# Patient Record
Sex: FEMALE | Race: OTHER | HISPANIC OR LATINO | Employment: FULL TIME | URBAN - METROPOLITAN AREA
[De-identification: names, ages, dates, MRNs, and addresses within clinical notes are randomized per-mention and may not be internally consistent; named-entity substitution may affect disease eponyms.]

---

## 2022-04-18 ENCOUNTER — TELEPHONE (OUTPATIENT)
Dept: OBSTETRICS AND GYNECOLOGY | Facility: CLINIC | Age: 67
End: 2022-04-18
Payer: MEDICAID

## 2022-04-18 NOTE — TELEPHONE ENCOUNTER
Called Pt to schedule appt pt states she will have daughter call to scheduled    Romi HUGHES LPN  OB/GYN

## 2022-04-18 NOTE — TELEPHONE ENCOUNTER
----- Message from Ny Torres sent at 4/18/2022 12:47 PM CDT -----  Contact: Pt friend  .Type:  Sooner Appointment Request    Caller is requesting a sooner appointment.  Caller declined first available appointment listed below.  Caller will not accept being placed on the waitlist and is requesting a message be sent to doctor.  Name of Caller: Amanda    When is the first available appointment?   Symptoms: pain in pelvis    Would the patient rather a call back or a response via [facility]?  Callback   Best Call Back Number: [phone] (home)     Additional Information:

## 2022-04-18 NOTE — TELEPHONE ENCOUNTER
----- Message from Annette Peters sent at 4/18/2022  2:04 PM CDT -----  Contact: Amanda for [phone]  Amanda called in for patient who needs  to schedule appt for Ob/gyn please call Amanda back [phone] to make appt for patient with abdominal pain.    Thanks bs

## 2022-05-26 ENCOUNTER — OFFICE VISIT (OUTPATIENT)
Dept: OBSTETRICS AND GYNECOLOGY | Facility: CLINIC | Age: 67
End: 2022-05-26
Payer: MEDICAID

## 2022-05-26 VITALS — WEIGHT: 152.56 LBS | SYSTOLIC BLOOD PRESSURE: 150 MMHG | DIASTOLIC BLOOD PRESSURE: 72 MMHG

## 2022-05-26 DIAGNOSIS — Z12.4 PAP SMEAR FOR CERVICAL CANCER SCREENING: ICD-10-CM

## 2022-05-26 DIAGNOSIS — N95.2 VAGINAL ATROPHY: Primary | ICD-10-CM

## 2022-05-26 PROCEDURE — 88175 CYTOPATH C/V AUTO FLUID REDO: CPT | Performed by: OBSTETRICS & GYNECOLOGY

## 2022-05-26 PROCEDURE — 3078F PR MOST RECENT DIASTOLIC BLOOD PRESSURE < 80 MM HG: ICD-10-PCS | Mod: CPTII,,, | Performed by: OBSTETRICS & GYNECOLOGY

## 2022-05-26 PROCEDURE — 99213 OFFICE O/P EST LOW 20 MIN: CPT | Mod: PBBFAC | Performed by: OBSTETRICS & GYNECOLOGY

## 2022-05-26 PROCEDURE — 87210 SMEAR WET MOUNT SALINE/INK: CPT | Mod: PBBFAC | Performed by: OBSTETRICS & GYNECOLOGY

## 2022-05-26 PROCEDURE — 1160F RVW MEDS BY RX/DR IN RCRD: CPT | Mod: CPTII,,, | Performed by: OBSTETRICS & GYNECOLOGY

## 2022-05-26 PROCEDURE — 1159F PR MEDICATION LIST DOCUMENTED IN MEDICAL RECORD: ICD-10-PCS | Mod: CPTII,,, | Performed by: OBSTETRICS & GYNECOLOGY

## 2022-05-26 PROCEDURE — 4010F ACE/ARB THERAPY RXD/TAKEN: CPT | Mod: CPTII,,, | Performed by: OBSTETRICS & GYNECOLOGY

## 2022-05-26 PROCEDURE — 99999 PR PBB SHADOW E&M-EST. PATIENT-LVL III: CPT | Mod: PBBFAC,,, | Performed by: OBSTETRICS & GYNECOLOGY

## 2022-05-26 PROCEDURE — 1160F PR REVIEW ALL MEDS BY PRESCRIBER/CLIN PHARMACIST DOCUMENTED: ICD-10-PCS | Mod: CPTII,,, | Performed by: OBSTETRICS & GYNECOLOGY

## 2022-05-26 PROCEDURE — 3077F PR MOST RECENT SYSTOLIC BLOOD PRESSURE >= 140 MM HG: ICD-10-PCS | Mod: CPTII,,, | Performed by: OBSTETRICS & GYNECOLOGY

## 2022-05-26 PROCEDURE — 3077F SYST BP >= 140 MM HG: CPT | Mod: CPTII,,, | Performed by: OBSTETRICS & GYNECOLOGY

## 2022-05-26 PROCEDURE — 3078F DIAST BP <80 MM HG: CPT | Mod: CPTII,,, | Performed by: OBSTETRICS & GYNECOLOGY

## 2022-05-26 PROCEDURE — 99204 PR OFFICE/OUTPT VISIT, NEW, LEVL IV, 45-59 MIN: ICD-10-PCS | Mod: S$PBB,,, | Performed by: OBSTETRICS & GYNECOLOGY

## 2022-05-26 PROCEDURE — 1159F MED LIST DOCD IN RCRD: CPT | Mod: CPTII,,, | Performed by: OBSTETRICS & GYNECOLOGY

## 2022-05-26 PROCEDURE — 99999 PR PBB SHADOW E&M-EST. PATIENT-LVL III: ICD-10-PCS | Mod: PBBFAC,,, | Performed by: OBSTETRICS & GYNECOLOGY

## 2022-05-26 PROCEDURE — 87624 HPV HI-RISK TYP POOLED RSLT: CPT | Performed by: OBSTETRICS & GYNECOLOGY

## 2022-05-26 PROCEDURE — 4010F PR ACE/ARB THEARPY RXD/TAKEN: ICD-10-PCS | Mod: CPTII,,, | Performed by: OBSTETRICS & GYNECOLOGY

## 2022-05-26 PROCEDURE — 99204 OFFICE O/P NEW MOD 45 MIN: CPT | Mod: S$PBB,,, | Performed by: OBSTETRICS & GYNECOLOGY

## 2022-05-26 RX ORDER — HYDROXYZINE PAMOATE 50 MG/1
50 CAPSULE ORAL NIGHTLY
COMMUNITY
Start: 2022-05-09 | End: 2023-09-25

## 2022-05-26 RX ORDER — CALCIUM CITRATE/VITAMIN D3 200MG-6.25
1 TABLET ORAL 3 TIMES DAILY
COMMUNITY
Start: 2022-05-09 | End: 2023-09-25 | Stop reason: SDUPTHER

## 2022-05-26 RX ORDER — PEN NEEDLE, DIABETIC 32GX 5/32"
NEEDLE, DISPOSABLE MISCELLANEOUS
COMMUNITY
Start: 2022-05-09 | End: 2023-09-25 | Stop reason: SDUPTHER

## 2022-05-26 RX ORDER — ATORVASTATIN CALCIUM 40 MG/1
TABLET, FILM COATED ORAL
COMMUNITY
End: 2023-09-25 | Stop reason: SDUPTHER

## 2022-05-26 RX ORDER — PEN NEEDLE, DIABETIC 30 GX3/16"
NEEDLE, DISPOSABLE MISCELLANEOUS
COMMUNITY
End: 2023-09-25 | Stop reason: SDUPTHER

## 2022-05-26 RX ORDER — OMEPRAZOLE 40 MG/1
CAPSULE, DELAYED RELEASE ORAL
COMMUNITY
End: 2023-09-25

## 2022-05-26 RX ORDER — INSULIN ASPART 100 [IU]/ML
INJECTION, SUSPENSION SUBCUTANEOUS
COMMUNITY
Start: 2021-06-14 | End: 2023-09-25 | Stop reason: SDUPTHER

## 2022-05-26 RX ORDER — METFORMIN HYDROCHLORIDE 1000 MG/1
1000 TABLET ORAL
COMMUNITY
End: 2023-09-25 | Stop reason: SDUPTHER

## 2022-05-26 RX ORDER — LOSARTAN POTASSIUM 100 MG/1
TABLET ORAL
COMMUNITY
End: 2023-09-25 | Stop reason: SDUPTHER

## 2022-05-26 RX ORDER — ERGOCALCIFEROL 1.25 MG/1
CAPSULE ORAL
COMMUNITY
Start: 2021-06-07

## 2022-05-26 RX ORDER — ESTRADIOL 0.1 MG/G
1 CREAM VAGINAL
Qty: 42.5 G | Refills: 6 | Status: SHIPPED | OUTPATIENT
Start: 2022-05-26 | End: 2023-09-25

## 2022-05-26 RX ORDER — LOSARTAN POTASSIUM 100 %
POWDER (GRAM) MISCELLANEOUS
COMMUNITY
End: 2023-09-25

## 2022-05-26 NOTE — PROGRESS NOTES
Subjective:       Patient ID: Rachelle Philip is a 66 y.o. female.    Chief Complaint:  Abdominal Pain and Vaginal Pain      History of Present Illness  HPI  Abdominal Pain  Patient presents for evaluation of abdominal pain. The pain is described as cramping, and is 4/10 in intensity. Pain is located in the suprapubic area without radiation. Onset was gradual occurring 1 months ago. Symptoms have been unchanged since. Aggravating factors: none. Alleviating factors: none. Associated symptoms: vaginal dryness and burning. The patient denies anorexia, constipation, diarrhea, dysuria, fever, hematuria, nausea and vomiting. Risk factors for pelvic/abdominal pain include none.  Last pap negative.  Pt is not sexually active.  Pt had one prior episode of this issue and was told that she had vaginal dryness.  Pt did not use cream.      GYN & OB History  No LMP recorded. Patient is postmenopausal.   Date of Last Pap: No result found    OB History    Para Term  AB Living   4 4 4     4   SAB IAB Ectopic Multiple Live Births           4      # Outcome Date GA Lbr Tushar/2nd Weight Sex Delivery Anes PTL Lv   4 Term            3 Term            2 Term            1 Term                Review of Systems  Review of Systems   Constitutional: Negative for activity change, appetite change, chills, fatigue, fever and unexpected weight change.   Respiratory: Negative for shortness of breath.    Cardiovascular: Negative for chest pain, palpitations and leg swelling.   Gastrointestinal: Positive for abdominal pain. Negative for bloating, blood in stool, constipation, diarrhea, nausea and vomiting.   Genitourinary: Positive for dysuria, vaginal pain and vaginal dryness. Negative for flank pain, frequency, genital sores, hematuria, hot flashes, pelvic pain, urgency, vaginal bleeding, vaginal discharge, urinary incontinence, postmenopausal bleeding and vaginal odor.   Musculoskeletal: Negative for back pain.   Integumentary:  Negative  for breast mass, nipple discharge, breast skin changes and breast tenderness.   Neurological: Negative for syncope and headaches.   Breast: Negative for asymmetry, lump, mass, mastodynia, nipple discharge, skin changes and tenderness          Objective:    Physical Exam:   Constitutional: She is oriented to person, place, and time. She appears well-developed and well-nourished. No distress.    HENT:   Head: Normocephalic and atraumatic.    Eyes: Pupils are equal, round, and reactive to light. EOM are normal.     Cardiovascular: Normal rate, regular rhythm and normal heart sounds.     Pulmonary/Chest: Effort normal and breath sounds normal.        Abdominal: Soft. Bowel sounds are normal. She exhibits no distension. There is no abdominal tenderness.     Genitourinary:    Vagina, uterus, right adnexa and left adnexa normal.      Pelvic exam was performed with patient supine.   There is no rash, tenderness, lesion or injury on the right labia. There is no rash, tenderness, lesion or injury on the left labia. Cervix is normal. Right adnexum displays no mass, no tenderness and no fullness. Left adnexum displays no mass, no tenderness and no fullness. No erythema,  no vaginal discharge, tenderness or bleeding in the vagina.    No foreign body in the vagina.      No signs of injury in the vagina.   Cervix exhibits no motion tenderness, no discharge and no friability. Uterus is not deviated, not enlarged and not tender.    Genitourinary Comments: UA today Negative  Wet prep: negative for trichomonas, yeast, or clue cells             Musculoskeletal: Normal range of motion and moves all extremeties. No tenderness or edema.       Neurological: She is alert and oriented to person, place, and time.    Skin: Skin is warm and dry.    Psychiatric: She has a normal mood and affect. Her behavior is normal. Thought content normal.          Assessment:        1. Vaginal atrophy    2. Pap smear for cervical cancer screening              Plan:      Vaginal atrophy  -     POCT Wet Prep  -     estradioL (ESTRACE) 0.01 % (0.1 mg/gram) vaginal cream; Place 1 g vaginally twice a week.  Dispense: 42.5 g; Refill: 6  -     POCT URINE DIPSTICK WITHOUT MICROSCOPE  -     No evidence of vaginitis on exam today.  Findings suggestive of vaginal atrophy and remainder of exam unremarkable.  Recommend Replens OTC and Estrace cream.    Pap smear for cervical cancer screening  -     Liquid-Based Pap Smear, Screening  -     HPV High Risk Genotypes, PCR  -     Pt was counseled on cervical/vaginal screening guidelines and recommendations.  If today's pap smear result is negative, next pap smear will be due in 3-5 yrs.  -     Follow up with PCP for routine health maintenance needs.      Follow up in about 3 months (around 8/26/2022).

## 2022-08-30 ENCOUNTER — TELEPHONE (OUTPATIENT)
Dept: OBSTETRICS AND GYNECOLOGY | Facility: CLINIC | Age: 67
End: 2022-08-30
Payer: MEDICAID

## 2022-08-30 NOTE — TELEPHONE ENCOUNTER
Attempted to contact patient w/ assistance of [employer] regarding scheduled appointment with  on Tomorrow 8/31/2022.   will not be in clinic until 10am and appointment will need to be rescheduled. No answer left callback message. Will send [employer] communication as well.

## 2023-04-04 ENCOUNTER — PATIENT MESSAGE (OUTPATIENT)
Dept: RESEARCH | Facility: HOSPITAL | Age: 68
End: 2023-04-04
Payer: MEDICARE

## 2023-09-01 ENCOUNTER — TELEPHONE (OUTPATIENT)
Dept: FAMILY MEDICINE | Facility: CLINIC | Age: 68
End: 2023-09-01
Payer: MEDICARE

## 2023-09-01 NOTE — TELEPHONE ENCOUNTER
----- Message from Parish Damico sent at 9/1/2023 11:01 AM CDT -----  Contact: laron Yancey is calling requesting a sooner appt than 09/25 in the afternoon preferably due to work. She is a NP. Please call back at [phone]                Thanks  CORY

## 2023-09-01 NOTE — TELEPHONE ENCOUNTER
Spoke to pt,. Informed her that Dr. Pires only sees new patients in the mornings. Scheduled pt on 9/25/23 at 9:40 am. Pt verbalized understanding and repeated date and time back to me.

## 2023-09-25 ENCOUNTER — OFFICE VISIT (OUTPATIENT)
Dept: FAMILY MEDICINE | Facility: CLINIC | Age: 68
End: 2023-09-25
Attending: FAMILY MEDICINE
Payer: MEDICARE

## 2023-09-25 ENCOUNTER — LAB VISIT (OUTPATIENT)
Dept: LAB | Facility: HOSPITAL | Age: 68
End: 2023-09-25
Attending: FAMILY MEDICINE
Payer: MEDICARE

## 2023-09-25 VITALS
WEIGHT: 152.69 LBS | SYSTOLIC BLOOD PRESSURE: 135 MMHG | BODY MASS INDEX: 29.98 KG/M2 | OXYGEN SATURATION: 98 % | HEART RATE: 73 BPM | HEIGHT: 60 IN | DIASTOLIC BLOOD PRESSURE: 80 MMHG | TEMPERATURE: 97 F

## 2023-09-25 DIAGNOSIS — E11.69 DM TYPE 2 WITH DIABETIC DYSLIPIDEMIA: ICD-10-CM

## 2023-09-25 DIAGNOSIS — E11.69 DM TYPE 2 WITH DIABETIC DYSLIPIDEMIA: Primary | ICD-10-CM

## 2023-09-25 DIAGNOSIS — E78.5 DM TYPE 2 WITH DIABETIC DYSLIPIDEMIA: Primary | ICD-10-CM

## 2023-09-25 DIAGNOSIS — E78.5 DM TYPE 2 WITH DIABETIC DYSLIPIDEMIA: ICD-10-CM

## 2023-09-25 DIAGNOSIS — Z78.0 ASYMPTOMATIC MENOPAUSAL STATE: ICD-10-CM

## 2023-09-25 DIAGNOSIS — I10 PRIMARY HYPERTENSION: ICD-10-CM

## 2023-09-25 DIAGNOSIS — Z12.39 ENCOUNTER FOR SCREENING FOR MALIGNANT NEOPLASM OF BREAST, UNSPECIFIED SCREENING MODALITY: ICD-10-CM

## 2023-09-25 DIAGNOSIS — Z12.31 ENCOUNTER FOR SCREENING MAMMOGRAM FOR MALIGNANT NEOPLASM OF BREAST: ICD-10-CM

## 2023-09-25 DIAGNOSIS — Z13.820 OSTEOPOROSIS SCREENING: ICD-10-CM

## 2023-09-25 PROBLEM — E11.9 TYPE 2 DIABETES MELLITUS, WITH LONG-TERM CURRENT USE OF INSULIN: Status: ACTIVE | Noted: 2021-05-11

## 2023-09-25 PROBLEM — Z79.4 TYPE 2 DIABETES MELLITUS, WITH LONG-TERM CURRENT USE OF INSULIN: Status: ACTIVE | Noted: 2021-05-11

## 2023-09-25 PROBLEM — E08.42 DIABETIC POLYNEUROPATHY ASSOCIATED WITH DIABETES MELLITUS DUE TO UNDERLYING CONDITION: Status: ACTIVE | Noted: 2017-03-24

## 2023-09-25 PROBLEM — K21.9 GASTROESOPHAGEAL REFLUX DISEASE WITHOUT ESOPHAGITIS: Status: ACTIVE | Noted: 2021-02-17

## 2023-09-25 LAB
ALBUMIN SERPL BCP-MCNC: 4.3 G/DL (ref 3.5–5.2)
ALBUMIN/CREAT UR: 15 UG/MG (ref 0–30)
ALP SERPL-CCNC: 78 U/L (ref 55–135)
ALT SERPL W/O P-5'-P-CCNC: 29 U/L (ref 10–44)
ANION GAP SERPL CALC-SCNC: 7 MMOL/L (ref 8–16)
AST SERPL-CCNC: 22 U/L (ref 10–40)
BASOPHILS # BLD AUTO: 0.1 K/UL (ref 0–0.2)
BASOPHILS NFR BLD: 1.4 % (ref 0–1.9)
BILIRUB SERPL-MCNC: 0.5 MG/DL (ref 0.1–1)
BUN SERPL-MCNC: 16 MG/DL (ref 8–23)
CALCIUM SERPL-MCNC: 9.6 MG/DL (ref 8.7–10.5)
CHLORIDE SERPL-SCNC: 104 MMOL/L (ref 95–110)
CHOLEST SERPL-MCNC: 214 MG/DL (ref 120–199)
CHOLEST/HDLC SERPL: 4 {RATIO} (ref 2–5)
CO2 SERPL-SCNC: 28 MMOL/L (ref 23–29)
CREAT SERPL-MCNC: 0.8 MG/DL (ref 0.5–1.4)
CREAT UR-MCNC: 40 MG/DL (ref 15–325)
DIFFERENTIAL METHOD: ABNORMAL
EOSINOPHIL # BLD AUTO: 0.2 K/UL (ref 0–0.5)
EOSINOPHIL NFR BLD: 2.4 % (ref 0–8)
ERYTHROCYTE [DISTWIDTH] IN BLOOD BY AUTOMATED COUNT: 12.3 % (ref 11.5–14.5)
EST. GFR  (NO RACE VARIABLE): >60 ML/MIN/1.73 M^2
ESTIMATED AVG GLUCOSE: 151 MG/DL (ref 68–131)
GLUCOSE SERPL-MCNC: 139 MG/DL (ref 70–110)
HBA1C MFR BLD: 6.9 % (ref 4–5.6)
HCT VFR BLD AUTO: 42.5 % (ref 37–48.5)
HDLC SERPL-MCNC: 53 MG/DL (ref 40–75)
HDLC SERPL: 24.8 % (ref 20–50)
HGB BLD-MCNC: 13.9 G/DL (ref 12–16)
IMM GRANULOCYTES # BLD AUTO: 0.03 K/UL (ref 0–0.04)
IMM GRANULOCYTES NFR BLD AUTO: 0.4 % (ref 0–0.5)
LDLC SERPL CALC-MCNC: 124.8 MG/DL (ref 63–159)
LYMPHOCYTES # BLD AUTO: 2.7 K/UL (ref 1–4.8)
LYMPHOCYTES NFR BLD: 37.2 % (ref 18–48)
MCH RBC QN AUTO: 29.4 PG (ref 27–31)
MCHC RBC AUTO-ENTMCNC: 32.7 G/DL (ref 32–36)
MCV RBC AUTO: 90 FL (ref 82–98)
MICROALBUMIN UR DL<=1MG/L-MCNC: 6 UG/ML
MONOCYTES # BLD AUTO: 0.6 K/UL (ref 0.3–1)
MONOCYTES NFR BLD: 8.2 % (ref 4–15)
NEUTROPHILS # BLD AUTO: 3.6 K/UL (ref 1.8–7.7)
NEUTROPHILS NFR BLD: 50.4 % (ref 38–73)
NONHDLC SERPL-MCNC: 161 MG/DL
NRBC BLD-RTO: 0 /100 WBC
PLATELET # BLD AUTO: 451 K/UL (ref 150–450)
PMV BLD AUTO: 10 FL (ref 9.2–12.9)
POTASSIUM SERPL-SCNC: 4.6 MMOL/L (ref 3.5–5.1)
PROT SERPL-MCNC: 7.8 G/DL (ref 6–8.4)
RBC # BLD AUTO: 4.73 M/UL (ref 4–5.4)
SODIUM SERPL-SCNC: 139 MMOL/L (ref 136–145)
TRIGL SERPL-MCNC: 181 MG/DL (ref 30–150)
WBC # BLD AUTO: 7.2 K/UL (ref 3.9–12.7)

## 2023-09-25 PROCEDURE — 80053 COMPREHEN METABOLIC PANEL: CPT | Performed by: FAMILY MEDICINE

## 2023-09-25 PROCEDURE — 83036 HEMOGLOBIN GLYCOSYLATED A1C: CPT | Performed by: FAMILY MEDICINE

## 2023-09-25 PROCEDURE — 99204 OFFICE O/P NEW MOD 45 MIN: CPT | Mod: S$PBB,,, | Performed by: FAMILY MEDICINE

## 2023-09-25 PROCEDURE — 80061 LIPID PANEL: CPT | Performed by: FAMILY MEDICINE

## 2023-09-25 PROCEDURE — 99999 PR PBB SHADOW E&M-EST. PATIENT-LVL IV: ICD-10-PCS | Mod: PBBFAC,,, | Performed by: FAMILY MEDICINE

## 2023-09-25 PROCEDURE — 99214 OFFICE O/P EST MOD 30 MIN: CPT | Mod: PBBFAC,PO | Performed by: FAMILY MEDICINE

## 2023-09-25 PROCEDURE — 85025 COMPLETE CBC W/AUTO DIFF WBC: CPT | Performed by: FAMILY MEDICINE

## 2023-09-25 PROCEDURE — 36415 COLL VENOUS BLD VENIPUNCTURE: CPT | Mod: PO | Performed by: FAMILY MEDICINE

## 2023-09-25 PROCEDURE — 99204 PR OFFICE/OUTPT VISIT, NEW, LEVL IV, 45-59 MIN: ICD-10-PCS | Mod: S$PBB,,, | Performed by: FAMILY MEDICINE

## 2023-09-25 PROCEDURE — 82570 ASSAY OF URINE CREATININE: CPT | Performed by: FAMILY MEDICINE

## 2023-09-25 PROCEDURE — 99999 PR PBB SHADOW E&M-EST. PATIENT-LVL IV: CPT | Mod: PBBFAC,,, | Performed by: FAMILY MEDICINE

## 2023-09-25 RX ORDER — LOSARTAN POTASSIUM 100 MG/1
TABLET ORAL
Qty: 90 TABLET | Refills: 1 | Status: SHIPPED | OUTPATIENT
Start: 2023-09-25 | End: 2023-12-19 | Stop reason: SDUPTHER

## 2023-09-25 RX ORDER — PEN NEEDLE, DIABETIC 30 GX3/16"
NEEDLE, DISPOSABLE MISCELLANEOUS
Qty: 180 EACH | Refills: 1 | Status: SHIPPED | OUTPATIENT
Start: 2023-09-25 | End: 2023-12-19 | Stop reason: SDUPTHER

## 2023-09-25 RX ORDER — INSULIN ASPART 100 [IU]/ML
INJECTION, SUSPENSION SUBCUTANEOUS
Qty: 6 EACH | Refills: 0 | Status: SHIPPED | OUTPATIENT
Start: 2023-09-25 | End: 2023-10-01 | Stop reason: SDUPTHER

## 2023-09-25 RX ORDER — ATORVASTATIN CALCIUM 40 MG/1
TABLET, FILM COATED ORAL
Qty: 90 TABLET | Refills: 1 | Status: SHIPPED | OUTPATIENT
Start: 2023-09-25 | End: 2023-12-19 | Stop reason: SDUPTHER

## 2023-09-25 RX ORDER — METFORMIN HYDROCHLORIDE 1000 MG/1
1000 TABLET ORAL
Qty: 90 TABLET | Refills: 0 | Status: SHIPPED | OUTPATIENT
Start: 2023-09-25

## 2023-09-25 RX ORDER — ASPIRIN 81 MG/1
81 TABLET ORAL DAILY
COMMUNITY
Start: 2023-08-16

## 2023-09-25 RX ORDER — CALCIUM CITRATE/VITAMIN D3 200MG-6.25
TABLET ORAL
Qty: 270 STRIP | Refills: 1 | Status: SHIPPED | OUTPATIENT
Start: 2023-09-25 | End: 2023-09-28 | Stop reason: SDUPTHER

## 2023-09-25 NOTE — PROGRESS NOTES
Subjective:       Patient ID: Rachelle Philip is a 67 y.o. female.    Chief Complaint: Establish Care    67 y old  female with t2 dm , htn , dlp , overweight here to get est . She has had t2dm for 15 y . Pre meal  glucose :140 , fastin : 110 . Opth : Seen 3 m ago at clinic on [place]. No depression . She exercises 3 times: walks and does Yoga .  She had [place] done at [facility] : 5 y ago . Due for MMG and DEXA .       Review of Systems   Constitutional: Negative.    HENT: Negative.     Eyes: Negative.    Respiratory: Negative.     Cardiovascular: Negative.    Gastrointestinal: Negative.    Genitourinary: Negative.    Musculoskeletal: Negative.    Skin: Negative.    Hematological: Negative.        Objective:      Physical Exam  Constitutional:       General: She is not in acute distress.     Appearance: She is well-developed. She is not diaphoretic.   HENT:      Head: Normocephalic and atraumatic.      Right Ear: External ear normal.      Left Ear: External ear normal.      Mouth/Throat:      Pharynx: No oropharyngeal exudate.   Eyes:      General: No scleral icterus.        Right eye: No discharge.         Left eye: No discharge.      Conjunctiva/sclera: Conjunctivae normal.      Pupils: Pupils are equal, round, and reactive to light.   Neck:      Thyroid: No thyromegaly.      Vascular: No JVD.      Trachea: No tracheal deviation.   Cardiovascular:      Rate and Rhythm: Normal rate and regular rhythm.      Pulses:           Dorsalis pedis pulses are 2+ on the right side and 2+ on the left side.        Posterior tibial pulses are 2+ on the right side and 2+ on the left side.      Heart sounds: Normal heart sounds. No murmur heard.     No friction rub. No gallop.   Pulmonary:      Effort: Pulmonary effort is normal. No respiratory distress.      Breath sounds: Normal breath sounds. No stridor. No wheezing or rales.   Chest:      Chest wall: No tenderness.   Abdominal:      General: Bowel sounds are normal. There is no  distension.      Palpations: Abdomen is soft.      Tenderness: There is no abdominal tenderness. There is no guarding or rebound.   Musculoskeletal:         General: Normal range of motion.      Cervical back: Normal range of motion and neck supple.      Right foot: Normal range of motion. No deformity, bunion, Charcot foot, foot drop or prominent metatarsal heads.      Left foot: Normal range of motion. No deformity, bunion, Charcot foot, foot drop or prominent metatarsal heads.   Feet:      Right foot:      Protective Sensation: 10 sites tested.  10 sites sensed.      Skin integrity: Skin integrity normal.      Toenail Condition: Right toenails are normal.      Left foot:      Protective Sensation: 10 sites tested.  10 sites sensed.      Skin integrity: Skin integrity normal.      Toenail Condition: Left toenails are normal.   Lymphadenopathy:      Cervical: No cervical adenopathy.   Skin:     General: Skin is warm and dry.   Neurological:      Mental Status: She is alert and oriented to person, place, and time.   Psychiatric:         Behavior: Behavior normal.         Thought Content: Thought content normal.         Judgment: Judgment normal.         Assessment:         Rachelle was seen today for establish care.    Diagnoses and all orders for this visit:    DM type 2 with diabetic dyslipidemia  -     CBC Auto Differential; Future  -     Comprehensive Metabolic Panel; Future  -     Hemoglobin A1C; Future  -     Lipid Panel; Future  -     Microalbumin/Creatinine Ratio, Urine    Encounter for screening for malignant neoplasm of breast, unspecified screening modality  -     Mammo Digital Screening Bilat; Future    Osteoporosis screening  -     DXA Bone Density Axial Skeleton 1 or more sites; Future    Encounter for screening mammogram for malignant neoplasm of breast  -     Mammo Digital Screening Bilat; Future    Asymptomatic menopausal state  -     DXA Bone Density Axial Skeleton 1 or more sites; Future    Primary  "hypertension    Other orders  -     metFORMIN (GLUCOPHAGE) 1000 MG tablet; Take 1 tablet (1,000 mg total) by mouth daily with breakfast.  -     losartan (COZAAR) 100 MG tablet; TAKE 1 TABLET BY MOUTH ONCE DAILY  -     insulin aspart protamine-insulin aspart (NOVOLOG MIX 70-30FLEXPEN U-100) 100 unit/mL (70-30) InPn pen; 45 u  sq BID. Dispense  6  boxes  -     atorvastatin (LIPITOR) 40 MG tablet; TAKE 1 TABLET BY MOUTH ONCE DAILY AT BEDTIME.  -     TRUE METRIX GLUCOSE TEST STRIP Strp; Check glucose TID  -     pen needle, diabetic (BD BRANT 2ND GEN PEN NEEDLE) 32 gauge x 5/32" Ndle; BD Brant 2nd Gen Pen Needle 32 gauge x 5/32"  USE TO INJECT INSULIN TWICE DAILY       Plan:     Rachelle was seen today for establish care.    Diagnoses and all orders for this visit:    DM type 2 with diabetic dyslipidemia  -     CBC Auto Differential; Future  -     Comprehensive Metabolic Panel; Future  -     Hemoglobin A1C; Future  -     Lipid Panel; Future  -     Microalbumin/Creatinine Ratio, Urine    Encounter for screening for malignant neoplasm of breast, unspecified screening modality  -     Mammo Digital Screening Bilat; Future    Osteoporosis screening  -     DXA Bone Density Axial Skeleton 1 or more sites; Future    Encounter for screening mammogram for malignant neoplasm of breast  -     Mammo Digital Screening Bilat; Future    Asymptomatic menopausal state  -     DXA Bone Density Axial Skeleton 1 or more sites; Future    Other orders  -     metFORMIN (GLUCOPHAGE) 1000 MG tablet; Take 1 tablet (1,000 mg total) by mouth daily with breakfast.  -     losartan (COZAAR) 100 MG tablet; TAKE 1 TABLET BY MOUTH ONCE DAILY  -     insulin aspart protamine-insulin aspart (NOVOLOG MIX 70-30FLEXPEN U-100) 100 unit/mL (70-30) InPn pen; 45 u  sq BID. Dispense  6  boxes  -     atorvastatin (LIPITOR) 40 MG tablet; TAKE 1 TABLET BY MOUTH ONCE DAILY AT BEDTIME.  -     TRUE METRIX GLUCOSE TEST STRIP Strp; Check glucose TID  -     pen needle, diabetic (BD " "BRANT 2ND GEN PEN NEEDLE) 32 gauge x 5/32" Ndle; BD Brant 2nd Gen Pen Needle 32 gauge x 5/32"  USE TO INJECT INSULIN TWICE DAILY     Labs . Diet and exercise    Controlled. Cont med   Will obtain colo  rec .   "

## 2023-09-26 ENCOUNTER — TELEPHONE (OUTPATIENT)
Dept: FAMILY MEDICINE | Facility: CLINIC | Age: 68
End: 2023-09-26
Payer: MEDICARE

## 2023-09-26 DIAGNOSIS — D75.839 THROMBOCYTOSIS: Primary | ICD-10-CM

## 2023-09-28 DIAGNOSIS — E11.9 TYPE 2 DIABETES MELLITUS WITHOUT COMPLICATION, WITH LONG-TERM CURRENT USE OF INSULIN: Primary | ICD-10-CM

## 2023-09-28 DIAGNOSIS — Z79.4 TYPE 2 DIABETES MELLITUS WITHOUT COMPLICATION, WITH LONG-TERM CURRENT USE OF INSULIN: Primary | ICD-10-CM

## 2023-09-28 RX ORDER — CALCIUM CITRATE/VITAMIN D3 200MG-6.25
TABLET ORAL
Qty: 270 STRIP | Refills: 1 | Status: SHIPPED | OUTPATIENT
Start: 2023-09-28 | End: 2023-12-19 | Stop reason: SDUPTHER

## 2023-09-30 ENCOUNTER — PATIENT MESSAGE (OUTPATIENT)
Dept: ADMINISTRATIVE | Facility: HOSPITAL | Age: 68
End: 2023-09-30
Payer: MEDICARE

## 2023-10-01 NOTE — TELEPHONE ENCOUNTER
No care due was identified.  [facility] Embedded Care Due Messages. Reference number: [id number].   10/01/2023 2:02:03 PM CDT

## 2023-10-02 RX ORDER — INSULIN ASPART 100 [IU]/ML
INJECTION, SUSPENSION SUBCUTANEOUS
Qty: 6 EACH | Refills: 0 | Status: SHIPPED | OUTPATIENT
Start: 2023-10-02 | End: 2024-02-29 | Stop reason: SDUPTHER

## 2023-10-11 ENCOUNTER — TELEPHONE (OUTPATIENT)
Dept: FAMILY MEDICINE | Facility: CLINIC | Age: 68
End: 2023-10-11
Payer: MEDICARE

## 2023-10-11 DIAGNOSIS — M51.36 DDD (DEGENERATIVE DISC DISEASE), LUMBAR: Primary | ICD-10-CM

## 2023-10-11 NOTE — TELEPHONE ENCOUNTER
----- Message from Yazmin Sawant sent at 10/11/2023 10:30 AM CDT -----  Patient is requesting a call back concerning her insulin. Call back at [phone]

## 2023-10-18 ENCOUNTER — HOSPITAL ENCOUNTER (OUTPATIENT)
Dept: RADIOLOGY | Facility: HOSPITAL | Age: 68
Discharge: HOME | End: 2023-10-18
Attending: FAMILY MEDICINE
Payer: MEDICARE

## 2023-10-18 VITALS — WEIGHT: 152.75 LBS | BODY MASS INDEX: 29.99 KG/M2 | HEIGHT: 60 IN

## 2023-10-18 DIAGNOSIS — Z12.31 ENCOUNTER FOR SCREENING MAMMOGRAM FOR MALIGNANT NEOPLASM OF BREAST: ICD-10-CM

## 2023-10-18 DIAGNOSIS — Z12.39 ENCOUNTER FOR SCREENING FOR MALIGNANT NEOPLASM OF BREAST, UNSPECIFIED SCREENING MODALITY: ICD-10-CM

## 2023-10-18 PROCEDURE — 77067 MAMMO DIGITAL SCREENING BILAT WITH TOMO: ICD-10-PCS | Mod: 26,,, | Performed by: RADIOLOGY

## 2023-10-18 PROCEDURE — 77067 SCR MAMMO BI INCL CAD: CPT | Mod: TC

## 2023-10-18 PROCEDURE — 77067 SCR MAMMO BI INCL CAD: CPT | Mod: 26,,, | Performed by: RADIOLOGY

## 2023-10-18 PROCEDURE — 77063 MAMMO DIGITAL SCREENING BILAT WITH TOMO: ICD-10-PCS | Mod: 26,,, | Performed by: RADIOLOGY

## 2023-10-18 PROCEDURE — 77063 BREAST TOMOSYNTHESIS BI: CPT | Mod: 26,,, | Performed by: RADIOLOGY

## 2023-10-20 ENCOUNTER — TELEPHONE (OUTPATIENT)
Dept: PAIN MEDICINE | Facility: CLINIC | Age: 68
End: 2023-10-20
Payer: MEDICARE

## 2023-10-20 NOTE — TELEPHONE ENCOUNTER
Reached out to patient from [facility]. Patient needed  services, language line was utilized. [email or web address] ID# [id number] was  for this call. Appointment was scheduled and patient v/u.  service information was charted in appropriate location in [facility].  RD

## 2023-11-30 ENCOUNTER — OFFICE VISIT (OUTPATIENT)
Dept: FAMILY MEDICINE | Facility: CLINIC | Age: 68
End: 2023-11-30
Payer: MEDICARE

## 2023-11-30 ENCOUNTER — HOSPITAL ENCOUNTER (OUTPATIENT)
Dept: RADIOLOGY | Facility: HOSPITAL | Age: 68
Discharge: HOME | End: 2023-11-30
Attending: NURSE PRACTITIONER
Payer: MEDICARE

## 2023-11-30 VITALS
BODY MASS INDEX: 30.47 KG/M2 | OXYGEN SATURATION: 95 % | HEIGHT: 60 IN | WEIGHT: 155.19 LBS | SYSTOLIC BLOOD PRESSURE: 130 MMHG | TEMPERATURE: 97 F | HEART RATE: 90 BPM | DIASTOLIC BLOOD PRESSURE: 80 MMHG

## 2023-11-30 DIAGNOSIS — M25.561 ACUTE PAIN OF RIGHT KNEE: ICD-10-CM

## 2023-11-30 DIAGNOSIS — G47.00 INSOMNIA, UNSPECIFIED TYPE: ICD-10-CM

## 2023-11-30 DIAGNOSIS — D75.839 THROMBOCYTOSIS: ICD-10-CM

## 2023-11-30 DIAGNOSIS — M54.16 LUMBAR RADICULOPATHY: Primary | ICD-10-CM

## 2023-11-30 PROCEDURE — 99204 OFFICE O/P NEW MOD 45 MIN: CPT | Mod: S$PBB,,, | Performed by: NURSE PRACTITIONER

## 2023-11-30 PROCEDURE — 73560 X-RAY EXAM OF KNEE 1 OR 2: CPT | Mod: 59,TC,PO,LT

## 2023-11-30 PROCEDURE — 99999 PR PBB SHADOW E&M-EST. PATIENT-LVL III: CPT | Mod: PBBFAC,,, | Performed by: NURSE PRACTITIONER

## 2023-11-30 PROCEDURE — 73560 XR KNEE ORTHO RIGHT: ICD-10-PCS | Mod: 26,59,LT, | Performed by: RADIOLOGY

## 2023-11-30 PROCEDURE — 73560 X-RAY EXAM OF KNEE 1 OR 2: CPT | Mod: 26,59,LT, | Performed by: RADIOLOGY

## 2023-11-30 PROCEDURE — 73562 X-RAY EXAM OF KNEE 3: CPT | Mod: 26,RT,, | Performed by: RADIOLOGY

## 2023-11-30 PROCEDURE — 73562 XR KNEE ORTHO RIGHT: ICD-10-PCS | Mod: 26,RT,, | Performed by: RADIOLOGY

## 2023-11-30 PROCEDURE — 99213 OFFICE O/P EST LOW 20 MIN: CPT | Mod: PBBFAC,PO | Performed by: NURSE PRACTITIONER

## 2023-11-30 RX ORDER — HYDROCODONE BITARTRATE AND ACETAMINOPHEN 5; 325 MG/1; MG/1
1 TABLET ORAL EVERY 6 HOURS PRN
Qty: 20 TABLET | Refills: 0 | Status: SHIPPED | OUTPATIENT
Start: 2023-11-30 | End: 2023-12-10

## 2023-11-30 RX ORDER — DICLOFENAC SODIUM 75 MG/1
75 TABLET, DELAYED RELEASE ORAL 2 TIMES DAILY
Qty: 20 TABLET | Refills: 0 | Status: SHIPPED | OUTPATIENT
Start: 2023-11-30 | End: 2023-12-10

## 2023-12-13 ENCOUNTER — TELEPHONE (OUTPATIENT)
Dept: PAIN MEDICINE | Facility: CLINIC | Age: 68
End: 2023-12-13
Payer: MEDICARE

## 2023-12-13 NOTE — TELEPHONE ENCOUNTER
----- Message from Lorraine Burns sent at 12/13/2023  1:00 PM CST -----  Pt would like a call back regarding the appt on 02/21/2024. Call back number is [phone]. Thx. EL

## 2023-12-19 DIAGNOSIS — E11.9 TYPE 2 DIABETES MELLITUS WITHOUT COMPLICATION, WITH LONG-TERM CURRENT USE OF INSULIN: ICD-10-CM

## 2023-12-19 DIAGNOSIS — Z79.4 TYPE 2 DIABETES MELLITUS WITHOUT COMPLICATION, WITH LONG-TERM CURRENT USE OF INSULIN: ICD-10-CM

## 2023-12-19 NOTE — TELEPHONE ENCOUNTER
No care due was identified.  [facility] Embedded Care Due Messages. Reference number: [id number].   12/19/2023 11:29:38 AM CST

## 2023-12-20 RX ORDER — ATORVASTATIN CALCIUM 40 MG/1
TABLET, FILM COATED ORAL
Qty: 90 TABLET | Refills: 1 | Status: SHIPPED | OUTPATIENT
Start: 2023-12-20 | End: 2024-03-07

## 2023-12-20 RX ORDER — PEN NEEDLE, DIABETIC 30 GX3/16"
NEEDLE, DISPOSABLE MISCELLANEOUS
Qty: 180 EACH | Refills: 1 | Status: SHIPPED | OUTPATIENT
Start: 2023-12-20 | End: 2024-02-07 | Stop reason: SDUPTHER

## 2023-12-20 RX ORDER — LOSARTAN POTASSIUM 100 MG/1
TABLET ORAL
Qty: 90 TABLET | Refills: 1 | Status: SHIPPED | OUTPATIENT
Start: 2023-12-20 | End: 2024-03-15 | Stop reason: SDUPTHER

## 2023-12-20 RX ORDER — CALCIUM CITRATE/VITAMIN D3 200MG-6.25
TABLET ORAL
Qty: 270 STRIP | Refills: 1 | Status: SHIPPED | OUTPATIENT
Start: 2023-12-20 | End: 2024-02-29 | Stop reason: SDUPTHER

## 2024-01-09 ENCOUNTER — TELEPHONE (OUTPATIENT)
Dept: FAMILY MEDICINE | Facility: CLINIC | Age: 69
End: 2024-01-09
Payer: MEDICARE

## 2024-01-09 RX ORDER — HYDROXYZINE PAMOATE 50 MG/1
50 CAPSULE ORAL NIGHTLY
COMMUNITY
Start: 2023-12-21

## 2024-01-09 NOTE — TELEPHONE ENCOUNTER
----- Message from Filomena Carter sent at 1/9/2024 10:45 AM CST -----  Contact: Rachelle Bush would like a call back at [phone] in regards to needing a refill on her pain medication, patient doesn't remember the name of her medications , she believes one was the Hydrocodone is one of them but I don't see it on her med list. Patient is also needing to see if she can get something due to having issues with sleeping.  [facility] [address] [place], [place] [id number] [address] [address]  Phone: [phone] Fax: [phone]    Thanks   Am

## 2024-01-09 NOTE — TELEPHONE ENCOUNTER
Patient has appt with PM on 2/21/24. Pt would like another rx for Hydrocodone and something to help sleep. Please advise?     There is a prescription in the medication reconciliation tab for Hydroxyzine 50 mg capsule to take every night for sleep prescribed by Coco Lazo on 12/21/23.

## 2024-01-11 ENCOUNTER — OFFICE VISIT (OUTPATIENT)
Dept: FAMILY MEDICINE | Facility: CLINIC | Age: 69
End: 2024-01-11
Payer: MEDICARE

## 2024-01-11 VITALS
SYSTOLIC BLOOD PRESSURE: 132 MMHG | HEIGHT: 60 IN | TEMPERATURE: 97 F | OXYGEN SATURATION: 97 % | BODY MASS INDEX: 30.77 KG/M2 | WEIGHT: 156.75 LBS | DIASTOLIC BLOOD PRESSURE: 70 MMHG | HEART RATE: 76 BPM

## 2024-01-11 DIAGNOSIS — M25.561 CHRONIC PAIN OF BOTH KNEES: Primary | ICD-10-CM

## 2024-01-11 DIAGNOSIS — E11.59 HYPERTENSION COMPLICATING DIABETES: ICD-10-CM

## 2024-01-11 DIAGNOSIS — G89.29 CHRONIC PAIN OF BOTH KNEES: Primary | ICD-10-CM

## 2024-01-11 DIAGNOSIS — I15.2 HYPERTENSION COMPLICATING DIABETES: ICD-10-CM

## 2024-01-11 DIAGNOSIS — E11.69 DM TYPE 2 WITH DIABETIC DYSLIPIDEMIA: ICD-10-CM

## 2024-01-11 DIAGNOSIS — E78.5 DM TYPE 2 WITH DIABETIC DYSLIPIDEMIA: ICD-10-CM

## 2024-01-11 DIAGNOSIS — M25.562 CHRONIC PAIN OF BOTH KNEES: Primary | ICD-10-CM

## 2024-01-11 PROCEDURE — 99214 OFFICE O/P EST MOD 30 MIN: CPT | Mod: S$PBB,,, | Performed by: FAMILY MEDICINE

## 2024-01-11 PROCEDURE — 99213 OFFICE O/P EST LOW 20 MIN: CPT | Mod: PBBFAC,PO | Performed by: FAMILY MEDICINE

## 2024-01-11 PROCEDURE — 99999 PR PBB SHADOW E&M-EST. PATIENT-LVL III: CPT | Mod: PBBFAC,,, | Performed by: FAMILY MEDICINE

## 2024-01-11 RX ORDER — MELOXICAM 15 MG/1
15 TABLET ORAL DAILY
Qty: 30 TABLET | Refills: 1 | Status: SHIPPED | OUTPATIENT
Start: 2024-01-11 | End: 2024-02-07 | Stop reason: SDUPTHER

## 2024-01-11 NOTE — PROGRESS NOTES
"Subjective:       Patient ID: Rachelle Philip is a 68 y.o. female.    Chief Complaint: Knee Injury      HPI Comments:       Current Outpatient Medications:     aspirin (ECOTRIN) 81 MG EC tablet, Take 81 mg by mouth once daily., Disp: , Rfl:     atorvastatin (LIPITOR) 40 MG tablet, TAKE 1 TABLET BY MOUTH ONCE DAILY AT BEDTIME., Disp: 90 tablet, Rfl: 1    ergocalciferol (ERGOCALCIFEROL) 50,000 unit Cap, ergocalciferol (vitamin D2) 1,250 mcg (50,000 unit) capsule  TAKE 1 CAPSULE BY MOUTH ONCE A WEEK, Disp: , Rfl:     hydrOXYzine pamoate (VISTARIL) 50 MG Cap, Take 50 mg by mouth every evening., Disp: , Rfl:     insulin aspart protamine-insulin aspart (NOVOLOG MIX 70-30FLEXPEN U-100) 100 unit/mL (70-30) InPn pen, 45 u  sq BID. Dispense  6  boxes, Disp: 6 each, Rfl: 0    losartan (COZAAR) 100 MG tablet, TAKE 1 TABLET BY MOUTH ONCE DAILY, Disp: 90 tablet, Rfl: 1    metFORMIN (GLUCOPHAGE) 1000 MG tablet, Take 1 tablet (1,000 mg total) by mouth daily with breakfast., Disp: 90 tablet, Rfl: 0    pen needle, diabetic (BD BRANT 2ND GEN PEN NEEDLE) 32 gauge x 5/32" Ndle, BD Brant 2nd Gen Pen Needle 32 gauge x 5/32"  USE TO INJECT INSULIN TWICE DAILY, Disp: 180 each, Rfl: 1    TRUE METRIX GLUCOSE TEST STRIP Strp, Check glucose TID, Disp: 270 strip, Rfl: 1    meloxicam (MOBIC) 15 MG tablet, Take 1 tablet (15 mg total) by mouth once daily., Disp: 30 tablet, Rfl: 1      This my 1st time seeing this patient.  The visit was conducted with the assistance of an online Slovak/English      She complains of pain in her knees for the last 6 weeks or so.  Right greater than left.  No injury or strain.  No previous problems with her knees.  X-rays at the time suggested minimal arthritis.  She was put on diclofenac and Norco.  She says the medicines helped only a little.  She has minimal swelling a been only on the right side.  Hurts when she has been walking long distances and better when she is sitting or lying down.  However, " "she says it hurts "all the time".      Review of Systems   Constitutional:  Negative for activity change, appetite change and fever.   HENT:  Negative for sore throat.    Respiratory:  Negative for cough and shortness of breath.    Cardiovascular:  Negative for chest pain.   Gastrointestinal:  Negative for abdominal pain, diarrhea and nausea.   Genitourinary:  Negative for difficulty urinating.   Musculoskeletal:  Positive for arthralgias and gait problem. Negative for myalgias.   Neurological:  Negative for dizziness and headaches.       Objective:      Vitals:    01/11/24 1354   BP: 132/70   Pulse: 76   Temp: 96.7 °F (35.9 °C)   TempSrc: Tympanic   SpO2: 97%   Weight: 71.1 kg (156 lb 12 oz)   Height: 5' (1.524 m)   PainSc:   7   PainLoc: Knee     Physical Exam  Vitals and nursing note reviewed.   Constitutional:       General: She is not in acute distress.     Appearance: She is well-developed. She is not diaphoretic.   HENT:      Head: Normocephalic.   Neck:      Thyroid: No thyromegaly.   Cardiovascular:      Rate and Rhythm: Normal rate and regular rhythm.      Heart sounds: Normal heart sounds. No murmur heard.  Pulmonary:      Effort: Pulmonary effort is normal.      Breath sounds: Normal breath sounds. No wheezing or rales.   Abdominal:      General: There is no distension.      Palpations: Abdomen is soft.   Musculoskeletal:      Cervical back: Neck supple.      Right knee: No swelling, deformity, effusion, erythema or crepitus. Normal range of motion. Tenderness present over the medial joint line. No lateral joint line tenderness.      Left knee: No swelling, deformity, effusion, erythema or crepitus. Normal range of motion. No tenderness. No medial joint line or lateral joint line tenderness.        Legs:       Comments: Area of tenderness and complaint on right knee   Lymphadenopathy:      Cervical: No cervical adenopathy.   Skin:     General: Skin is warm and dry.   Neurological:      Mental Status: She " is alert and oriented to person, place, and time.   Psychiatric:         Mood and Affect: Mood normal.         Behavior: Behavior normal.         Thought Content: Thought content normal.         Judgment: Judgment normal.         Assessment:       1. Chronic pain of both knees    2. DM type 2 with diabetic dyslipidemia    3. Hypertension complicating diabetes        Plan:   Chronic pain of both knees  Comments:  No improvement after treadmill, NSAIDs, Norco.  Physical therapy.  Follow-up PCP 6 weeks  Orders:  -     Ambulatory referral/consult to Physical/Occupational Therapy; Future; Expected date: 01/18/2024  -     meloxicam (MOBIC) 15 MG tablet; Take 1 tablet (15 mg total) by mouth once daily.  Dispense: 30 tablet; Refill: 1    DM type 2 with diabetic dyslipidemia  Comments:  A1c 6.8    Hypertension complicating diabetes  Comments:  Controlled

## 2024-02-05 ENCOUNTER — CLINICAL SUPPORT (OUTPATIENT)
Dept: REHABILITATION | Facility: HOSPITAL | Age: 69
End: 2024-02-05
Attending: FAMILY MEDICINE
Payer: MEDICARE

## 2024-02-05 DIAGNOSIS — M25.60 DECREASED RANGE OF MOTION WITH DECREASED STRENGTH: ICD-10-CM

## 2024-02-05 DIAGNOSIS — M25.561 CHRONIC PAIN OF BOTH KNEES: ICD-10-CM

## 2024-02-05 DIAGNOSIS — G89.29 CHRONIC PAIN OF BOTH KNEES: ICD-10-CM

## 2024-02-05 DIAGNOSIS — M25.562 CHRONIC PAIN OF BOTH KNEES: ICD-10-CM

## 2024-02-05 DIAGNOSIS — R53.1 DECREASED RANGE OF MOTION WITH DECREASED STRENGTH: ICD-10-CM

## 2024-02-05 DIAGNOSIS — R68.89 DECREASED FUNCTIONAL ACTIVITY TOLERANCE: ICD-10-CM

## 2024-02-05 DIAGNOSIS — R52 PAIN: ICD-10-CM

## 2024-02-05 DIAGNOSIS — R26.9 GAIT ABNORMALITY: Primary | ICD-10-CM

## 2024-02-05 PROCEDURE — 97110 THERAPEUTIC EXERCISES: CPT

## 2024-02-05 PROCEDURE — 97162 PT EVAL MOD COMPLEX 30 MIN: CPT

## 2024-02-05 NOTE — PLAN OF CARE
[facility]   Physical Therapy Initial Evaluation      Date: 2/5/2024   Name: Rachelle Philip  [facility] Number: [id number]    Therapy Diagnosis:    Encounter Diagnoses   Name Primary?    Chronic pain of both knees     Gait abnormality Yes    Pain     Decreased range of motion with decreased strength     Decreased functional activity tolerance       Physician: Gavin Ocampo MD     Physician Orders: PT Eval and Treat  Medical Diagnosis from Referral: Chronic pain of both knees  Evaluation Date: 2/5/2024  Authorization Period Expiration: 1/10/2025  Plan of Care Expiration: 4/5/2024  Progress Note Due: 3/5/2024  Visit # / Visits authorized: 1/1  FOTO: 1/3 (last performed on 2/5/2024)    Precautions: Standard, Diabetes, and HTN    Time In: 2:00 pm  Time Out: 3:00 pm  Total Billable Time (timed & untimed codes): 60 minutes    Evaluation completed with use of  services for all communication with patient.    SUBJECTIVE   Date of onset: ~4 weeks ago    History of current condition - Rachelle reports she began to have some pain in December in her knees. It has gradually gotten worse. She does walk on Treadmill at 2.5 incline, and has for a year.  She reports that someone told her that she needed to walk for her knee pain.  Indicates pain over the lateral anterior area of bilateral knees.  She notes that she has constant pain, and has pain first thing in morning, worse the more she does and increases at night if she has had better day.  4 months ago was able to quickly walk and do some jogging on Treadmill. Gradually increased pain until had to stop jogging and then pain got really bad in December - and had to stop walking all of December. Has been able to walk but more slowly. Also does bicycle for 15 min and has no pain with bicycle.    Current Activity Level: walks on treadmill x 40 min 3x per week, used to jog but now has a very slow walking pace.    Falls: [x] No  [] Yes    Imaging: [x] Xray  [] MRI [] CT:   FINDINGS:  No acute osseous abnormality.  Mild right knee medial compartment joint space narrowing. 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  Prior Level of Function: walking standing prior to December ~ was able to walk and stand as long as wanted to.  Current Level of Function:  walking ~ as long as wants to but has increased pain karin lockyasmine pas patch on and helps and feels like knee needs extra support on right, standing ~ 10 min     Pain:  Current 4-5/10, worst 7-8/10, best 3/10   Location: bilateral knees  Description: Aching, Dull, Throbbing, Tight, and pressure  Aggravating Factors: walking/standing  Easing Factors:  over the counter medication    Patients goals: Decrease the pain, prevent further arthritis.    Medical History:   Past Medical History:   Diagnosis Date    Diabetes mellitus     Hypertension        Surgical History:   Rachelle Philip  has a past surgical history that includes Tubal ligation.    Medications:   Rachelle has a current medication list which includes the following prescription(s): aspirin, atorvastatin, ergocalciferol, hydroxyzine pamoate, insulin aspart protamine-insulin aspart, losartan, meloxicam, metformin, pen needle, diabetic, and true metrix glucose test strip.    Allergies:   Review of patient's allergies indicates:  No Known Allergies     OBJECTIVE     Posture:  Patient presents with postural abnormalities which include:    [x] Forward Head   [x] Increased Lumbar Lordosis   [x] Rounded Shoulder   [] Flat Back Posture   [] Increased Thoracic Kyphosis [] Pes Planus   [] Increased Trunk Sway  [] Valgus knee position   [] Increased Trunk Rotation  [] Varus knee position   [] Increased cervical lordosis [] Other:    Sensation:    Sensation to light touch over UE's is  [] Intact [] Impaired [x] Defer  Sensation to light touch over LE's is  [x]  "Intact [] Impaired [] Defer    Reflexes:  Patellar   [x] Defer [] Normal [] Hyper []  Hypo   Achilles  [x] Defer [] Normal [] Hyper []  Hypo  Tricep  [x] Defer [] Normal [] Hyper []  Hypo  Brachioradialis[x] Defer [] Normal [] Hyper []  Hypo      Gait Analysis: The patient ambulated with the following assistive device: none; the patient presents with the following gait abnormalities: antalgic gait and + hip drop, better in heels versus  without.      Range of Motion:    Knee AROM/PROM Right Left Pain/Dysfunction with Movement   Knee Flexion (135º) 120 128 Pain on right "feels tight"   Knee Extension (0º) 0 0        Ankle/Foot AROM/PROM Right Left Pain/Dysfunction with Movement   Dorsiflexion (20º) 0 6    Great Toe Extension (70º) 50 60         Strength:    L/E MMT Right   Left Pain/Dysfunction with Movement   Hip Flexion  3+/5 3+/5 +trunk shift   Hip Abduction  2+/5 2+/5 + trunk shift   Knee Extension 4+/5 4+/5 Mild pain right knee; +trunk shift   Knee Flexion 4+/5 4+/5 Mild pain right knee; +trunk shift   Hip IR 3+/5 3+/5 +trunk shift   Hip ER 3+/5 3+/5 +trunk shift   Ankle DF 4+/5 4+/5    Ankle PF 4+/5 4+/5        Muscle Length:       Muscle Tested  Right Left  Limitation   Quadriceps [] Normal      [x] Limited [x] Normal      [] Limited    Gastrocnemius  [] Normal      [x] Limited [] Normal      [x] Limited    Soleus  [] Normal      [x] Limited [] Normal      [x] Limited        Joint Mobility:   JOINT MOBILITY CHARTS:   Joint Motion Right Mobility  (spine) Left Mobility Goal   Proximal Tibia AP [] Hypo     [x] Normal     [] Hyper [] Hypo     [x] Normal     [] Hyper Normal    Patellar Medial Glide  [x] Hypo     [] Normal     [] Hyper [x] Hypo     [] Normal     [] Hyper Normal    Patellar Lateral Glide  [x] Hypo     [] Normal     [] Hyper [x] Hypo     [] Normal     [] Hyper Normal    Patellar Superior Glide  [x] Hypo     [] Normal     [] Hyper [x] Hypo     [] Normal     [] Hyper Normal    Patellar Inferior Glide  " [x] Hypo     [] Normal     [] Hyper [x] Hypo     [] Normal     [] Hyper Normal    Ankle:  [x] Hypo     [] Normal     [] Hyper [x] Hypo     [] Normal     [] Hyper Normal        Special Tests:  defer    Palpation:  Tender over patellar tendon to palpation on right knee.      FOTO:    Intake Outcome Measure for FOTO Not tested Survey    Therapist reviewed FOTO scores for Rachelle Philip on 2/5/2024.   FOTO documents entered into [facility] - see Media section or FOTO account episode details..    Intake Score: unable to obtain FOTO today due to time constraints, will capture at next visit         TREATMENT     Total Treatment time (time-based codes) separate from Evaluation: (8) minutes     Rachelle received the treatments listed below:   Rachelle received therapeutic exercises to develop strength, endurance, ROM, and core stabilization for 8 minutes including:    Intervention 2/5/2024  Parameters   HEP x                                        Plan for Next Visit: Nu step, gastraoc stretch in standing, ankle DF stretch at step, long arc quad, sidelying hip series, sidelying hip adduction, bridge, prone hip extension, prone hip extension with knee flexion, sit to stand, side stepping. Manual decompression/cupping of patella with PROM knee flexion/extension on right knee, ankle glides, great toe extension stretch, toe yoga        PATIENT EDUCATION AND HOME EXERCISES     Education provided: (during treatment session) minutes  Home exercise program, and importance of ankle/toe ROM for walking/jogging on incline Treadmill.    Written Home Exercises Provided: yes.  Exercises were reviewed and Rachelle was able to demonstrate them prior to the end of the session.  Rachelle demonstrated good  understanding of the education provided. See EMR under Patient Instructions for exercises provided during therapy sessions.    ASSESSMENT     Rachelle is a 68 y.o. female referred to outpatient Physical Therapy with a medical diagnosis of Chronic pain of both knees .  The patient presents with signs and symptoms consistent with diagnosis along with impairments which include weakness, impaired functional mobility, gait instability, decreased lower extremity function, pain, abnormal tone, decreased ROM, impaired joint extensibility, and impaired muscle length.      Patient prognosis is Good, if patient is consistent and compliant with Physical Therapy and home exercise program.  Patient will benefit from skilled outpatient Physical Therapy to address the deficits stated above and in the chart below, provide patient/family education, and to maximize patient's level of independence.     Plan of care discussed with patient: Yes  Patient's spiritual, cultural and educational needs considered and patient is agreeable to the plan of care and goals as stated below:     Anticipated Barriers for therapy: co-morbidities, sedentary lifestyle, chronicity of condition, lack of understanding of condition, lack of support system, coping style, and social background      Medical Necessity is demonstrated by the following   History  Co-morbidities and personal factors that may impact the plan of care [] LOW: no personal factors / co-morbidities  [x] MODERATE: 1-2 personal factors / co-morbidities  [] HIGH: 3+ personal factors / co-morbidities    Moderate / High Support Documentation:   Past Medical History:   Diagnosis Date    Diabetes mellitus     Hypertension          Examination  Body Structures and Functions, activity limitations and participation restrictions that may impact the plan of care [] LOW: addressing 1-2 elements  [x] MODERATE: 3+ elements  [] HIGH: 4+ elements (please support below)    Moderate / High Support Documentation: See evaluation / objective measurements above and FOTO.     Clinical Presentation [] LOW: stable  [x] MODERATE: Evolving  [] HIGH: Unstable     Decision Making/ Complexity Score: moderate         Goals:  Reviewed: 2/5/2024      Short Term Goals: In 4 weeks   Progress Date   1.Patient to be educated on HEP. [x] Progressing  [] MET   [] Not MET   [] Not tested    2.Patient to increase right knee range of motion, in order to improve available range of motion for ADL's.  [x] Progressing  [] MET   [] Not MET  [] Not tested    3.Patient to increase bilateral LE strength by 1/2 grade, in order to improve endurance and increase ability to perform all functional activities for increased time.  [] Progressing  [] MET   [] Not MET  [] Not tested    4.Patient to have pain less than 5/10 at worst, to improve QOL. [x] Progressing  [] MET   [] Not MET  [] Not tested    5.Patient to improve score on the FOTO, to improve QOL. [x] Progressing  [] MET   [] Not MET  [] Not tested    6. Patient to score on 5 times sit to stand with long term goals made in order to decrease fall risk. [x] Progressing  [] MET   [] Not MET  [] Not tested      Long Term Goals: In 8 weeks Progress Date   1.Patient to improve score on the FOTO predicted score or better, to improve QOL. [x] Progressing  [] MET   [] Not MET  [] Not tested    2. Patient to increase bilateral LE strength to 4+/5 or greater, in order to improve endurance and increase ability to perform all functional activities for increased time. [x] Progressing  [] MET   [] Not MET  [] Not tested    3. Patient to have decreased pain to 2/10 at worst, to improve QOL. [x] Progressing  [] MET   [] Not MET  [] Not tested    4. Patient to have long term goals for score on 5 times sit to stand , in order to improve endurance and decrease fall risk. [x] Progressing  [] MET   [] Not MET  [] Not tested    5. Patient to perform daily activities including walking on treadmill without increased symptoms. [x] Progressing  [] MET   [] Not MET  [] Not tested      PLAN     Plan of care Certification: 2/5/2024  to 4/5/2024.    Outpatient Physical Therapy 2 times weekly for 8 weeks to include any combination of the following interventions: electrical stimulation  (PRN), Manual Therapy, Neuromuscular Re-ed, Patient Education/Self Care, Therapeutic Activites, Therapeutic Exercise, and Moist Hot Pack/Cold Pack    Geni Edwards, PT

## 2024-02-06 DIAGNOSIS — Z12.11 COLON CANCER SCREENING: ICD-10-CM

## 2024-02-07 DIAGNOSIS — M25.561 CHRONIC PAIN OF BOTH KNEES: ICD-10-CM

## 2024-02-07 DIAGNOSIS — M25.562 CHRONIC PAIN OF BOTH KNEES: ICD-10-CM

## 2024-02-07 DIAGNOSIS — G89.29 CHRONIC PAIN OF BOTH KNEES: ICD-10-CM

## 2024-02-07 RX ORDER — MELOXICAM 15 MG/1
15 TABLET ORAL DAILY
Qty: 30 TABLET | Refills: 1 | Status: SHIPPED | OUTPATIENT
Start: 2024-02-07 | End: 2024-02-21

## 2024-02-07 RX ORDER — PEN NEEDLE, DIABETIC 30 GX3/16"
NEEDLE, DISPOSABLE MISCELLANEOUS
Qty: 180 EACH | Refills: 1 | Status: SHIPPED | OUTPATIENT
Start: 2024-02-07 | End: 2024-02-29 | Stop reason: SDUPTHER

## 2024-02-07 NOTE — TELEPHONE ENCOUNTER
No care due was identified.  [facility] Embedded Care Due Messages. Reference number: [id number].   2/07/2024 12:27:21 PM CST

## 2024-02-07 NOTE — TELEPHONE ENCOUNTER
Care Due:                  Date            Visit Type   Department     Provider  --------------------------------------------------------------------------------                                MYCHART                              FOLLOWUP/OF  [facility] INTERNAL  Last Visit: 01-      FICE VISIT   MEDICINE       Gavin Ocampo                              EP -                              PRIMARY      [facility] INTERNAL  Faina GARCIA  Next Visit: 02-      CARE ([facility])   MEDICINE       Ayush                                                            Last  Test          Frequency    Reason                     Performed    Due Date  --------------------------------------------------------------------------------    HBA1C.......  6 months...  insulin, metFORMIN.......  09- 03-    [facility] Embedded Care Due Messages. Reference number: [id number].   2/07/2024 12:26:48 PM CST  
Please see the attached refill request.  
no

## 2024-02-09 ENCOUNTER — CLINICAL SUPPORT (OUTPATIENT)
Dept: REHABILITATION | Facility: HOSPITAL | Age: 69
End: 2024-02-09
Payer: MEDICARE

## 2024-02-09 DIAGNOSIS — R68.89 DECREASED FUNCTIONAL ACTIVITY TOLERANCE: ICD-10-CM

## 2024-02-09 DIAGNOSIS — R53.1 DECREASED RANGE OF MOTION WITH DECREASED STRENGTH: ICD-10-CM

## 2024-02-09 DIAGNOSIS — R52 PAIN: ICD-10-CM

## 2024-02-09 DIAGNOSIS — M25.60 DECREASED RANGE OF MOTION WITH DECREASED STRENGTH: ICD-10-CM

## 2024-02-09 DIAGNOSIS — R26.9 GAIT ABNORMALITY: Primary | ICD-10-CM

## 2024-02-09 PROCEDURE — 97110 THERAPEUTIC EXERCISES: CPT | Mod: CQ

## 2024-02-09 NOTE — PROGRESS NOTES
[facility]   Physical Therapy Treatment Note        Name: Rachelle Philip  [facility] Number: [id number]    Therapy Diagnosis:   Encounter Diagnoses   Name Primary?    Gait abnormality Yes    Pain     Decreased range of motion with decreased strength     Decreased functional activity tolerance      Physician: Gavin Ocampo MD    Visit Date: 2/9/2024      Physician Orders: PT Eval and Treat  Medical Diagnosis from Referral: Chronic pain of both knees  Evaluation Date: 2/5/2024  Authorization Period Expiration: 1/10/2025  Plan of Care Expiration: 4/5/2024  Progress Note Due: 3/5/2024  Visit # / Visits authorized: 1/1  FOTO: 1/3 (last performed on 2/5/2024)     Precautions: Standard, Diabetes, and HTN  PTA Visit #: 1/5     Time In: 1500  Time Out: 1555  Total Billable Time: 55 minutes Billing reflects [place] medicaid guidelines, billing all therapy as therapeutic-exercise     Subjective     Patient reports: more right knee pain than left knee. She states that her pain is intermittent and that she takes Tylenol for pain management.     She was compliant with home exercise program.    Response to previous treatment: felt okay after evaluation    Functional change: decreased walking tolerance and pain with sudden changes of direction while walking. Standing tolerance limited to about 30 minutes, increased pain with repositioning in bed    Pain: 6/10     Location: right knee medical joint line, 4/10 left knee medical joint line    Treatment was conducted with the aid of  (Jackie # [id number])    Objective      Objective Measures updated at progress report or POC update only unless otherwise noted.       Treatment     Rachelle received the treatments listed below:       THERAPEUTIC EXERCISES to develop strength, endurance, ROM, flexibility, posture, and core stabilization for (55) minutes including:    Intervention Performed Today    Nustep     Quad sets x 3 minutes bilateral    Bridges  x 3 x 8     Hip series X  X  X  x X 10 abduction  X 10 forward/backward   X 10 arch forward/backward  X 10 forward bicycle    Hip adduction  x 3 x 8    Hip extension   prone   Hip extension with knee flexed X  3 x 8 right, 2 x 8 left (cramping in left hamstring)   Sit to stand     Side steps     Long arch quad      Wedge stretch X   3 x 30 seconds    Hamstring stretch x 2 x 20 seconds sitting      Plan for Next Visit:            Patient Education and Home Exercises       Home Exercises Provided and Patient Education Provided     Education provided: (during session) minutes  PURPOSE: Patient educated on the impairments noted above and the effects of physical therapy intervention to improve overall condition and QOL.   EXERCISE: Patient was educated on all the above exercise prior/during/after for proper posture, positioning, and execution for safe performance with home exercise program.   STRENGTH: Patient educated on the importance of improved core and extremity strength in order to improve alignment of the spine and extremities with static positions and dynamic movement.     Written Home Exercises Provided: yes.  Exercises were reviewed and Rachelle was able to demonstrate them prior to the end of the session.  Rachelle demonstrated good  understanding of the education provided. See EMR under Patient Instructions for exercises provided during therapy sessions.    Assessment     Patient reports cramping with left hip extension with knee flexed. She also reports muscle fatigue in bilateral hips with hip series exercises. At the end of this session, she adds that her right knee felt looser compared to before this session started.     Rachelle is progressing well towards her goals.   Patient prognosis is Good.     Patient will continue to benefit from skilled outpatient physical therapy to address the deficits listed in the problem list box on initial evaluation, provide pt/family education and to maximize patient's level of independence in  the home and community environment.     Patient's spiritual, cultural and educational needs considered and pt agreeable to plan of care and goals.       Anticipated Barriers for therapy: co-morbidities, sedentary lifestyle, chronicity of condition, lack of understanding of condition, lack of support system, coping style, and social background       Goals:  Reviewed: 2/5/2024       Short Term Goals: In 4 weeks  Progress Date   1.Patient to be educated on HEP. [x] Progressing  [] MET   [] Not MET   [] Not tested     2.Patient to increase right knee range of motion, in order to improve available range of motion for ADL's.  [x] Progressing  [] MET   [] Not MET  [] Not tested     3.Patient to increase bilateral LE strength by 1/2 grade, in order to improve endurance and increase ability to perform all functional activities for increased time.  [] Progressing  [] MET   [] Not MET  [] Not tested     4.Patient to have pain less than 5/10 at worst, to improve QOL. [x] Progressing  [] MET   [] Not MET  [] Not tested     5.Patient to improve score on the FOTO, to improve QOL. [x] Progressing  [] MET   [] Not MET  [] Not tested     6. Patient to score on 5 times sit to stand with long term goals made in order to decrease fall risk. [x] Progressing  [] MET   [] Not MET  [] Not tested        Long Term Goals: In 8 weeks Progress Date   1.Patient to improve score on the FOTO predicted score or better, to improve QOL. [x] Progressing  [] MET   [] Not MET  [] Not tested     2. Patient to increase bilateral LE strength to 4+/5 or greater, in order to improve endurance and increase ability to perform all functional activities for increased time. [x] Progressing  [] MET   [] Not MET  [] Not tested     3. Patient to have decreased pain to 2/10 at worst, to improve QOL. [x] Progressing  [] MET   [] Not MET  [] Not tested     4. Patient to have long term goals for score on 5 times sit to stand , in order to improve endurance and decrease  fall risk. [x] Progressing  [] MET   [] Not MET  [] Not tested     5. Patient to perform daily activities including walking on treadmill without increased symptoms. [x] Progressing  [] MET   [] Not MET  [] Not tested                Plan     Continue Plan of Care (POC) and progress per patient tolerance. See treatment section for details on planned progressions next session.      Guanaco Reyes, PTA

## 2024-02-12 ENCOUNTER — CLINICAL SUPPORT (OUTPATIENT)
Dept: REHABILITATION | Facility: HOSPITAL | Age: 69
End: 2024-02-12
Payer: MEDICARE

## 2024-02-12 DIAGNOSIS — R26.9 GAIT ABNORMALITY: Primary | ICD-10-CM

## 2024-02-12 DIAGNOSIS — R53.1 DECREASED RANGE OF MOTION WITH DECREASED STRENGTH: ICD-10-CM

## 2024-02-12 DIAGNOSIS — M25.60 DECREASED RANGE OF MOTION WITH DECREASED STRENGTH: ICD-10-CM

## 2024-02-12 DIAGNOSIS — R52 PAIN: ICD-10-CM

## 2024-02-12 DIAGNOSIS — R68.89 DECREASED FUNCTIONAL ACTIVITY TOLERANCE: ICD-10-CM

## 2024-02-12 PROCEDURE — 97110 THERAPEUTIC EXERCISES: CPT | Mod: CQ

## 2024-02-12 NOTE — PROGRESS NOTES
[facility]   Physical Therapy Treatment Note        Name: Rachelle Philip  [facility] Number: [id number]    Therapy Diagnosis:   Encounter Diagnoses   Name Primary?    Gait abnormality Yes    Pain     Decreased range of motion with decreased strength     Decreased functional activity tolerance      Physician: Gavni Ocampo MD    Visit Date: 2/12/2024      Physician Orders: PT Eval and Treat  Medical Diagnosis from Referral: Chronic pain of both knees  Evaluation Date: 2/5/2024  Authorization Period Expiration: 1/10/2025  Plan of Care Expiration: 4/5/2024  Progress Note Due: 3/5/2024  Visit # / Visits authorized: 2/20 (+ evaluation)  FOTO: 1/3 (last performed on 2/5/2024)     Precautions: Standard, Diabetes, and HTN  PTA Visit #: 2/5     Time In: 1330  Time Out: 1430  Total Billable Time: 55 minutes Billing reflects [place] medicaid guidelines, billing all therapy as therapeutic-exercise     Subjective     Patient reports: more right knee pain than left knee overall. She states that her pain after performing home exercise program is greater than before the home exercise program      She was compliant with home exercise program.    Response to previous treatment: felt okay after evaluation    Functional change: decreased walking tolerance and pain with sudden changes of direction while walking. Standing tolerance limited to about 30 minutes, increased pain with repositioning in bed    Pain: 6/10     Location: right knee medical joint line, 4/10 left knee medical joint line    Treatment was conducted with the aid of  (Michelle # [id number])    Objective      Objective Measures updated at progress report or POC update only unless otherwise noted.       Treatment     Rachelle received the treatments listed below:       THERAPEUTIC EXERCISES to develop strength, endurance, ROM, flexibility, posture, and core stabilization for (46) minutes including:    Intervention Performed Today    Nustep x 5  minutes    Posterior pelvic tilt  x 3 minutes 3 seconds hold with maximum cues   Quad sets  3 minutes bilateral    Straight leg raise  X  3 x 5 bilateral    Bridges  x 3 x 8    Clams (added)  x 3 x 8 bilateral side lying   Hip series        X 10 abduction  X 10 forward/backward   X 10 arch forward/backward  X 10 forward bicycle    Hip adduction   3 x 8    Hip extension   prone   Hip extension with knee flexed   3 x 8 right, 2 x 8 left (cramping in left hamstring)   Sit to stand     Side steps     Long arch quad      Wedge stretch    3 x 30 seconds    Hamstring stretch  2 x 20 seconds sitting      Plan for Next Visit:        MANUAL THERAPY TECHNIQUES were applied for (9) minutes, including:    Manual Intervention Performed Today    Soft Tissue Mobilization [x] right  quadriceps and right knee joint line   Joint Mobilizations []     []     []    Functional Dry Needling  []      Plan for Next Visit: Continue as needed        Patient Education and Home Exercises       Home Exercises Provided and Patient Education Provided     Education provided: (during session) minutes  PURPOSE: Patient educated on the impairments noted above and the effects of physical therapy intervention to improve overall condition and QOL.   EXERCISE: Patient was educated on all the above exercise prior/during/after for proper posture, positioning, and execution for safe performance with home exercise program.   STRENGTH: Patient educated on the importance of improved core and extremity strength in order to improve alignment of the spine and extremities with static positions and dynamic movement.     Written Home Exercises Provided: yes.  Exercises were reviewed and Rachelle was able to demonstrate them prior to the end of the session.  Rachelle demonstrated good  understanding of the education provided. See EMR under Patient Instructions for exercises provided during therapy sessions.    Assessment     Patient reports muscle fatigue in bilateral hips  with clams. She required maximum cues for the correct performance of posterior pelvic tilt and required maximum cues with maintaining core activity with dynamic movements. Less exercises were performed today due to the amount of instructions required for her to perform posterior pelvic tilt correctly and initiated cupping into this session. She left reporting feeling better after cupping.      Rachelle is progressing well towards her goals.   Patient prognosis is Good.     Patient will continue to benefit from skilled outpatient physical therapy to address the deficits listed in the problem list box on initial evaluation, provide pt/family education and to maximize patient's level of independence in the home and community environment.     Patient's spiritual, cultural and educational needs considered and pt agreeable to plan of care and goals.       Anticipated Barriers for therapy: co-morbidities, sedentary lifestyle, chronicity of condition, lack of understanding of condition, lack of support system, coping style, and social background       Goals:  Reviewed: 2/5/2024       Short Term Goals: In 4 weeks  Progress Date   1.Patient to be educated on HEP. [x] Progressing  [] MET   [] Not MET   [] Not tested     2.Patient to increase right knee range of motion, in order to improve available range of motion for ADL's.  [x] Progressing  [] MET   [] Not MET  [] Not tested     3.Patient to increase bilateral LE strength by 1/2 grade, in order to improve endurance and increase ability to perform all functional activities for increased time.  [] Progressing  [] MET   [] Not MET  [] Not tested     4.Patient to have pain less than 5/10 at worst, to improve QOL. [x] Progressing  [] MET   [] Not MET  [] Not tested     5.Patient to improve score on the FOTO, to improve QOL. [x] Progressing  [] MET   [] Not MET  [] Not tested     6. Patient to score on 5 times sit to stand with long term goals made in order to decrease fall risk. [x]  Progressing  [] MET   [] Not MET  [] Not tested        Long Term Goals: In 8 weeks Progress Date   1.Patient to improve score on the FOTO predicted score or better, to improve QOL. [x] Progressing  [] MET   [] Not MET  [] Not tested     2. Patient to increase bilateral LE strength to 4+/5 or greater, in order to improve endurance and increase ability to perform all functional activities for increased time. [x] Progressing  [] MET   [] Not MET  [] Not tested     3. Patient to have decreased pain to 2/10 at worst, to improve QOL. [x] Progressing  [] MET   [] Not MET  [] Not tested     4. Patient to have long term goals for score on 5 times sit to stand , in order to improve endurance and decrease fall risk. [x] Progressing  [] MET   [] Not MET  [] Not tested     5. Patient to perform daily activities including walking on treadmill without increased symptoms. [x] Progressing  [] MET   [] Not MET  [] Not tested                Plan     Continue Plan of Care (POC) and progress per patient tolerance. See treatment section for details on planned progressions next session.      Guanaco Reyes, PTA

## 2024-02-13 ENCOUNTER — DOCUMENTATION ONLY (OUTPATIENT)
Dept: REHABILITATION | Facility: HOSPITAL | Age: 69
End: 2024-02-13
Payer: MEDICARE

## 2024-02-13 NOTE — PROGRESS NOTES
PT/PTA met face to face to discuss pt's treatment plan and progress towards established goals. Pt will be seen by a physical therapist minimally every 6th visit or every 30 days.        Guanaco Reyes PTA

## 2024-02-19 ENCOUNTER — CLINICAL SUPPORT (OUTPATIENT)
Dept: REHABILITATION | Facility: HOSPITAL | Age: 69
End: 2024-02-19
Payer: MEDICARE

## 2024-02-19 DIAGNOSIS — R26.9 GAIT ABNORMALITY: Primary | ICD-10-CM

## 2024-02-19 DIAGNOSIS — R53.1 DECREASED RANGE OF MOTION WITH DECREASED STRENGTH: ICD-10-CM

## 2024-02-19 DIAGNOSIS — M25.60 DECREASED RANGE OF MOTION WITH DECREASED STRENGTH: ICD-10-CM

## 2024-02-19 DIAGNOSIS — R68.89 DECREASED FUNCTIONAL ACTIVITY TOLERANCE: ICD-10-CM

## 2024-02-19 DIAGNOSIS — R52 PAIN: ICD-10-CM

## 2024-02-19 PROCEDURE — 97110 THERAPEUTIC EXERCISES: CPT | Mod: CQ

## 2024-02-19 NOTE — PROGRESS NOTES
[facility]   Physical Therapy Treatment Note        Name: Rachelle Philip  [facility] Number: [id number]    Therapy Diagnosis:   Encounter Diagnoses   Name Primary?    Gait abnormality Yes    Pain     Decreased range of motion with decreased strength     Decreased functional activity tolerance      Physician: Gavin Ocampo MD    Visit Date: 2/19/2024      Physician Orders: PT Eval and Treat  Medical Diagnosis from Referral: Chronic pain of both knees  Evaluation Date: 2/5/2024  Authorization Period Expiration: 1/10/2025  Plan of Care Expiration: 4/5/2024  Progress Note Due: 3/5/2024  Visit # / Visits authorized: 3/20 (+ evaluation)  FOTO: 1/3 (last performed on 2/5/2024)     Precautions: Standard, Diabetes, and HTN  PTA Visit #: 3/5     Time In: 1530  Time Out: 1630  Total Billable Time: 55 minutes Billing reflects [place] medicaid guidelines, billing all therapy as therapeutic-exercise     Subjective     Patient reports: more right knee pain than left knee overall. She states that her pain began to increased this weekend (Saturday) and believes that it may have been due to the cold weather over the weekend. Her left knee is doing much better then her right knee.       She was compliant with home exercise program.    Response to previous treatment: felt okay after last therapy session    Functional change: decreased walking tolerance and pain with sudden changes of direction while walking. Standing tolerance limited to about 30 minutes, increased pain with repositioning in bed    Pain: 7/10     Location: right knee medical joint line, 4/10 left knee medical joint line    Treatment was conducted with the aid of  (Leopoldo # [id number])    Objective      Objective Measures updated at progress report or POC update only unless otherwise noted.       Treatment     Rachelle received the treatments listed below:       THERAPEUTIC EXERCISES to develop strength, endurance, ROM, flexibility,  posture, and core stabilization for (55) minutes including:    Intervention Performed Today    Nustep x 5 minutes    Posterior pelvic tilt  x 3 minutes 3 seconds hold with blood pressure cuff for feedback   Quad sets  3 minutes bilateral    Straight leg raise  X  3 x 5 bilateral    Bridges  x 3 x 8 lower extremities over wedge   Heel slide (added)  x 3 x 5 bilateral    Clams   3 x 8 bilateral side lying   Hip abduction with belt (added)  x 3 minutes 5 seconds hold    Hip series x       3 x 5 abduction (increased)   X 10 forward/backward   X 10 arch forward/backward  X 10 forward bicycle    Hip adduction isometric with ball x 3 minutes 5 seconds hold supine    Hip extension   prone   Hip extension with knee flexed   3 x 8 right, 2 x 8 left (cramping in left hamstring)   Tailgaters (added)  x    Sit to stand     Side steps     Long arch quad      Wedge stretch    3 x 30 seconds    Hamstring stretch  2 x 20 seconds sitting    Short arm knee joint distraction (added)  x 4 x 30 seconds right knee in short sit at edge of mat     Plan for Next Visit:        Patient Education and Home Exercises       Home Exercises Provided and Patient Education Provided     Education provided: (during session) minutes  PURPOSE: Patient educated on the impairments noted above and the effects of physical therapy intervention to improve overall condition and QOL.   EXERCISE: Patient was educated on all the above exercise prior/during/after for proper posture, positioning, and execution for safe performance with home exercise program.   STRENGTH: Patient educated on the importance of improved core and extremity strength in order to improve alignment of the spine and extremities with static positions and dynamic movement.     Written Home Exercises Provided: yes.  Exercises were reviewed and Rachelle was able to demonstrate them prior to the end of the session.  Rachelle demonstrated good  understanding of the education provided. See EMR under Patient  Instructions for exercises provided during therapy sessions.    Assessment     Patient reports more pain today and therefore exercises were modified. Patient requires maximum instructions and visual cue from blood pressure cuff for posterior pelvic tilt which was improved minimally due to weakness in her core.she left this session with less right knee pain compared to before this session.        Rachelle is progressing well towards her goals.   Patient prognosis is Good.     Patient will continue to benefit from skilled outpatient physical therapy to address the deficits listed in the problem list box on initial evaluation, provide pt/family education and to maximize patient's level of independence in the home and community environment.     Patient's spiritual, cultural and educational needs considered and pt agreeable to plan of care and goals.       Anticipated Barriers for therapy: co-morbidities, sedentary lifestyle, chronicity of condition, lack of understanding of condition, lack of support system, coping style, and social background     Goals:  Reviewed: 2/5/2024       Short Term Goals: In 4 weeks  Progress Date   1.Patient to be educated on HEP. [x] Progressing  [] MET   [] Not MET   [] Not tested     2.Patient to increase right knee range of motion, in order to improve available range of motion for ADL's.  [x] Progressing  [] MET   [] Not MET  [] Not tested     3.Patient to increase bilateral LE strength by 1/2 grade, in order to improve endurance and increase ability to perform all functional activities for increased time.  [] Progressing  [] MET   [] Not MET  [] Not tested     4.Patient to have pain less than 5/10 at worst, to improve QOL. [x] Progressing  [] MET   [] Not MET  [] Not tested     5.Patient to improve score on the FOTO, to improve QOL. [x] Progressing  [] MET   [] Not MET  [] Not tested     6. Patient to score on 5 times sit to stand with long term goals made in order to decrease fall risk. [x]  Progressing  [] MET   [] Not MET  [] Not tested        Long Term Goals: In 8 weeks Progress Date   1.Patient to improve score on the FOTO predicted score or better, to improve QOL. [x] Progressing  [] MET   [] Not MET  [] Not tested     2. Patient to increase bilateral LE strength to 4+/5 or greater, in order to improve endurance and increase ability to perform all functional activities for increased time. [x] Progressing  [] MET   [] Not MET  [] Not tested     3. Patient to have decreased pain to 2/10 at worst, to improve QOL. [x] Progressing  [] MET   [] Not MET  [] Not tested     4. Patient to have long term goals for score on 5 times sit to stand , in order to improve endurance and decrease fall risk. [x] Progressing  [] MET   [] Not MET  [] Not tested     5. Patient to perform daily activities including walking on treadmill without increased symptoms. [x] Progressing  [] MET   [] Not MET  [] Not tested                Plan     Continue Plan of Care (POC) and progress per patient tolerance. See treatment section for details on planned progressions next session.      Guanaco Reyes, PTA

## 2024-02-21 ENCOUNTER — HOSPITAL ENCOUNTER (OUTPATIENT)
Dept: RADIOLOGY | Facility: HOSPITAL | Age: 69
Discharge: HOME | End: 2024-02-21
Attending: PAIN MEDICINE
Payer: MEDICARE

## 2024-02-21 ENCOUNTER — OFFICE VISIT (OUTPATIENT)
Dept: PAIN MEDICINE | Facility: CLINIC | Age: 69
End: 2024-02-21
Payer: MEDICARE

## 2024-02-21 VITALS
HEART RATE: 76 BPM | HEIGHT: 60 IN | BODY MASS INDEX: 30.73 KG/M2 | SYSTOLIC BLOOD PRESSURE: 136 MMHG | DIASTOLIC BLOOD PRESSURE: 80 MMHG | WEIGHT: 156.5 LBS

## 2024-02-21 DIAGNOSIS — M54.9 DORSALGIA, UNSPECIFIED: Primary | ICD-10-CM

## 2024-02-21 DIAGNOSIS — M47.896 OTHER SPONDYLOSIS, LUMBAR REGION: ICD-10-CM

## 2024-02-21 DIAGNOSIS — M54.16 LUMBAR RADICULITIS: ICD-10-CM

## 2024-02-21 DIAGNOSIS — M51.36 DDD (DEGENERATIVE DISC DISEASE), LUMBAR: ICD-10-CM

## 2024-02-21 DIAGNOSIS — M54.9 DORSALGIA, UNSPECIFIED: ICD-10-CM

## 2024-02-21 DIAGNOSIS — M79.18 MYOFASCIAL PAIN SYNDROME, CERVICAL: ICD-10-CM

## 2024-02-21 DIAGNOSIS — M54.2 CHRONIC NECK PAIN: ICD-10-CM

## 2024-02-21 DIAGNOSIS — G89.29 CHRONIC NECK PAIN: ICD-10-CM

## 2024-02-21 PROCEDURE — 72148 MRI LUMBAR SPINE W/O DYE: CPT | Mod: 26,,, | Performed by: RADIOLOGY

## 2024-02-21 PROCEDURE — 99213 OFFICE O/P EST LOW 20 MIN: CPT | Mod: PBBFAC,25 | Performed by: PAIN MEDICINE

## 2024-02-21 PROCEDURE — 72148 MRI LUMBAR SPINE W/O DYE: CPT | Mod: TC

## 2024-02-21 PROCEDURE — 99204 OFFICE O/P NEW MOD 45 MIN: CPT | Mod: S$PBB,,, | Performed by: PAIN MEDICINE

## 2024-02-21 PROCEDURE — 99999 PR PBB SHADOW E&M-EST. PATIENT-LVL III: CPT | Mod: PBBFAC,,, | Performed by: PAIN MEDICINE

## 2024-02-21 RX ORDER — NABUMETONE 500 MG/1
500 TABLET, FILM COATED ORAL 2 TIMES DAILY
Qty: 30 TABLET | Refills: 0 | Status: SHIPPED | OUTPATIENT
Start: 2024-02-21 | End: 2024-05-23

## 2024-02-21 RX ORDER — GABAPENTIN 100 MG/1
100 CAPSULE ORAL 3 TIMES DAILY
Qty: 90 CAPSULE | Refills: 11 | Status: SHIPPED | OUTPATIENT
Start: 2024-02-21 | End: 2024-03-20

## 2024-02-21 NOTE — PROGRESS NOTES
New Patient Chronic Pain Note (Initial Visit)    Referring Physician: Ruddy Peacock*    PCP: Faina Peacock MD    Chief Complaint:   Chief Complaint   Patient presents with    Low-back Pain    Neck Pain        SUBJECTIVE:    02/21/2024 Initial visit:  Interview today was conducted through an .  Rachelle Philip is a 68 y.o. female who presents to the clinic for the evaluation of chronic low back pain (5 years) that has gotten severe over the past 3 years.  No triggering events.  It radiates to the right buttock, outer thigh, outer leg and in her leg to the ankle (L4/L5 dermatomes).  Reports intermittent tingling and numbness to the right foot and intermittent weakness in the right leg.  Denies falls or loss of bowel or bladder control.  The pain is worse with prolonged walking and standing and better with rest.  Exercise helps a little.  She currently rates it at 7/10 and is worst at 10/10 and best at 10/10.  She also has chronic neck pain that is axial and that has gotten worse over the past 3 years.  Her lower back pain is worse than the neck pain.  It is affecting her ADLs.      Patient denies night fever/night sweats.    Treatment History:  Temporary relief with Tylenol.  Partial relief with the exercises and physical therapy.  No relief with meloxicam.  Currently doing PT for her knee but not specifically for the back.    Radiological workup:  Last MRI of the lumbar spine was in 2018.  Detailed below.    Pain Disability Index Review:          No data to display                 report:  Reviewed and consistent with medication use as prescribed.      Past Medical History:   Diagnosis Date    Diabetes mellitus     Hypertension      Past Surgical History:   Procedure Laterality Date    TUBAL LIGATION       Social History     Socioeconomic History    Marital status: Single   Occupational History    Occupation: caregiver   Tobacco Use    Smoking status: Never     Passive exposure:  "Never    Smokeless tobacco: Never   Substance and Sexual Activity    Alcohol use: Never    Drug use: Never    Sexual activity: Not Currently     Family History   Problem Relation Age of Onset    Diabetes Mother     Heart disease Mother         ?    Hypertension Mother        Review of patient's allergies indicates:  No Known Allergies    Current Outpatient Medications   Medication Sig    aspirin (ECOTRIN) 81 MG EC tablet Take 81 mg by mouth once daily.    atorvastatin (LIPITOR) 40 MG tablet TAKE 1 TABLET BY MOUTH ONCE DAILY AT BEDTIME.    hydrOXYzine pamoate (VISTARIL) 50 MG Cap Take 50 mg by mouth every evening.    insulin aspart protamine-insulin aspart (NOVOLOG MIX 70-30FLEXPEN U-100) 100 unit/mL (70-30) InPn pen 45 u  sq BID. Dispense  6  boxes    losartan (COZAAR) 100 MG tablet TAKE 1 TABLET BY MOUTH ONCE DAILY    metFORMIN (GLUCOPHAGE) 1000 MG tablet Take 1 tablet (1,000 mg total) by mouth daily with breakfast.    pen needle, diabetic (BD BRANT 2ND GEN PEN NEEDLE) 32 gauge x 5/32" Ndle BD Brant 2nd Gen Pen Needle 32 gauge x 5/32"  USE TO INJECT INSULIN TWICE DAILY    TRUE METRIX GLUCOSE TEST STRIP Strp Check glucose TID    ergocalciferol (ERGOCALCIFEROL) 50,000 unit Cap ergocalciferol (vitamin D2) 1,250 mcg (50,000 unit) capsule   TAKE 1 CAPSULE BY MOUTH ONCE A WEEK    gabapentin (NEURONTIN) 100 MG capsule Take 1 capsule (100 mg total) by mouth 3 (three) times daily.    nabumetone (RELAFEN) 500 MG tablet Take 1 tablet (500 mg total) by mouth 2 (two) times daily.     No current facility-administered medications for this visit.       Review of Systems     GENERAL:  No weight loss, malaise or fevers.  HEENT:   No recent changes in vision or hearing  NECK:  Negative for lumps, no difficulty with swallowing.  RESPIRATORY:  Negative for cough, wheezing or shortness of breath, patient denies any recent URI.  CARDIOVASCULAR:  Negative for chest pain, leg swelling or palpitations.  GI:  Negative for abdominal discomfort, " blood in stools or black stools or change in bowel habits.  MUSCULOSKELETAL:  See HPI.  SKIN:  Negative for lesions, rash, and itching.  PSYCH:  No mood disorder or recent psychosocial stressors.  Patients sleep is not disturbed secondary to pain.  HEMATOLOGY/LYMPHOLOGY:  Negative for prolonged bleeding, bruising easily or swollen nodes.  Patient is not currently taking any anti-coagulants  NEURO:   No history of headaches, syncope, paralysis, seizures or tremors.  All other reviewed and negative other than HPI.    OBJECTIVE:    /80   Pulse 76   Ht 5' (1.524 m)   Wt 71 kg (156 lb 8.4 oz)   BMI 30.57 kg/m²         Physical Exam    GENERAL: Well appearing, in no acute distress, alert and oriented x3.  PSYCH:  Mood and affect appropriate.  SKIN: Skin color, texture, turgor normal, no rashes or lesions.  HEAD/FACE:  Normocephalic, atraumatic.  CV: No edema.  PULM: No evidence of respiratory difficulty, symmetric chest rise.  GI:  Abdomen soft and non-tender.    GAIT: slow .    CERVICAL SPINE:  Palpation: Tenderness over bilateral facet joints and paraspinous muscles. No trigger points.  ROM: Pain with flexion, extension, rotation and lateral flexion. Spurling's negative.    LUMBAR SPINE:   Palpation: Tenderness over bilateral facet joints and paraspinous muscles.   ROM: Pain with flexion, extension and rotation.  Straight leg raising is positive on the right.  SI JOINTS: bilateral SI joints, no tenderness, negative Donny's (lower back pain), Gaenslen's, thigh thrust, SI distraction and compression.    HIPS: normal and nonpainful ROM of both hip joints.    NEURO:   MOTOR: Bilateral upper and lower extremity muscle strength is normal. No atrophy or tone abnormalities are noted.  SENSORY: No loss of sensation in C4 through T1 dermatomes bilaterally, and in L3 through S1 dermatomes except for diminished pinprick sensation over the right S1 dermatome.  DTR's: Reflexes are physiologic and symmetric in upper and  lower extremities except for weak right knee reflex. No clonus.  Negative Tovar's bilaterally.     Imaging:     Lumbar spine MRI  06/26/18     Grade 1 spondylolisthesis at the L3-4 and L4-5 levels. L5 is a transitional vertebra.    Bilateral L3/4 bony neural foraminal narrowing with possible but not definite L3 nerve root impingements.  L4-L5: right-sided neural foraminal narrowing and right L4 nerve root impingement. Moderate central spinal stenosis.      LABS:  Lab Results   Component Value Date    WBC 6.94 11/30/2023    HGB 12.5 11/30/2023    HCT 37.6 11/30/2023    MCV 89 11/30/2023     11/30/2023       CMP  Sodium   Date Value Ref Range Status   09/25/2023 139 136 - 145 mmol/L Final     Potassium   Date Value Ref Range Status   09/25/2023 4.6 3.5 - 5.1 mmol/L Final     Chloride   Date Value Ref Range Status   09/25/2023 104 95 - 110 mmol/L Final     CO2   Date Value Ref Range Status   09/25/2023 28 23 - 29 mmol/L Final     Glucose   Date Value Ref Range Status   09/25/2023 139 (H) 70 - 110 mg/dL Final     BUN   Date Value Ref Range Status   09/25/2023 16 8 - 23 mg/dL Final     Creatinine   Date Value Ref Range Status   09/25/2023 0.8 0.5 - 1.4 mg/dL Final     Calcium   Date Value Ref Range Status   09/25/2023 9.6 8.7 - 10.5 mg/dL Final     Total Protein   Date Value Ref Range Status   09/25/2023 7.8 6.0 - 8.4 g/dL Final     Albumin   Date Value Ref Range Status   09/25/2023 4.3 3.5 - 5.2 g/dL Final     Total Bilirubin   Date Value Ref Range Status   09/25/2023 0.5 0.1 - 1.0 mg/dL Final     Comment:     For infants and newborns, interpretation of results should be based  on gestational age, weight and in agreement with clinical  observations.    Premature Infant recommended reference ranges:  Up to 24 hours.............<8.0 mg/dL  Up to 48 hours............<12.0 mg/dL  3-5 days..................<15.0 mg/dL  6-29 days.................<15.0 mg/dL       Alkaline Phosphatase   Date Value Ref Range Status    09/25/2023 78 55 - 135 U/L Final     AST   Date Value Ref Range Status   09/25/2023 22 10 - 40 U/L Final     ALT   Date Value Ref Range Status   09/25/2023 29 10 - 44 U/L Final     Anion Gap   Date Value Ref Range Status   09/25/2023 7 (L) 8 - 16 mmol/L Final       Lab Results   Component Value Date    HGBA1C 6.9 (H) 09/25/2023             ASSESSMENT: 68 y.o. year old female with:    1. Dorsalgia, unspecified  MRI Lumbar Spine Without Contrast      2. DDD (degenerative disc disease), lumbar  Ambulatory referral/consult to [facility]    nabumetone (RELAFEN) 500 MG tablet    gabapentin (NEURONTIN) 100 MG capsule      3. Lumbar radiculitis  gabapentin (NEURONTIN) 100 MG capsule    Ambulatory referral/consult to Physical/Occupational Therapy      4. Chronic neck pain  Ambulatory referral/consult to Physical/Occupational Therapy      5. Myofascial pain syndrome, cervical  Ambulatory referral/consult to Physical/Occupational Therapy      6. Other spondylosis, lumbar region                PLAN:     We will refer to physical therapy to work on the back and the neck.  We will order an updated lumbar spine MRI.  Her last was in 2018 and it showed moderate L4-5 spinal stenosis and L3-4 and L4-5 foraminal stenosis.  We will prescribe nabumetone and gabapentin which will be titrated.  Follow-up in 1 month to evaluate her progress after physical therapy and go over the lumbar MRI findings.  Counseled patient regarding the importance of activity modification.  Patient Questions: Answered all of the patient's questions regarding diagnosis, therapy, and treatment.  Records:Reviewed old records from treating physicians and imaging .    The above plan and management options were discussed at length with patient. Patient is in agreement with the above and verbalized understanding.      Jose Betts MD  Interventional Pain Management  [facility]    Disclaimer:  This note was prepared using voice recognition system  and is likely to have sound alike errors that may have been overlooked even after proof reading.  Please call me with any questions

## 2024-02-26 ENCOUNTER — CLINICAL SUPPORT (OUTPATIENT)
Dept: REHABILITATION | Facility: HOSPITAL | Age: 69
End: 2024-02-26
Payer: MEDICARE

## 2024-02-26 DIAGNOSIS — R52 PAIN: ICD-10-CM

## 2024-02-26 DIAGNOSIS — R68.89 DECREASED FUNCTIONAL ACTIVITY TOLERANCE: ICD-10-CM

## 2024-02-26 DIAGNOSIS — R26.9 GAIT ABNORMALITY: Primary | ICD-10-CM

## 2024-02-26 DIAGNOSIS — R53.1 DECREASED RANGE OF MOTION WITH DECREASED STRENGTH: ICD-10-CM

## 2024-02-26 DIAGNOSIS — M25.60 DECREASED RANGE OF MOTION WITH DECREASED STRENGTH: ICD-10-CM

## 2024-02-26 PROCEDURE — 97110 THERAPEUTIC EXERCISES: CPT | Mod: CQ

## 2024-02-26 NOTE — PROGRESS NOTES
[facility]   Physical Therapy Treatment Note        Name: Rachelle Philip  Clinic Number: [id number]    Therapy Diagnosis:   Encounter Diagnoses   Name Primary?    Gait abnormality Yes    Pain     Decreased range of motion with decreased strength     Decreased functional activity tolerance      Physician: Gavin Ocampo MD    Visit Date: 2/26/2024      Physician Orders: PT Eval and Treat  Medical Diagnosis from Referral: Chronic pain of both knees  Evaluation Date: 2/5/2024  Authorization Period Expiration: 1/10/2025  Plan of Care Expiration: 4/5/2024  Progress Note Due: 3/5/2024  Visit # / Visits authorized: 4/20 (+ evaluation)  FOTO: 1/3 (last performed on 2/5/2024)     Precautions: Standard, Diabetes, and HTN  PTA Visit #: 4/5     Time In: 1445  Time Out: 1530  Total Billable Time: 45 minutes Billing reflects [place] medicaid guidelines, billing all therapy as therapeutic-exercise     Subjective     Patient reports: less right knee pain today. Having low back pain. Had MRI a few days. Pain radiates into her right foot     She was compliant with home exercise program.    Response to previous treatment: felt okay after last therapy session    Functional change: decreased walking tolerance and pain with sudden changes of direction while walking. Standing tolerance limited to about 30 minutes, increased pain with repositioning in bed. Due to back pain: upon standing, she is occasionally unable to walk     Pain: 2/10     Location: right knee medical joint line, 4/10 left knee medical joint line    Treatment was conducted with the aid of  (Brijesh # [id number])    Objective      Objective Measures updated at progress report or POC update only unless otherwise noted.     Treatment     Rachelle received the treatments listed below:     THERAPEUTIC EXERCISES to develop strength, endurance, ROM, flexibility, posture, and core stabilization for (45) minutes including:    Intervention Performed  Today    Nustep x 5 minutes    Posterior pelvic tilt   3 minutes 3 seconds hold with blood pressure cuff for feedback   Quad sets  3 minutes bilateral    Straight leg raise  X  3 x 5 bilateral    Bridges  x 3 x 8 lower extremities over wedge   Heel slide x 2 x 10 bilateral    Clams  x 2 x 10  bilateral side lying   Hip abduction with belt  x 3 minutes 5 seconds hold    Hip series        3 x 5 abduction   X 10 forward/backward   X 10 arch forward/backward  X 10 forward bicycle    Hip adduction isometric with ball x 3 minutes 5 seconds hold supine    Hip extension   prone   Hip extension with knee flexed   3 x 8 right, 2 x 8 left (cramping in left hamstring)   Tailgaters (added)  x    Sit to stand x X 5 mat at 24 inch no hands    Heel raises  x 2 x 10 1 hand support   Side steps     Long arch quad  x 2 x 8 bilateral    Wedge stretch    3 x 30 seconds    Hamstring stretch  2 x 20 seconds sitting    Short arm knee joint distraction (added)   4 x 30 seconds right knee in short sit at edge of mat     Plan for Next Visit:        Patient Education and Home Exercises       Home Exercises Provided and Patient Education Provided     Education provided: (during session) minutes  PURPOSE: Patient educated on the impairments noted above and the effects of physical therapy intervention to improve overall condition and QOL.   EXERCISE: Patient was educated on all the above exercise prior/during/after for proper posture, positioning, and execution for safe performance with home exercise program.   STRENGTH: Patient educated on the importance of improved core and extremity strength in order to improve alignment of the spine and extremities with static positions and dynamic movement.     Written Home Exercises Provided: yes.  Exercises were reviewed and Rachelle was able to demonstrate them prior to the end of the session.  Rachelle demonstrated good  understanding of the education provided. See EMR under Patient Instructions for exercises  provided during therapy sessions.    Assessment     Patient with less time this session due to her arriving at the [place] but not getting checked in. She completed exercises this session with less pain in her right knee and reports bilateral hip muscle fatigue with side lying clams. She was able to be progressed with heel raises is standing and with sit to stand but did reports increased low back pain with this exercise.     Rachelle is progressing well towards her goals.   Patient prognosis is Good.     Patient will continue to benefit from skilled outpatient physical therapy to address the deficits listed in the problem list box on initial evaluation, provide pt/family education and to maximize patient's level of independence in the home and community environment.     Patient's spiritual, cultural and educational needs considered and pt agreeable to plan of care and goals.       Anticipated Barriers for therapy: co-morbidities, sedentary lifestyle, chronicity of condition, lack of understanding of condition, lack of support system, coping style, and social background     Goals:  Reviewed: 2/5/2024       Short Term Goals: In 4 weeks  Progress Date   1.Patient to be educated on HEP. [x] Progressing  [] MET   [] Not MET   [] Not tested     2.Patient to increase right knee range of motion, in order to improve available range of motion for ADL's.  [x] Progressing  [] MET   [] Not MET  [] Not tested     3.Patient to increase bilateral LE strength by 1/2 grade, in order to improve endurance and increase ability to perform all functional activities for increased time.  [] Progressing  [] MET   [] Not MET  [] Not tested     4.Patient to have pain less than 5/10 at worst, to improve QOL. [x] Progressing  [] MET   [] Not MET  [] Not tested     5.Patient to improve score on the FOTO, to improve QOL. [x] Progressing  [] MET   [] Not MET  [] Not tested     6. Patient to score on 5 times sit to stand with long term goals made in  order to decrease fall risk. [x] Progressing  [] MET   [] Not MET  [] Not tested        Long Term Goals: In 8 weeks Progress Date   1.Patient to improve score on the FOTO predicted score or better, to improve QOL. [x] Progressing  [] MET   [] Not MET  [] Not tested     2. Patient to increase bilateral LE strength to 4+/5 or greater, in order to improve endurance and increase ability to perform all functional activities for increased time. [x] Progressing  [] MET   [] Not MET  [] Not tested     3. Patient to have decreased pain to 2/10 at worst, to improve QOL. [x] Progressing  [] MET   [] Not MET  [] Not tested     4. Patient to have long term goals for score on 5 times sit to stand , in order to improve endurance and decrease fall risk. [x] Progressing  [] MET   [] Not MET  [] Not tested     5. Patient to perform daily activities including walking on treadmill without increased symptoms. [x] Progressing  [] MET   [] Not MET  [] Not tested                Plan     Continue Plan of Care (POC) and progress per patient tolerance. See treatment section for details on planned progressions next session.      Guanaco Reyes, PTA

## 2024-02-27 DIAGNOSIS — Z00.00 ENCOUNTER FOR MEDICARE ANNUAL WELLNESS EXAM: ICD-10-CM

## 2024-02-28 ENCOUNTER — CLINICAL SUPPORT (OUTPATIENT)
Dept: REHABILITATION | Facility: HOSPITAL | Age: 69
End: 2024-02-28
Payer: MEDICARE

## 2024-02-28 DIAGNOSIS — R68.89 DECREASED FUNCTIONAL ACTIVITY TOLERANCE: ICD-10-CM

## 2024-02-28 DIAGNOSIS — M25.60 DECREASED RANGE OF MOTION WITH DECREASED STRENGTH: ICD-10-CM

## 2024-02-28 DIAGNOSIS — R52 PAIN: ICD-10-CM

## 2024-02-28 DIAGNOSIS — R26.9 GAIT ABNORMALITY: Primary | ICD-10-CM

## 2024-02-28 DIAGNOSIS — R53.1 DECREASED RANGE OF MOTION WITH DECREASED STRENGTH: ICD-10-CM

## 2024-02-28 PROCEDURE — 97110 THERAPEUTIC EXERCISES: CPT

## 2024-02-28 NOTE — PROGRESS NOTES
[facility] [place] [facility]   Physical Therapy Treatment Note        Name: Rachelle Philip  [facility] Number: [id number]    Therapy Diagnosis:   Encounter Diagnoses   Name Primary?    Gait abnormality Yes    Pain     Decreased range of motion with decreased strength     Decreased functional activity tolerance        Physician: Gavin Ocampo MD    Visit Date: 2/28/2024      Physician Orders: PT Eval and Treat  Medical Diagnosis from Referral: Chronic pain of both knees  Evaluation Date: 2/5/2024  Authorization Period Expiration: 12/31/24  Plan of Care Expiration: 4/5/2024  Progress Note Due: 3/5/2024  Visit # / Visits authorized: 5/20 (+ evaluation)  FOTO: 1/3 (last performed on 2/5/2024)     Precautions: Standard, Diabetes, and HTN  PTA Visit #: 4/5     Time In: 3:00 pm  Time Out: 4:00 pm  Total Billable Time: 48 minutes   Billing reflects one on one time spent with patient. Billing reflects [place] medicaid guidelines, billing all therapy as therapeutic-exercise      Subjective     All treatment and subjective/objective communicated with patient through the aid of an .    Patient reports: less right knee pain today. She is still having some LBP and she indicates pain from gluteal muscles around hip and down front of leg to right knee and shin. She reports that she did see someone for her back but is not sure that her evaluation.    She was compliant with home exercise program.    Response to previous treatment: felt okay after last therapy session    Functional change: decreased walking tolerance and pain with sudden changes of direction while walking. Standing tolerance limited to about 30 minutes, increased pain with repositioning in bed. Due to back pain: upon standing, she is occasionally unable to walk     Pain: 2-3/10     Location: right knee medical joint line, 4/10 left knee medical joint line         Objective      Objective Measures updated at progress report or POC update only unless  otherwise noted.     Treatment     Rachelle received the treatments listed below:     THERAPEUTIC EXERCISES to develop strength, endurance, ROM, flexibility, posture, and core stabilization for (18) minutes including:    Intervention Performed Today    Nustep  5 minutes    Bike x 5 min   Posterior pelvic tilt   3 minutes 3 seconds hold with blood pressure cuff for feedback   Quad sets  3 minutes bilateral    Heel slide  2 x 10 bilateral    Clamshells supine  - 3 min - PINK LOOP with PPT   Hip abduction with belt   3 minutes 5 seconds hold    Hip adduction isometric with ball x 3 minutes 5 seconds hold supine    Hip extension DC  prone   Hip extension with knee flexed  DC 3 x 8 right, 2 x 8 left (cramping in left hamstring)   Tailgaters (added)      Long arc quad  x 10x/ 3 sets 5 sec hold   Wedge stretch    3 x 30 seconds    Hamstring stretch  2 x 20 seconds sitting    Short arm knee joint distraction (added)   4 x 30 seconds right knee in short sit at edge of mat     Rachelle participated in neuromuscular re-education activities to improve: Balance, Coordination, Proprioception, and Posture for 30 minutes. The following activities were included:      Intervention 12/12/2023  Parameters   Sit to stand x 5x/ 3 sets  5#KB   Heel raises  - 2 x 10 1 hand support   Side steps     Hip series x  x    x Clamshell 10x/2 sets  10x/ 2 sets abduction   x 10 forward/backward   x5 / 2 sets circles CW/CCW  x 10 forward bicycle    Straight leg raise with PPT x 10x/ 2 sets bilateral    Bridges with PPT x 3 x 8              Plan for Next Visit: Progress as able - avoid LB extension       Patient Education and Home Exercises       Home Exercises Provided and Patient Education Provided     Education provided: (during session) minutes  PURPOSE: Patient educated on the impairments noted above and the effects of physical therapy intervention to improve overall condition and QOL.   EXERCISE: Patient was educated on all the above exercise  prior/during/after for proper posture, positioning, and execution for safe performance with home exercise program.   STRENGTH: Patient educated on the importance of improved core and extremity strength in order to improve alignment of the spine and extremities with static positions and dynamic movement.     Written Home Exercises Provided: patient was instructed to continue with prior home exercise program and use bicycle instead of treadmill.  Exercises were reviewed and Rachelle was able to demonstrate them prior to the end of the session.  Rachelle demonstrated good  understanding of the education provided. See EMR under Patient Instructions for exercises provided during therapy sessions.    Assessment     Patient was able to tolerate today's treatment with reports of decreased pain, increased time for all activities today with use of  and all exercises with extensive verbal, visual cues for maintaining PPT during all lower extremities exercises' today. She was instructed to stop using treadmill and start to use bike for exercise in the morning to see if decreases complaints of leg pain. Decreased pain with sit to stand with 5#KB to engage core muscle's.    Rachelle is progressing well towards her goals.   Patient prognosis is Good.     Patient will continue to benefit from skilled outpatient physical therapy to address the deficits listed in the problem list box on initial evaluation, provide pt/family education and to maximize patient's level of independence in the home and community environment.     Patient's spiritual, cultural and educational needs considered and pt agreeable to plan of care and goals.       Anticipated Barriers for therapy: co-morbidities, sedentary lifestyle, chronicity of condition, lack of understanding of condition, lack of support system, coping style, and social background     Goals:  Reviewed: 2/28/2024      Short Term Goals: In 4 weeks  Progress Date   1.Patient to be educated on HEP.  [x] Progressing  [] MET   [] Not MET   [] Not tested     2.Patient to increase right knee range of motion, in order to improve available range of motion for ADL's.  [x] Progressing  [] MET   [] Not MET  [] Not tested     3.Patient to increase bilateral LE strength by 1/2 grade, in order to improve endurance and increase ability to perform all functional activities for increased time.  [] Progressing  [] MET   [] Not MET  [] Not tested     4.Patient to have pain less than 5/10 at worst, to improve QOL. [x] Progressing  [] MET   [] Not MET  [] Not tested     5.Patient to improve score on the FOTO, to improve QOL. [x] Progressing  [] MET   [] Not MET  [] Not tested     6. Patient to score on 5 times sit to stand with long term goals made in order to decrease fall risk. [x] Progressing  [] MET   [] Not MET  [] Not tested        Long Term Goals: In 8 weeks Progress Date   1.Patient to improve score on the FOTO predicted score or better, to improve QOL. [x] Progressing  [] MET   [] Not MET  [] Not tested     2. Patient to increase bilateral LE strength to 4+/5 or greater, in order to improve endurance and increase ability to perform all functional activities for increased time. [x] Progressing  [] MET   [] Not MET  [] Not tested     3. Patient to have decreased pain to 2/10 at worst, to improve QOL. [x] Progressing  [] MET   [] Not MET  [] Not tested     4. Patient to have long term goals for score on 5 times sit to stand , in order to improve endurance and decrease fall risk. [x] Progressing  [] MET   [] Not MET  [] Not tested     5. Patient to perform daily activities including walking on treadmill without increased symptoms. [x] Progressing  [] MET   [] Not MET  [] Not tested                Plan     Monitor response to today's treatment session and progress with Physical Therapy plan of care as indicated.    Plan to evaluate LB on 3/6/24.      Geni Edwards, PT

## 2024-02-29 ENCOUNTER — OFFICE VISIT (OUTPATIENT)
Dept: FAMILY MEDICINE | Facility: CLINIC | Age: 69
End: 2024-02-29
Attending: FAMILY MEDICINE
Payer: MEDICARE

## 2024-02-29 VITALS
DIASTOLIC BLOOD PRESSURE: 82 MMHG | HEIGHT: 60 IN | SYSTOLIC BLOOD PRESSURE: 138 MMHG | OXYGEN SATURATION: 99 % | HEART RATE: 71 BPM | WEIGHT: 157.5 LBS | TEMPERATURE: 97 F | BODY MASS INDEX: 30.92 KG/M2

## 2024-02-29 DIAGNOSIS — Z79.4 TYPE 2 DIABETES MELLITUS WITHOUT COMPLICATION, WITH LONG-TERM CURRENT USE OF INSULIN: ICD-10-CM

## 2024-02-29 DIAGNOSIS — Z12.11 COLON CANCER SCREENING: Primary | ICD-10-CM

## 2024-02-29 DIAGNOSIS — I10 PRIMARY HYPERTENSION: ICD-10-CM

## 2024-02-29 DIAGNOSIS — E11.9 TYPE 2 DIABETES MELLITUS WITHOUT COMPLICATION, WITH LONG-TERM CURRENT USE OF INSULIN: ICD-10-CM

## 2024-02-29 DIAGNOSIS — E55.9 VITAMIN D DEFICIENCY: ICD-10-CM

## 2024-02-29 PROBLEM — R68.89 DECREASED FUNCTIONAL ACTIVITY TOLERANCE: Status: RESOLVED | Noted: 2024-02-05 | Resolved: 2024-02-29

## 2024-02-29 PROBLEM — R52 PAIN: Status: RESOLVED | Noted: 2024-02-05 | Resolved: 2024-02-29

## 2024-02-29 PROBLEM — R26.9 GAIT ABNORMALITY: Status: RESOLVED | Noted: 2024-02-05 | Resolved: 2024-02-29

## 2024-02-29 PROCEDURE — 99214 OFFICE O/P EST MOD 30 MIN: CPT | Mod: S$PBB,,, | Performed by: FAMILY MEDICINE

## 2024-02-29 PROCEDURE — 99214 OFFICE O/P EST MOD 30 MIN: CPT | Mod: PBBFAC,PO | Performed by: FAMILY MEDICINE

## 2024-02-29 PROCEDURE — 99999 PR PBB SHADOW E&M-EST. PATIENT-LVL IV: CPT | Mod: PBBFAC,,, | Performed by: FAMILY MEDICINE

## 2024-02-29 RX ORDER — CALCIUM CITRATE/VITAMIN D3 200MG-6.25
TABLET ORAL
Qty: 270 STRIP | Refills: 1 | Status: SHIPPED | OUTPATIENT
Start: 2024-02-29

## 2024-02-29 RX ORDER — PEN NEEDLE, DIABETIC 30 GX3/16"
NEEDLE, DISPOSABLE MISCELLANEOUS
Qty: 180 EACH | Refills: 1 | Status: SHIPPED | OUTPATIENT
Start: 2024-02-29

## 2024-02-29 RX ORDER — INSULIN ASPART 100 [IU]/ML
INJECTION, SUSPENSION SUBCUTANEOUS
Qty: 6 EACH | Refills: 0 | Status: SHIPPED | OUTPATIENT
Start: 2024-02-29

## 2024-02-29 NOTE — PROGRESS NOTES
Subjective:       Patient ID: Rachelle Philip is a 68 y.o. female.    Chief Complaint: Follow-up    68 y old  female with t2 dm , htn , dlp , overweight here for f.u .   Pre meal  glucose :140 , fastin : 110 . Opth : Seen 3 m ago at clinic on [address]. She exercises 3 times: walks and does Yoga .  She had [place] done at [facility] : 5 y ago .     Follow-up      Review of Systems   Constitutional: Negative.    HENT: Negative.     Eyes: Negative.    Respiratory: Negative.     Cardiovascular: Negative.    Gastrointestinal: Negative.    Genitourinary: Negative.    Musculoskeletal: Negative.    Skin: Negative.    Hematological: Negative.        Objective:      Physical Exam  Constitutional:       General: She is not in acute distress.     Appearance: She is well-developed. She is not diaphoretic.   HENT:      Head: Normocephalic and atraumatic.      Right Ear: External ear normal.      Left Ear: External ear normal.      Mouth/Throat:      Pharynx: No oropharyngeal exudate.   Eyes:      General: No scleral icterus.        Right eye: No discharge.         Left eye: No discharge.      Conjunctiva/sclera: Conjunctivae normal.      Pupils: Pupils are equal, round, and reactive to light.   Neck:      Thyroid: No thyromegaly.      Vascular: No JVD.      Trachea: No tracheal deviation.   Cardiovascular:      Rate and Rhythm: Normal rate and regular rhythm.      Heart sounds: Normal heart sounds. No murmur heard.     No friction rub. No gallop.   Pulmonary:      Effort: Pulmonary effort is normal. No respiratory distress.      Breath sounds: Normal breath sounds. No stridor. No wheezing or rales.   Chest:      Chest wall: No tenderness.   Abdominal:      General: Bowel sounds are normal. There is no distension.      Palpations: Abdomen is soft.      Tenderness: There is no abdominal tenderness. There is no guarding or rebound.   Musculoskeletal:         General: Normal range of motion.      Cervical back: Normal range of motion and neck  "supple.   Lymphadenopathy:      Cervical: No cervical adenopathy.   Skin:     General: Skin is warm and dry.   Neurological:      Mental Status: She is alert and oriented to person, place, and time.   Psychiatric:         Behavior: Behavior normal.         Thought Content: Thought content normal.         Judgment: Judgment normal.         Assessment:       1. Colon cancer screening    2. Type 2 diabetes mellitus without complication, with long-term current use of insulin    3. Primary hypertension    4. Vitamin D deficiency        Plan:     Rachelle was seen today for follow-up.    Diagnoses and all orders for this visit:    Colon cancer screening  -     Fecal Immunochemical Test (iFOBT); Future    Type 2 diabetes mellitus without complication, with long-term current use of insulin  -     TRUE METRIX GLUCOSE TEST STRIP Strp; Check glucose TID  -     CBC Auto Differential; Future  -     Comprehensive Metabolic Panel; Future  -     Hemoglobin A1C; Future  -     Lipid Panel; Future    Primary hypertension  -     CBC Auto Differential; Future  -     Comprehensive Metabolic Panel; Future  -     Hemoglobin A1C; Future  -     Lipid Panel; Future    Vitamin D deficiency  -     Vitamin D; Future    Other orders  -     insulin aspart protamine-insulin aspart (NOVOLOG MIX 70-30FLEXPEN U-100) 100 unit/mL (70-30) InPn pen; 45 u  sq BID. Dispense  6  boxes  -     pen needle, diabetic (BD BRANT 2ND GEN PEN NEEDLE) 32 gauge x 5/32" Ndle; BD Brant 2nd Gen Pen Needle 32 gauge x 5/32"  USE TO INJECT INSULIN TWICE DAILY    Diet and exercise   Controlled. Cont med   "

## 2024-03-01 ENCOUNTER — LAB VISIT (OUTPATIENT)
Dept: LAB | Facility: HOSPITAL | Age: 69
End: 2024-03-01
Attending: FAMILY MEDICINE
Payer: MEDICARE

## 2024-03-01 DIAGNOSIS — I10 PRIMARY HYPERTENSION: ICD-10-CM

## 2024-03-01 DIAGNOSIS — E11.9 TYPE 2 DIABETES MELLITUS WITHOUT COMPLICATION, WITH LONG-TERM CURRENT USE OF INSULIN: ICD-10-CM

## 2024-03-01 DIAGNOSIS — Z79.4 TYPE 2 DIABETES MELLITUS WITHOUT COMPLICATION, WITH LONG-TERM CURRENT USE OF INSULIN: ICD-10-CM

## 2024-03-01 DIAGNOSIS — E55.9 VITAMIN D DEFICIENCY: ICD-10-CM

## 2024-03-01 LAB
25(OH)D3+25(OH)D2 SERPL-MCNC: 35 NG/ML (ref 30–96)
ALBUMIN SERPL BCP-MCNC: 4.1 G/DL (ref 3.5–5.2)
ALP SERPL-CCNC: 78 U/L (ref 55–135)
ALT SERPL W/O P-5'-P-CCNC: 24 U/L (ref 10–44)
ANION GAP SERPL CALC-SCNC: 12 MMOL/L (ref 8–16)
AST SERPL-CCNC: 21 U/L (ref 10–40)
BASOPHILS # BLD AUTO: 0.06 K/UL (ref 0–0.2)
BASOPHILS NFR BLD: 0.8 % (ref 0–1.9)
BILIRUB SERPL-MCNC: 0.4 MG/DL (ref 0.1–1)
BUN SERPL-MCNC: 21 MG/DL (ref 8–23)
CALCIUM SERPL-MCNC: 10.8 MG/DL (ref 8.7–10.5)
CHLORIDE SERPL-SCNC: 102 MMOL/L (ref 95–110)
CHOLEST SERPL-MCNC: 291 MG/DL (ref 120–199)
CHOLEST/HDLC SERPL: 5.8 {RATIO} (ref 2–5)
CO2 SERPL-SCNC: 26 MMOL/L (ref 23–29)
CREAT SERPL-MCNC: 0.9 MG/DL (ref 0.5–1.4)
DIFFERENTIAL METHOD BLD: NORMAL
EOSINOPHIL # BLD AUTO: 0.2 K/UL (ref 0–0.5)
EOSINOPHIL NFR BLD: 2.5 % (ref 0–8)
ERYTHROCYTE [DISTWIDTH] IN BLOOD BY AUTOMATED COUNT: 12.5 % (ref 11.5–14.5)
EST. GFR  (NO RACE VARIABLE): >60 ML/MIN/1.73 M^2
ESTIMATED AVG GLUCOSE: 148 MG/DL (ref 68–131)
GLUCOSE SERPL-MCNC: 146 MG/DL (ref 70–110)
HBA1C MFR BLD: 6.8 % (ref 4–5.6)
HCT VFR BLD AUTO: 41.4 % (ref 37–48.5)
HDLC SERPL-MCNC: 50 MG/DL (ref 40–75)
HDLC SERPL: 17.2 % (ref 20–50)
HGB BLD-MCNC: 13.7 G/DL (ref 12–16)
IMM GRANULOCYTES # BLD AUTO: 0.02 K/UL (ref 0–0.04)
IMM GRANULOCYTES NFR BLD AUTO: 0.3 % (ref 0–0.5)
LDLC SERPL CALC-MCNC: 190.2 MG/DL (ref 63–159)
LYMPHOCYTES # BLD AUTO: 2.7 K/UL (ref 1–4.8)
LYMPHOCYTES NFR BLD: 35.6 % (ref 18–48)
MCH RBC QN AUTO: 30.4 PG (ref 27–31)
MCHC RBC AUTO-ENTMCNC: 33.1 G/DL (ref 32–36)
MCV RBC AUTO: 92 FL (ref 82–98)
MONOCYTES # BLD AUTO: 0.6 K/UL (ref 0.3–1)
MONOCYTES NFR BLD: 8.3 % (ref 4–15)
NEUTROPHILS # BLD AUTO: 3.9 K/UL (ref 1.8–7.7)
NEUTROPHILS NFR BLD: 52.5 % (ref 38–73)
NONHDLC SERPL-MCNC: 241 MG/DL
NRBC BLD-RTO: 0 /100 WBC
PLATELET # BLD AUTO: 405 K/UL (ref 150–450)
PMV BLD AUTO: 10.1 FL (ref 9.2–12.9)
POTASSIUM SERPL-SCNC: 5.2 MMOL/L (ref 3.5–5.1)
PROT SERPL-MCNC: 7.5 G/DL (ref 6–8.4)
RBC # BLD AUTO: 4.5 M/UL (ref 4–5.4)
SODIUM SERPL-SCNC: 140 MMOL/L (ref 136–145)
TRIGL SERPL-MCNC: 254 MG/DL (ref 30–150)
WBC # BLD AUTO: 7.5 K/UL (ref 3.9–12.7)

## 2024-03-01 PROCEDURE — 80053 COMPREHEN METABOLIC PANEL: CPT | Performed by: FAMILY MEDICINE

## 2024-03-01 PROCEDURE — 85025 COMPLETE CBC W/AUTO DIFF WBC: CPT | Performed by: FAMILY MEDICINE

## 2024-03-01 PROCEDURE — 80061 LIPID PANEL: CPT | Performed by: FAMILY MEDICINE

## 2024-03-01 PROCEDURE — 83036 HEMOGLOBIN GLYCOSYLATED A1C: CPT | Performed by: FAMILY MEDICINE

## 2024-03-01 PROCEDURE — 82306 VITAMIN D 25 HYDROXY: CPT | Performed by: FAMILY MEDICINE

## 2024-03-01 PROCEDURE — 36415 COLL VENOUS BLD VENIPUNCTURE: CPT | Mod: PO | Performed by: FAMILY MEDICINE

## 2024-03-04 ENCOUNTER — CLINICAL SUPPORT (OUTPATIENT)
Dept: REHABILITATION | Facility: HOSPITAL | Age: 69
End: 2024-03-04
Payer: MEDICARE

## 2024-03-04 ENCOUNTER — TELEPHONE (OUTPATIENT)
Dept: FAMILY MEDICINE | Facility: CLINIC | Age: 69
End: 2024-03-04
Payer: MEDICARE

## 2024-03-04 DIAGNOSIS — R52 PAIN: ICD-10-CM

## 2024-03-04 DIAGNOSIS — E83.52 HYPERCALCEMIA: Primary | ICD-10-CM

## 2024-03-04 DIAGNOSIS — E87.5 HYPERKALEMIA: ICD-10-CM

## 2024-03-04 DIAGNOSIS — R26.9 GAIT ABNORMALITY: ICD-10-CM

## 2024-03-04 DIAGNOSIS — R53.1 DECREASED RANGE OF MOTION WITH DECREASED STRENGTH: Primary | ICD-10-CM

## 2024-03-04 DIAGNOSIS — R68.89 DECREASED FUNCTIONAL ACTIVITY TOLERANCE: ICD-10-CM

## 2024-03-04 DIAGNOSIS — M25.60 DECREASED RANGE OF MOTION WITH DECREASED STRENGTH: Primary | ICD-10-CM

## 2024-03-04 PROCEDURE — 97110 THERAPEUTIC EXERCISES: CPT | Mod: CQ

## 2024-03-04 NOTE — PROGRESS NOTES
[facility]   Physical Therapy Treatment Note + Progress Report       Name: Rachelle Philip  [facility] Number: [id number]      Therapy Diagnosis:   Encounter Diagnoses   Name Primary?    Decreased range of motion with decreased strength Yes    Decreased functional activity tolerance     Gait abnormality     Pain         Physician: Gavin Ocampo MD    Visit Date: 3/4/2024      Physician Orders: PT Eval and Treat  Medical Diagnosis from Referral: Chronic pain of both knees  Evaluation Date: 2/5/2024  Authorization Period Expiration: 12/31/24  Plan of Care Expiration: 4/5/2024  Progress Note Due: 4/4/2024  Visit # / Visits authorized: 6/20 (+ evaluation)  FOTO: 1/3 (last performed on 2/5/2024)     Precautions: Standard, Diabetes, and HTN  PTA Visit #: 1/5     Time In: 2:25 pm  Time Out: 3:25 pm  Total Billable Time: 55 minutes   Billing reflects one on one time spent with patient. Billing reflects [place] medicaid guidelines, billing all therapy as therapeutic-exercise      Subjective     All treatment and subjective/objective communicated with patient through the aid of an . [place] # [id number]    Patient reports: minimal right knee pain today. She had more low back pain over the weekend but less today.     She was compliant with home exercise program.    Response to previous treatment: felt okay after last therapy session    Functional change: decreased walking tolerance and pain with sudden changes of direction while walking. Standing tolerance limited to about 30 minutes, increased pain with repositioning in bed. Due to back pain: upon standing, she is occasionally unable to walk     Pain: 2-3/10     Location: 4/10 left knee medical joint line and low back       Objective      Objective Measures updated at progress report or POC update only unless otherwise noted.       Range of Motion:     Knee AROM/PROM Right Left Pain/Dysfunction with Movement Right 3/4/2024 Left 3/4/2024   Knee  "Flexion (135º) 120 128 Pain on right "feels tight" 123 120   Knee Extension (0º) 0 0   0 0          Strength:     L/E MMT Right    Left Pain/Dysfunction with Movement Right 3/4/2024 Left 3/4/2024   Hip Flexion  3+/5 3+/5 +trunk shift NT NT   Hip Abduction  2+/5 2+/5 + trunk shift 4/5 4+/5   Knee Extension 4+/5 4+/5 Mild pain right knee; +trunk shift 4/5 4+/5   Knee Flexion 4+/5 4+/5 Mild pain right knee; +trunk shift 3+/5 3+/5   Hip IR 3+/5 3+/5 +trunk shift NT NT   Hip ER 3+/5 3+/5 +trunk shift NT NT   Ankle DF 4+/5 4+/5   NT NT   Ankle PF 4+/5 4+/5   NT NT           Treatment     Rachelle received the treatments listed below:     THERAPEUTIC EXERCISES to develop strength, endurance, ROM, flexibility, posture, and core stabilization for (35) minutes including: obtaining objective measurements    Intervention Performed Today    Nustep  5 minutes    Bike x 5 min   Posterior pelvic tilt   3 minutes 3 seconds hold with blood pressure cuff for feedback   Quad sets  3 minutes bilateral    Heel slide  2 x 10 bilateral    Clamshells supine  - 3 min - PINK LOOP with PPT   Hip abduction with belt   3 minutes 5 seconds hold    Hip adduction isometric with ball x 3 minutes 5 seconds hold supine    Hip extension DC  prone   Hip extension with knee flexed  DC 3 x 8 right, 2 x 8 left (cramping in left hamstring)   Tailgaters (added)      Long arc quad  x 3 x 8 3 pound bilateral    Knee flexion x 3 x 10 red band bilateral    Wedge stretch    3 x 30 seconds    Hamstring stretch  2 x 20 seconds sitting    Short arm knee joint distraction (added)   4 x 30 seconds right knee in short sit at edge of mat   shuttle x 3 x 8 3 bands   Step ups     Unilateral standing     5 x sit to stand (short term goal #6)       Rachelle participated in neuromuscular re-education activities to improve: Balance, Coordination, Proprioception, and Posture for 20 minutes. The following activities were included:      Intervention 12/12/2023  Parameters   Sit to stand " x 5x/ 3 sets  5 pound Kettle Jimenez   Heel raises  x 3 x 10 2 hand support   Toe raises x 3 x 10 2 hand support   Side steps x 1 minutes    Hip series x  x     Clamshell 10x/2 sets  10x/ 2 sets abduction   x 10 forward/backward   x5 / 2 sets circles CW/CCW  x 10 forward bicycle    Straight leg raise with PPT x 10x/ 2 sets bilateral    Bridges with PPT x 3 x 8              Plan for Next Visit: Progress as able - avoid LB extension       Patient Education and Home Exercises       Home Exercises Provided and Patient Education Provided     Education provided: (during session) minutes  PURPOSE: Patient educated on the impairments noted above and the effects of physical therapy intervention to improve overall condition and QOL.   EXERCISE: Patient was educated on all the above exercise prior/during/after for proper posture, positioning, and execution for safe performance with home exercise program.   STRENGTH: Patient educated on the importance of improved core and extremity strength in order to improve alignment of the spine and extremities with static positions and dynamic movement.     Written Home Exercises Provided: patient was instructed to continue with prior home exercise program and use bicycle instead of treadmill.  Exercises were reviewed and Rachelle was able to demonstrate them prior to the end of the session.  Rachelle demonstrated good  understanding of the education provided. See EMR under Patient Instructions for exercises provided during therapy sessions.    Assessment     Ms. Bush has consistently reported less pain in her bilateral knees for the last few visits. She is able to show improvement in her bilateral hip abduction strength. This measurement was compared to 2/5/2024. She is better able to tolerate exercises with improvement in her movement pattern and in her quality of exercise including in her core stability. She was able to be progressed today with adding more strengthening exercises. Patient reports  fatigue with sitting hamstring curls in right lower extremity. She may benefit from continuing physical therapy to address her remaining deficits.     Rachelle is progressing well towards her goals.   Patient prognosis is Good.     Patient will continue to benefit from skilled outpatient physical therapy to address the deficits listed in the problem list box on initial evaluation, provide pt/family education and to maximize patient's level of independence in the home and community environment.     Patient's spiritual, cultural and educational needs considered and pt agreeable to plan of care and goals.       Anticipated Barriers for therapy: co-morbidities, sedentary lifestyle, chronicity of condition, lack of understanding of condition, lack of support system, coping style, and social background     Goals:  Reviewed: 3/4/2024      Short Term Goals: In 4 weeks  Progress Date   1.Patient to be educated on HEP. [] Progressing  [x] MET   [] Not MET   [] Not tested  3/4/2024   2.Patient to increase right knee range of motion, in order to improve available range of motion for ADL's.  [x] Progressing  [] MET   [] Not MET  [] Not tested     3.Patient to increase bilateral LE strength by 1/2 grade, in order to improve endurance and increase ability to perform all functional activities for increased time.  [x] Progressing  [] MET   [] Not MET  [] Not tested     4.Patient to have pain less than 5/10 at worst, to improve QOL. [] Progressing  [x] MET   [] Not MET  [] Not tested  3/4/2024   5.Patient to improve score on the FOTO, to improve QOL. [x] Progressing  [] MET   [] Not MET  [] Not tested     6. Patient to score on 5 times sit to stand with long term goals made in order to decrease fall risk. [x] Progressing  [] MET   [] Not MET  [] Not tested        Long Term Goals: In 8 weeks Progress Date   1.Patient to improve score on the FOTO predicted score or better, to improve QOL. [x] Progressing  [] MET   [] Not MET  [] Not tested      2. Patient to increase bilateral LE strength to 4+/5 or greater, in order to improve endurance and increase ability to perform all functional activities for increased time. [x] Progressing  [] MET   [] Not MET  [] Not tested     3. Patient to have decreased pain to 2/10 at worst, to improve QOL. [x] Progressing  [] MET   [] Not MET  [] Not tested     4. Patient to have long term goals for score on 5 times sit to stand , in order to improve endurance and decrease fall risk. [x] Progressing  [] MET   [] Not MET  [] Not tested     5. Patient to perform daily activities including walking on treadmill without increased symptoms. [x] Progressing  [] MET   [] Not MET  [] Not tested                Plan     Monitor response to today's treatment session and progress with Physical Therapy plan of care as indicated.    Plan to evaluate LB on 3/6/24.      Guanaco Reyes, PTA

## 2024-03-06 ENCOUNTER — CLINICAL SUPPORT (OUTPATIENT)
Dept: REHABILITATION | Facility: HOSPITAL | Age: 69
End: 2024-03-06
Payer: MEDICARE

## 2024-03-06 DIAGNOSIS — R53.1 DECREASED RANGE OF MOTION WITH DECREASED STRENGTH: ICD-10-CM

## 2024-03-06 DIAGNOSIS — R68.89 DECREASED FUNCTIONAL ACTIVITY TOLERANCE: ICD-10-CM

## 2024-03-06 DIAGNOSIS — G89.29 CHRONIC NECK PAIN: ICD-10-CM

## 2024-03-06 DIAGNOSIS — M54.2 CHRONIC NECK PAIN: ICD-10-CM

## 2024-03-06 DIAGNOSIS — M79.18 MYOFASCIAL PAIN SYNDROME, CERVICAL: ICD-10-CM

## 2024-03-06 DIAGNOSIS — R26.9 GAIT ABNORMALITY: Primary | ICD-10-CM

## 2024-03-06 DIAGNOSIS — M54.16 LUMBAR RADICULITIS: ICD-10-CM

## 2024-03-06 DIAGNOSIS — M25.60 DECREASED RANGE OF MOTION WITH DECREASED STRENGTH: ICD-10-CM

## 2024-03-06 DIAGNOSIS — R52 PAIN: ICD-10-CM

## 2024-03-06 PROCEDURE — 97162 PT EVAL MOD COMPLEX 30 MIN: CPT
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
## 2024-03-07 ENCOUNTER — LAB VISIT (OUTPATIENT)
Dept: LAB | Facility: HOSPITAL | Age: 69
End: 2024-03-07
Attending: FAMILY MEDICINE
Payer: MEDICARE

## 2024-03-07 ENCOUNTER — OFFICE VISIT (OUTPATIENT)
Dept: FAMILY MEDICINE | Facility: CLINIC | Age: 69
End: 2024-03-07
Payer: MEDICARE

## 2024-03-07 VITALS
WEIGHT: 157.19 LBS | TEMPERATURE: 97 F | OXYGEN SATURATION: 95 % | BODY MASS INDEX: 30.86 KG/M2 | DIASTOLIC BLOOD PRESSURE: 80 MMHG | HEIGHT: 60 IN | SYSTOLIC BLOOD PRESSURE: 130 MMHG | HEART RATE: 81 BPM

## 2024-03-07 DIAGNOSIS — E78.5 DYSLIPIDEMIA ASSOCIATED WITH TYPE 2 DIABETES MELLITUS: ICD-10-CM

## 2024-03-07 DIAGNOSIS — E11.69 DYSLIPIDEMIA ASSOCIATED WITH TYPE 2 DIABETES MELLITUS: ICD-10-CM

## 2024-03-07 DIAGNOSIS — E83.52 HYPERCALCEMIA: ICD-10-CM

## 2024-03-07 DIAGNOSIS — E11.9 TYPE 2 DIABETES MELLITUS WITHOUT COMPLICATION, WITH LONG-TERM CURRENT USE OF INSULIN: Primary | ICD-10-CM

## 2024-03-07 DIAGNOSIS — K21.9 GASTROESOPHAGEAL REFLUX DISEASE WITHOUT ESOPHAGITIS: ICD-10-CM

## 2024-03-07 DIAGNOSIS — Z79.4 TYPE 2 DIABETES MELLITUS WITHOUT COMPLICATION, WITH LONG-TERM CURRENT USE OF INSULIN: Primary | ICD-10-CM

## 2024-03-07 DIAGNOSIS — E87.5 HYPERKALEMIA: ICD-10-CM

## 2024-03-07 LAB
ALBUMIN SERPL BCP-MCNC: 4 G/DL (ref 3.5–5.2)
ALP SERPL-CCNC: 73 U/L (ref 55–135)
ALT SERPL W/O P-5'-P-CCNC: 18 U/L (ref 10–44)
ANION GAP SERPL CALC-SCNC: 9 MMOL/L (ref 8–16)
AST SERPL-CCNC: 19 U/L (ref 10–40)
BILIRUB SERPL-MCNC: 0.3 MG/DL (ref 0.1–1)
BUN SERPL-MCNC: 21 MG/DL (ref 8–23)
CALCIUM SERPL-MCNC: 10.4 MG/DL (ref 8.7–10.5)
CHLORIDE SERPL-SCNC: 105 MMOL/L (ref 95–110)
CO2 SERPL-SCNC: 26 MMOL/L (ref 23–29)
CREAT SERPL-MCNC: 1 MG/DL (ref 0.5–1.4)
EST. GFR  (NO RACE VARIABLE): >60 ML/MIN/1.73 M^2
GLUCOSE SERPL-MCNC: 137 MG/DL (ref 70–110)
POTASSIUM SERPL-SCNC: 4.5 MMOL/L (ref 3.5–5.1)
PROT SERPL-MCNC: 7.3 G/DL (ref 6–8.4)
SODIUM SERPL-SCNC: 140 MMOL/L (ref 136–145)

## 2024-03-07 PROCEDURE — 36415 COLL VENOUS BLD VENIPUNCTURE: CPT | Mod: PO | Performed by: FAMILY MEDICINE

## 2024-03-07 PROCEDURE — 80053 COMPREHEN METABOLIC PANEL: CPT | Performed by: FAMILY MEDICINE

## 2024-03-07 PROCEDURE — 99999 PR PBB SHADOW E&M-EST. PATIENT-LVL IV: CPT | Mod: PBBFAC,,, | Performed by: NURSE PRACTITIONER

## 2024-03-07 PROCEDURE — 99214 OFFICE O/P EST MOD 30 MIN: CPT | Mod: PBBFAC,PO | Performed by: NURSE PRACTITIONER

## 2024-03-07 PROCEDURE — 99214 OFFICE O/P EST MOD 30 MIN: CPT | Mod: S$PBB,,, | Performed by: NURSE PRACTITIONER

## 2024-03-07 RX ORDER — ATORVASTATIN CALCIUM 80 MG/1
80 TABLET, FILM COATED ORAL DAILY
Qty: 90 TABLET | Refills: 3 | Status: SHIPPED | OUTPATIENT
Start: 2024-03-07 | End: 2025-03-07

## 2024-03-07 RX ORDER — OMEPRAZOLE 40 MG/1
40 CAPSULE, DELAYED RELEASE ORAL DAILY
Qty: 90 CAPSULE | Refills: 3 | Status: SHIPPED | OUTPATIENT
Start: 2024-03-07 | End: 2025-03-07

## 2024-03-07 NOTE — PROGRESS NOTES
"Subjective:       Patient ID: aRchelle Philip is a 68 y.o. female.    Chief Complaint: Follow-up (labs)  Pt reports to clinic to discuss recent lab results.  Elevated calcium and potassium.  Denies palpitations, muscle cramps and paresthesia.  Reports daily green smoothie.  Takes daily OTC vitamin but has recently stopped.  Cholesterol elevated.  Reports "eat lot of tacos", but diet is rich in vegetables, avoid refined sugar and decreased carbs, exercises several days a week.  Limited fast food intake.  FMH: HLD;       Cholesterol 120 - 199 mg/dL 291 High  214 High  CM   Comment: The National Cholesterol Education Program (NCEP) has set the  following guidelines (reference ranges) for Cholesterol:  Optimal.....................<200 mg/dL  Borderline High.............200-239 mg/dL  High........................> or = 240 mg/dL   Triglycerides 30 - 150 mg/dL 254 High  181 High  CM   Comment: The National Cholesterol Education Program (NCEP) has set the  following guidelines (reference values) for triglycerides:  Normal......................<150 mg/dL  Borderline High.............150-199 mg/dL  High........................200-499 mg/dL   HDL 40 - 75 mg/dL 50 53 CM   Comment: The National Cholesterol Education Program (NCEP) has set the  following guidelines (reference values) for HDL Cholesterol:  Low...............<40 mg/dL  Optimal...........>60 mg/dL   LDL Cholesterol 63.0 - 159.0 mg/dL 190.2 High  124.8 CM   Comment: The National Cholesterol Education Program (NCEP) has set the  following guidelines (reference values) for LDL Cholesterol:  Optimal.......................<130 mg/dL  Borderline High...............130-159 mg/dL  High..........................160-189 mg/dL  Very High.....................>190 mg/dL   HDL/Cholesterol Ratio 20.0 - 50.0 % 17.2 Low  24.8   Total Cholesterol/HDL Ratio 2.0 - 5.0 5.8 High  4.0   Non-HDL Cholesterol mg/dL 241 161 CM   Comment: Risk category and Non-HDL cholesterol goals:  Coronary " "heart disease (CHD)or equivalent (10-year risk of CHD >20%):  Non-HDL cholesterol goal     <130 mg/dL  Two or more CHD risk factors and 10-year risk of CHD <= 20%:  Non-HDL cholesterol goal     <160 mg/dL  0 to 1 CHD risk factor:  Non-HDL cholesterol goal     <190 mg/dL     Past Medical History:   Diagnosis Date    Diabetes mellitus     Hypertension       Current Outpatient Medications on File Prior to Visit   Medication Sig Dispense Refill    aspirin (ECOTRIN) 81 MG EC tablet Take 81 mg by mouth once daily.      ergocalciferol (ERGOCALCIFEROL) 50,000 unit Cap ergocalciferol (vitamin D2) 1,250 mcg (50,000 unit) capsule   TAKE 1 CAPSULE BY MOUTH ONCE A WEEK      gabapentin (NEURONTIN) 100 MG capsule Take 1 capsule (100 mg total) by mouth 3 (three) times daily. 90 capsule 11    hydrOXYzine pamoate (VISTARIL) 50 MG Cap Take 50 mg by mouth every evening.      insulin aspart protamine-insulin aspart (NOVOLOG MIX 70-30FLEXPEN U-100) 100 unit/mL (70-30) InPn pen 45 u  sq BID. Dispense  6  boxes 6 each 0    losartan (COZAAR) 100 MG tablet TAKE 1 TABLET BY MOUTH ONCE DAILY 90 tablet 1    metFORMIN (GLUCOPHAGE) 1000 MG tablet Take 1 tablet (1,000 mg total) by mouth daily with breakfast. 90 tablet 0    nabumetone (RELAFEN) 500 MG tablet Take 1 tablet (500 mg total) by mouth 2 (two) times daily. 30 tablet 0    pen needle, diabetic (BD BRANT 2ND GEN PEN NEEDLE) 32 gauge x 5/32" Ndle BD Brant 2nd Gen Pen Needle 32 gauge x 5/32"  USE TO INJECT INSULIN TWICE DAILY 180 each 1    TRUE METRIX GLUCOSE TEST STRIP Strp Check glucose  strip 1    [DISCONTINUED] atorvastatin (LIPITOR) 40 MG tablet TAKE 1 TABLET BY MOUTH ONCE DAILY AT BEDTIME. 90 tablet 1     No current facility-administered medications on file prior to visit.      The 10-year ASCVD risk score (Nathalie ISAAC, et al., 2019) is: 19.4%    Values used to calculate the score:      Age: 68 years      Sex: Female      Is Non- : No      Diabetic: Yes      " Tobacco smoker: No      Systolic Blood Pressure: 130 mmHg      Is BP treated: Yes      HDL Cholesterol: 50 mg/dL      Total Cholesterol: 291 mg/dL   Follow-up      Review of Systems   Constitutional:  Negative for activity change, appetite change and unexpected weight change.   HENT: Negative.     Respiratory: Negative.     Cardiovascular: Negative.    Gastrointestinal: Negative.    Genitourinary: Negative.    Neurological: Negative.        Objective:      Physical Exam  Vitals reviewed.   HENT:      Head: Normocephalic.      Right Ear: External ear normal.      Left Ear: External ear normal.   Eyes:      Extraocular Movements: Extraocular movements intact.   Cardiovascular:      Rate and Rhythm: Normal rate.      Heart sounds: Normal heart sounds.   Pulmonary:      Effort: Pulmonary effort is normal. No respiratory distress.   Skin:     Coloration: Skin is not jaundiced.   Neurological:      Mental Status: She is alert and oriented to person, place, and time.   Psychiatric:         Behavior: Behavior normal.         Assessment:       1. Type 2 diabetes mellitus without complication, with long-term current use of insulin    2. Dyslipidemia associated with type 2 diabetes mellitus    3. Gastroesophageal reflux disease without esophagitis        Plan:   Type 2 diabetes mellitus without complication, with long-term current use of insulin  -     Ambulatory referral/consult to Optometry; Future; Expected date: 03/14/2024    Dyslipidemia associated with type 2 diabetes mellitus  -     atorvastatin (LIPITOR) 80 MG tablet; Take 1 tablet (80 mg total) by mouth once daily.  Dispense: 90 tablet; Refill: 3    Gastroesophageal reflux disease without esophagitis  -     omeprazole (PRILOSEC) 40 MG capsule; Take 1 capsule (40 mg total) by mouth once daily.  Dispense: 90 capsule; Refill: 3    Continue dietary modifications with exercise.  Lipitor increased to 80mg, willrepeat lipid panel in 1 year.  Chronic care appt with   in 6 months; repeat CMP today to eval Calcium and potassium  No follow-ups on file.

## 2024-03-08 ENCOUNTER — PATIENT MESSAGE (OUTPATIENT)
Dept: FAMILY MEDICINE | Facility: CLINIC | Age: 69
End: 2024-03-08
Payer: MEDICARE

## 2024-03-11 ENCOUNTER — CLINICAL SUPPORT (OUTPATIENT)
Dept: REHABILITATION | Facility: HOSPITAL | Age: 69
End: 2024-03-11
Payer: MEDICARE

## 2024-03-11 DIAGNOSIS — M25.60 DECREASED RANGE OF MOTION WITH DECREASED STRENGTH: ICD-10-CM

## 2024-03-11 DIAGNOSIS — R26.9 GAIT ABNORMALITY: Primary | ICD-10-CM

## 2024-03-11 DIAGNOSIS — R52 PAIN: ICD-10-CM

## 2024-03-11 DIAGNOSIS — R53.1 DECREASED RANGE OF MOTION WITH DECREASED STRENGTH: ICD-10-CM

## 2024-03-11 DIAGNOSIS — R68.89 DECREASED FUNCTIONAL ACTIVITY TOLERANCE: ICD-10-CM

## 2024-03-11 PROCEDURE — 97110 THERAPEUTIC EXERCISES: CPT | Mod: CQ

## 2024-03-11 NOTE — PROGRESS NOTES
[facility]   Physical Therapy Treatment Note        Name: Rachelle Philip  [facility] Number: [id number]    Therapy Diagnosis:   Encounter Diagnoses   Name Primary?    Gait abnormality Yes    Pain     Decreased range of motion with decreased strength     Decreased functional activity tolerance      Physician: Gavin Ocampo MD    Visit Date: 3/11/2024      Physician Orders: PT Eval and Treat  Medical Diagnosis from Referral: Lumbar radiculitis; Chronic neck pain; Myofascial pain syndrome, cervical  Evaluation Date: 3/6/2024  Authorization Period Expiration: 12/31/24  Plan of Care Expiration: 5/5/2024  Progress Note Due: 4/5/2024  Visit # / Visits authorized: 1/1 (+7)  FOTO: 1/3 (last performed on 3/6/2024)       Precautions: Standard, Diabetes, spondylolisthesis and HTN   PTA Visit #: 1/5     Time In: 1240  Time Out: 1530  Total Billable Time: 55 minutes (Billing reflects 1 on 1 treatment time spent with patient)    Subjective     Patient reports: intermittent pain in her right cervical spine to her knee. She is going to the gym where she reports she is riding the exercise bike for 30 minutes and performing bridges 3 x 10     She was compliant with home exercise program.    Response to previous treatment: feeling okay after evaluation    Functional change: not performing home chores, and not working at this time    Pain: 4/10     Location: right shoulder      this session: Neyda # [id number]    Objective      Objective Measures updated at progress report or POC update only unless otherwise noted.       Treatment     Rachelle received the treatments listed below:     MANUAL THERAPY TECHNIQUES were applied for (8) minutes, including:    Manual Intervention Performed Today    Soft Tissue Mobilization [x] right teres major and minor , subscapularis , deltoid    Joint Mobilizations []     []     []    Functional Dry Needling  []      Plan for Next Visit: Continue as needed         THERAPEUTIC  EXERCISES to develop strength, endurance, ROM, flexibility, posture, and core stabilization for (47) minutes including:    Intervention Performed Today    nustep x 8 minutes         Cervical rotation  x X 10 bilateral supine 1 pillow folded in 1/2    Cervical retraction  x X 15 sitting   Bilateral shoulder external rotation      Scapula retraction x 2 x 10 sitting   Scapula depression  x 2 x 10 supine   Scapula protraction x 2 x 10 supine    Elbow press  x 2 x 10 3 seconds hold   Upper trapezius stretch                         Posterior pelvic tilt  x 1.5 minutes 3 seconds hold   Single knee to chest      Lower trunk rotation      Piriformis stretch                           Plan for Next Visit:          Patient Education and Home Exercises       Home Exercises Provided and Patient Education Provided     Education provided: (during session) minutes  PURPOSE: Patient educated on the impairments noted above and the effects of physical therapy intervention to improve overall condition and QOL.   EXERCISE: Patient was educated on all the above exercise prior/during/after for proper posture, positioning, and execution for safe performance with home exercise program.   STRENGTH: Patient educated on the importance of improved core and extremity strength in order to improve alignment of the spine and extremities with static positions and dynamic movement.     Written Home Exercises Provided: yes.  Exercises were reviewed and Rachelle was able to demonstrate them prior to the end of the session.  Rachelle demonstrated good  understanding of the education provided. See EMR under Patient Instructions for exercises provided during therapy sessions.    Assessment     Patient was unable to perform posterior pelvic tilt for entire time due to cramping in her right lower extremity. She reports less pain in her right shoulder after manual therapy. She requires additional cues for scapula depression and to maintain scapula depression with  serratus anterior press.     Rachelle is progressing well towards her goals.   Patient prognosis is Fair.     Patient will continue to benefit from skilled outpatient physical therapy to address the deficits listed in the problem list box on initial evaluation, provide pt/family education and to maximize patient's level of independence in the home and community environment.     Patient's spiritual, cultural and educational needs considered and pt agreeable to plan of care and goals.       Anticipated Barriers for therapy: co-morbidities, chronicity of condition, lack of understanding of condition, adherence to treatment plan, financial limitations, lack of support system, and coping style        Goals:  Reviewed: 3/6/2024       Short Term Goals: In 4 weeks  Progress Date   1.Patient to be educated on HEP. [x] Progressing  [] MET   [] Not MET   [] Not tested     2.Patient to increase cervical and trunk range of motion and right knee range of motion, in order to improve available range of motion for ADL's.  [x] Progressing  [] MET   [] Not MET  [] Not tested     3.Patient to increase bilateral upper extremities/lower extremities  strength by 1/2 grade, in order to improve endurance and increase ability to perform all functional activities for increased time.  [] Progressing  [] MET   [] Not MET  [] Not tested     4.Patient to have pain less than 4/10 at worst, to improve QOL. [x] Progressing  [] MET   [] Not MET  [] Not tested     5.Patient to improve score on the FOTO, to improve QOL. [x] Progressing  [] MET   [] Not MET  [] Not tested     6. Patient to score on 5 times sit to stand with long term goals made in order to decrease fall risk. [x] Progressing  [] MET   [] Not MET  [] Not tested        Long Term Goals: In 8 weeks Progress Date   1.Patient to improve score on the FOTO predicted score or better, to improve QOL. [x] Progressing  [] MET   [] Not MET  [] Not tested     2. Patient to increase bilateral upper  extremities / lower extremities  strength to 4+/5 or greater, in order to improve endurance and increase ability to perform all functional activities for increased time. [x] Progressing  [] MET   [] Not MET  [] Not tested     3. Patient to have decreased pain to 1/10 at worst, to improve QOL. [x] Progressing  [] MET   [] Not MET  [] Not tested     4. Patient to have long term goals for score on 5 times sit to stand , in order to improve endurance and decrease fall risk. [x] Progressing  [] MET   [] Not MET  [] Not tested     5. Patient to perform daily activities including walking on treadmill without increased symptoms. [x] Progressing  [] MET   [] Not MET  [] Not tested          Plan     Continue Plan of Care (POC) and progress per patient tolerance. See treatment section for details on planned progressions next session.      Guanaco Reyes, PTA

## 2024-03-12 ENCOUNTER — DOCUMENTATION ONLY (OUTPATIENT)
Dept: REHABILITATION | Facility: HOSPITAL | Age: 69
End: 2024-03-12
Payer: MEDICARE

## 2024-03-13 ENCOUNTER — CLINICAL SUPPORT (OUTPATIENT)
Dept: REHABILITATION | Facility: HOSPITAL | Age: 69
End: 2024-03-13
Payer: MEDICARE

## 2024-03-13 ENCOUNTER — TELEPHONE (OUTPATIENT)
Dept: FAMILY MEDICINE | Facility: CLINIC | Age: 69
End: 2024-03-13
Payer: MEDICARE

## 2024-03-13 DIAGNOSIS — R52 PAIN: ICD-10-CM

## 2024-03-13 DIAGNOSIS — R53.1 DECREASED RANGE OF MOTION WITH DECREASED STRENGTH: ICD-10-CM

## 2024-03-13 DIAGNOSIS — R26.9 GAIT ABNORMALITY: Primary | ICD-10-CM

## 2024-03-13 DIAGNOSIS — Z12.11 COLON CANCER SCREENING: Primary | ICD-10-CM

## 2024-03-13 DIAGNOSIS — R68.89 DECREASED FUNCTIONAL ACTIVITY TOLERANCE: ICD-10-CM

## 2024-03-13 DIAGNOSIS — M25.60 DECREASED RANGE OF MOTION WITH DECREASED STRENGTH: ICD-10-CM

## 2024-03-13 PROCEDURE — 97140 MANUAL THERAPY 1/> REGIONS: CPT

## 2024-03-13 PROCEDURE — 97110 THERAPEUTIC EXERCISES: CPT

## 2024-03-13 PROCEDURE — 97112 NEUROMUSCULAR REEDUCATION: CPT

## 2024-03-13 NOTE — PROGRESS NOTES
[facility]   Physical Therapy Treatment Note        Name: Rachelle Philip  [facility] Number: [id number]    Therapy Diagnosis:   Encounter Diagnoses   Name Primary?    Gait abnormality Yes    Pain     Decreased range of motion with decreased strength     Decreased functional activity tolerance      Physician: Gavin Ocampo MD    Visit Date: 3/13/2024      Physician Orders: PT Eval and Treat  Medical Diagnosis from Referral: Lumbar radiculitis; Chronic neck pain; Myofascial pain syndrome, cervical  Evaluation Date: 3/6/2024  Authorization Period Expiration: 12/31/24  Plan of Care Expiration: 5/5/2024  Progress Note Due: 4/5/2024  Visit # / Visits authorized: 8/20 (+2)  FOTO: 1/3 (last performed on 3/6/2024)       Precautions: Standard, Diabetes, spondylolisthesis and HTN   PTA Visit #: 1/5     Time In: 1:00 pm  Time Out: 2:00 pm  Total Billable Time: 53 minutes (Billing reflects 1 on 1 treatment time spent with patient)    Subjective     All communication through use of  services.    Patient reports: she reports pain so bad yesterday that she almost went to the hospital - after discussion she did do yoga and she feels like she may have done too much LB extension.  Reports prior to that she has been feeling better with all the exercises. But was 10/10 yesterday after yoga. Extensive discussion to avoid LB extension - with demonstration of avoidance movements. Encouraged Pilates based program versus Yoga - no lying on stomach.    She was compliant with home exercise program.    Response to previous treatment: feeling okay after evaluation    Functional change: not performing home chores, and not working at this time    Pain: 4/10     Location: right shoulder/LB     All communication through use of  services.    Objective      Objective Measures updated at progress report or POC update only unless otherwise noted.       Treatment     Rachelle received the treatments listed below:      MANUAL THERAPY TECHNIQUES were applied for (8) minutes, including:    Manual Intervention Performed Today    Soft Tissue Mobilization [x] right teres major and minor , subscapularis , deltoid    Joint Mobilizations []     []     []    Functional Dry Needling  []      Plan for Next Visit: Continue as needed         THERAPEUTIC EXERCISES to develop strength, endurance, ROM, flexibility, posture, and core stabilization for (30) minutes including:    Intervention Performed Today    Nustep x 8 minutes         Scapula retraction - 2 x 10 sitting   Scapula depression  x 2 x 10 supine 5 sec hold   Scapula protraction - 2 x 10 supine    Elbow press/shoulder extension supine x 2 x 10;  3 seconds hold   Upper trapezius stretch     Single knee to chest  x 5x/ 5sec   Lower trunk rotation  x  3 min   Piriformis stretch x 3x 45 sec     Rachelle participated in neuromuscular re-education activities to improve: Balance, Coordination, Proprioception, and Posture for 15 minutes. The following activities were included:      Intervention 12/12/2023  Parameters   Supine clamshells  x 3 min red band    Posterior pelvic tilt  x 3 minutes 5 seconds hold   Bilateral shoulder external rotation  x RED band  sitting 10x/ 2   Cervical retraction  x x 10 sitting   Cervical rotation x x 10 bilateral supine 1 pillow    Cervical rotation sitting x 10x   PPT with march     Isometric hip ADD                 Plan for Next Visit: Progress as indicated         Patient Education and Home Exercises       Home Exercises Provided and Patient Education Provided     Education provided: (during session) minutes  PURPOSE: Patient educated on the impairments noted above and the effects of physical therapy intervention to improve overall condition and QOL.   EXERCISE: Patient was educated on all the above exercise prior/during/after for proper posture, positioning, and execution for safe performance with home exercise program.   STRENGTH: Patient educated on the  importance of improved core and extremity strength in order to improve alignment of the spine and extremities with static positions and dynamic movement.     Written Home Exercises Provided: patient instructed to continue with prior issued home exercise program.  Exercises were reviewed and Rachelle was able to demonstrate them prior to the end of the session.  Rachelle demonstrated good  understanding of the education provided. See EMR under Patient Instructions for exercises provided during therapy sessions.    Assessment     Patient tolerated all treatment well and was able to progress with program as noted. Cues during treatment session for technique and to avoid substitution. Encouraged patient to avoid LB extension and perform home exercise program.    Rachelle is progressing well towards her goals.   Patient prognosis is Fair.     Patient will continue to benefit from skilled outpatient physical therapy to address the deficits listed in the problem list box on initial evaluation, provide pt/family education and to maximize patient's level of independence in the home and community environment.     Patient's spiritual, cultural and educational needs considered and pt agreeable to plan of care and goals.       Anticipated Barriers for therapy: co-morbidities, chronicity of condition, lack of understanding of condition, adherence to treatment plan, financial limitations, lack of support system, and coping style        Goals:  Reviewed: 3/6/2024       Short Term Goals: In 4 weeks  Progress Date   1.Patient to be educated on HEP. [x] Progressing  [] MET   [] Not MET   [] Not tested     2.Patient to increase cervical and trunk range of motion and right knee range of motion, in order to improve available range of motion for ADL's.  [x] Progressing  [] MET   [] Not MET  [] Not tested     3.Patient to increase bilateral upper extremities/lower extremities  strength by 1/2 grade, in order to improve endurance and increase ability  to perform all functional activities for increased time.  [] Progressing  [] MET   [] Not MET  [] Not tested     4.Patient to have pain less than 4/10 at worst, to improve QOL. [x] Progressing  [] MET   [] Not MET  [] Not tested     5.Patient to improve score on the FOTO, to improve QOL. [x] Progressing  [] MET   [] Not MET  [] Not tested     6. Patient to score on 5 times sit to stand with long term goals made in order to decrease fall risk. [x] Progressing  [] MET   [] Not MET  [] Not tested        Long Term Goals: In 8 weeks Progress Date   1.Patient to improve score on the FOTO predicted score or better, to improve QOL. [x] Progressing  [] MET   [] Not MET  [] Not tested     2. Patient to increase bilateral upper extremities / lower extremities  strength to 4+/5 or greater, in order to improve endurance and increase ability to perform all functional activities for increased time. [x] Progressing  [] MET   [] Not MET  [] Not tested     3. Patient to have decreased pain to 1/10 at worst, to improve QOL. [x] Progressing  [] MET   [] Not MET  [] Not tested     4. Patient to have long term goals for score on 5 times sit to stand , in order to improve endurance and decrease fall risk. [x] Progressing  [] MET   [] Not MET  [] Not tested     5. Patient to perform daily activities including walking on treadmill without increased symptoms. [x] Progressing  [] MET   [] Not MET  [] Not tested          Plan     Continue Plan of Care (POC) and progress per patient tolerance. See treatment section for details on planned progressions next session.      Geni Edwards, PT

## 2024-03-13 NOTE — TELEPHONE ENCOUNTER
----- Message from Kateryna Suh MA sent at 3/13/2024 11:16 AM CDT -----  Regarding: fitkit collection  Hello, I received a message from the lab:    Specimen  container  received in lab  empty. Please  recollect.    Please mail the patient a new fitkit.    Thanks  Kateryna Suh  Panel Care Coordinator   [facility]

## 2024-03-13 NOTE — TELEPHONE ENCOUNTER
----- Message from Kateryna Suh MA sent at 3/13/2024 11:16 AM CDT -----  Regarding: fitkit collection  Hello, I received a message from the lab:    Specimen  container  received in lab  empty. Please  recollect.    Please mail the patient a new fitkit.    Thanks  Kateryna Suh  Panel Care Coordinator   [facility]          
Please see below . Order in   
Detail Level: Zone
Render Risk Assessment In Note?: no
Comment: Patient with fantastic improvement on dupixent.  Small flare today.  Patient states pruritus has improved. Uses topicals and typically will make the flare go away quickly.  Denies photophobia or conjunctivitis. Will continue dupixent and topicals

## 2024-03-14 ENCOUNTER — TELEPHONE (OUTPATIENT)
Dept: FAMILY MEDICINE | Facility: CLINIC | Age: 69
End: 2024-03-14
Payer: MEDICARE

## 2024-03-14 NOTE — TELEPHONE ENCOUNTER
----- Message from Deborah Valdez CMA sent at 3/14/2024  7:40 AM CDT -----  Regarding: FW: fitkit collection  Please make sure patient comes by and picks up a FITKIT a new order will need to be placed. You can does this with [facility]  ----- Message -----  From: Skylar Tsang LPN  Sent: 3/13/2024   1:34 PM CDT  To: Deborah Valdez CMA  Subject: RE: fitkit collection                            Hi Deborah, unfortunately, we don't mail Fitkits anymore to the patients. All of the clinics are supposed to have them to be given to the patients.    ----- Message -----  From: Deborah Valdez CMA  Sent: 3/13/2024  11:39 AM CDT  To: Skylar Tsang LPN  Subject: FW: fitkit collection                            Can you take care of this for Dr. Pires?  ----- Message -----  From: Kateryna Suh MA  Sent: 3/13/2024  11:23 AM CDT  To: Faina Peacock MD; #  Subject: fitkit collection                                Hello, I received a message from the lab:    Specimen  container  received in lab  empty. Please  recollect.    Please mail the patient a new fitkit.    Thanks  Kateryna Suh  Panel Care Coordinator   [facility]

## 2024-03-15 RX ORDER — LOSARTAN POTASSIUM 100 MG/1
TABLET ORAL
Qty: 90 TABLET | Refills: 1 | Status: SHIPPED | OUTPATIENT
Start: 2024-03-15

## 2024-03-15 NOTE — TELEPHONE ENCOUNTER
No care due was identified.  [facility] Embedded Care Due Messages. Reference number: [id number].   3/15/2024 9:50:46 AM CDT

## 2024-03-18 ENCOUNTER — TELEPHONE (OUTPATIENT)
Dept: PAIN MEDICINE | Facility: CLINIC | Age: 69
End: 2024-03-18
Payer: MEDICARE

## 2024-03-18 ENCOUNTER — CLINICAL SUPPORT (OUTPATIENT)
Dept: REHABILITATION | Facility: HOSPITAL | Age: 69
End: 2024-03-18
Payer: MEDICARE

## 2024-03-18 DIAGNOSIS — R52 PAIN: ICD-10-CM

## 2024-03-18 DIAGNOSIS — R68.89 DECREASED FUNCTIONAL ACTIVITY TOLERANCE: ICD-10-CM

## 2024-03-18 DIAGNOSIS — R53.1 DECREASED RANGE OF MOTION WITH DECREASED STRENGTH: ICD-10-CM

## 2024-03-18 DIAGNOSIS — R26.9 GAIT ABNORMALITY: Primary | ICD-10-CM

## 2024-03-18 DIAGNOSIS — M25.60 DECREASED RANGE OF MOTION WITH DECREASED STRENGTH: ICD-10-CM

## 2024-03-18 PROCEDURE — 97110 THERAPEUTIC EXERCISES: CPT | Mod: CQ

## 2024-03-18 NOTE — PROGRESS NOTES
[facility]   Physical Therapy Treatment Note        Name: Rachelle Philip  Clinic Number: [id number]    Therapy Diagnosis:   Encounter Diagnoses   Name Primary?    Gait abnormality Yes    Pain     Decreased range of motion with decreased strength     Decreased functional activity tolerance      Physician: Gavin Ocampo MD    Visit Date: 3/18/2024      Physician Orders: PT Eval and Treat  Medical Diagnosis from Referral: Lumbar radiculitis; Chronic neck pain; Myofascial pain syndrome, cervical  Evaluation Date: 3/6/2024  Authorization Period Expiration: 12/31/24  Plan of Care Expiration: 5/5/2024  Progress Note Due: 4/5/2024  Visit # / Visits authorized: 9/20 (+2)  FOTO: 1/3 (last performed on 3/6/2024)       Precautions: Standard, Diabetes, spondylolisthesis and HTN   PTA Visit #: 1/5     Time In: 1:30 pm  Time Out: 2:30 pm  Total Billable Time: 60 minutes (Billing reflects 1 on 1 treatment time spent with patient)    Subjective     All communication through use of  services. Bobby #[id number], Gamaliel #[id number]    Patient reports: increased pain yesterday that she was unable to sleep last night. Did not go to the gym to perform yoga but did walk in community for about 1 hour and then went to store. Her pain was so bad that she had to return home.     She was compliant with home exercise program.    Response to previous treatment: feeling okay after evaluation    Functional change: not performing home chores, and not working at this time    Pain: 9/10     Location: right shoulder/Low Back     Objective      Objective Measures updated at progress report or POC update only unless otherwise noted.       Treatment     Rachelle received the treatments listed below:     MANUAL THERAPY TECHNIQUES were applied for (0) minutes, including:    Manual Intervention Performed Today    Soft Tissue Mobilization [] right teres major and minor , subscapularis , deltoid    Joint Mobilizations []     []      []    Functional Dry Needling  []      Plan for Next Visit: Continue as needed         THERAPEUTIC EXERCISES to develop strength, endurance, ROM, flexibility, posture, and core stabilization for (40) minutes including:    Intervention Performed Today    Nustep  8 minutes    PASSIVE RANGE OF MOTION right hip (added)  x 4 minutes    Scapula retraction - 2 x 10 sitting   Scapula depression  x 2 x 10 supine 5 sec hold   Scapula protraction - 2 x 10 supine    Elbow press/shoulder extension supine  2 x 10;  3 seconds hold   Upper trapezius stretch     Single knee to chest  x 5 x 5 seconds hold bilateral    Lower trunk rotation  x 3 minutes    Piriformis stretch  3 x 45 seconds      Rachelle participated in neuromuscular re-education activities to improve: Balance, Coordination, Proprioception, and Posture for 10 minutes. The following activities were included:      Intervention 12/12/2023  Parameters   Supine clamshells   3 min red band    Posterior pelvic tilt  x 3 minutes 5 seconds hold   Bilateral shoulder external rotation   RED band  sitting 10x/ 2   Cervical retraction  x x 3 minutes supine 1 pillow with gentle effort   Cervical rotation x x 10 bilateral supine 1 pillow    Cervical rotation sitting  10x   PPT with march     Isometric hip ADD               The following Modalities were applied for (10) minutes after being cleared for all contraindications with the following parameters    Modality Location Details   Moist Hot Pack  Right lateral hip Prone in neutral extension       Plan for Next Visit: Progress as indicated         Patient Education and Home Exercises       Home Exercises Provided and Patient Education Provided     Education provided: (during session) minutes  PURPOSE: Patient educated on the impairments noted above and the effects of physical therapy intervention to improve overall condition and QOL.   EXERCISE: Patient was educated on all the above exercise prior/during/after for proper posture,  positioning, and execution for safe performance with home exercise program.   STRENGTH: Patient educated on the importance of improved core and extremity strength in order to improve alignment of the spine and extremities with static positions and dynamic movement.   3/18/2024: educated that the activity that was performed yesterday may have been too much activity without a rest resulting in additional pain.     Written Home Exercises Provided: patient instructed to continue with prior issued home exercise program.  Exercises were reviewed and Rachelle was able to demonstrate them prior to the end of the session.  Rachelle demonstrated good  understanding of the education provided. See EMR under Patient Instructions for exercises provided during therapy sessions.    Assessment     Patient tolerated all treatment that focused on her low back and hip with pain causing patient to become emotional. Exercises were performed with gentle effort due to the level of pain today. She continues to require maximum cues for performing posterior pelvic tilt with good quality. She was not able to be progressed today due to pain. She is noted to have tightness with right lower extremity nerve glides. She reports decreased pain following this session. Initial  appeared to be distracted and  had to leave this session and have another  finish this session.     Rachelle is progressing well towards her goals.   Patient prognosis is Fair.     Patient will continue to benefit from skilled outpatient physical therapy to address the deficits listed in the problem list box on initial evaluation, provide pt/family education and to maximize patient's level of independence in the home and community environment.     Patient's spiritual, cultural and educational needs considered and pt agreeable to plan of care and goals.       Anticipated Barriers for therapy: co-morbidities, chronicity of condition, lack of understanding of  condition, adherence to treatment plan, financial limitations, lack of support system, and coping style        Goals:  Reviewed: 3/6/2024       Short Term Goals: In 4 weeks  Progress Date   1.Patient to be educated on HEP. [x] Progressing  [] MET   [] Not MET   [] Not tested     2.Patient to increase cervical and trunk range of motion and right knee range of motion, in order to improve available range of motion for ADL's.  [x] Progressing  [] MET   [] Not MET  [] Not tested     3.Patient to increase bilateral upper extremities/lower extremities  strength by 1/2 grade, in order to improve endurance and increase ability to perform all functional activities for increased time.  [] Progressing  [] MET   [] Not MET  [] Not tested     4.Patient to have pain less than 4/10 at worst, to improve QOL. [x] Progressing  [] MET   [] Not MET  [] Not tested     5.Patient to improve score on the FOTO, to improve QOL. [x] Progressing  [] MET   [] Not MET  [] Not tested     6. Patient to score on 5 times sit to stand with long term goals made in order to decrease fall risk. [x] Progressing  [] MET   [] Not MET  [] Not tested        Long Term Goals: In 8 weeks Progress Date   1.Patient to improve score on the FOTO predicted score or better, to improve QOL. [x] Progressing  [] MET   [] Not MET  [] Not tested     2. Patient to increase bilateral upper extremities / lower extremities  strength to 4+/5 or greater, in order to improve endurance and increase ability to perform all functional activities for increased time. [x] Progressing  [] MET   [] Not MET  [] Not tested     3. Patient to have decreased pain to 1/10 at worst, to improve QOL. [x] Progressing  [] MET   [] Not MET  [] Not tested     4. Patient to have long term goals for score on 5 times sit to stand , in order to improve endurance and decrease fall risk. [x] Progressing  [] MET   [] Not MET  [] Not tested     5. Patient to perform daily activities including walking on  treadmill without increased symptoms. [x] Progressing  [] MET   [] Not MET  [] Not tested          Plan     Continue Plan of Care (POC) and progress per patient tolerance. See treatment section for details on planned progressions next session.      Guanaco Reyes, PTA

## 2024-03-20 ENCOUNTER — OFFICE VISIT (OUTPATIENT)
Dept: PAIN MEDICINE | Facility: CLINIC | Age: 69
End: 2024-03-20
Payer: MEDICARE

## 2024-03-20 ENCOUNTER — PATIENT MESSAGE (OUTPATIENT)
Dept: PAIN MEDICINE | Facility: CLINIC | Age: 69
End: 2024-03-20

## 2024-03-20 ENCOUNTER — CLINICAL SUPPORT (OUTPATIENT)
Dept: REHABILITATION | Facility: HOSPITAL | Age: 69
End: 2024-03-20
Payer: MEDICARE

## 2024-03-20 VITALS
WEIGHT: 156.5 LBS | SYSTOLIC BLOOD PRESSURE: 145 MMHG | HEART RATE: 81 BPM | BODY MASS INDEX: 30.73 KG/M2 | HEIGHT: 60 IN | DIASTOLIC BLOOD PRESSURE: 80 MMHG

## 2024-03-20 DIAGNOSIS — R52 PAIN: ICD-10-CM

## 2024-03-20 DIAGNOSIS — M47.896 OTHER SPONDYLOSIS, LUMBAR REGION: ICD-10-CM

## 2024-03-20 DIAGNOSIS — R68.89 DECREASED FUNCTIONAL ACTIVITY TOLERANCE: ICD-10-CM

## 2024-03-20 DIAGNOSIS — R26.9 GAIT ABNORMALITY: Primary | ICD-10-CM

## 2024-03-20 DIAGNOSIS — M48.062 NEUROGENIC CLAUDICATION DUE TO LUMBAR SPINAL STENOSIS: ICD-10-CM

## 2024-03-20 DIAGNOSIS — M54.16 LUMBAR RADICULITIS: Primary | ICD-10-CM

## 2024-03-20 DIAGNOSIS — R53.1 DECREASED RANGE OF MOTION WITH DECREASED STRENGTH: ICD-10-CM

## 2024-03-20 DIAGNOSIS — M51.16 LUMBAR DISC DISEASE WITH RADICULOPATHY: ICD-10-CM

## 2024-03-20 DIAGNOSIS — M25.60 DECREASED RANGE OF MOTION WITH DECREASED STRENGTH: ICD-10-CM

## 2024-03-20 PROCEDURE — 99999 PR PBB SHADOW E&M-EST. PATIENT-LVL II: CPT | Mod: PBBFAC,,, | Performed by: PAIN MEDICINE

## 2024-03-20 PROCEDURE — 97112 NEUROMUSCULAR REEDUCATION: CPT

## 2024-03-20 PROCEDURE — 99212 OFFICE O/P EST SF 10 MIN: CPT | Mod: PBBFAC | Performed by: PAIN MEDICINE

## 2024-03-20 PROCEDURE — 97110 THERAPEUTIC EXERCISES: CPT

## 2024-03-20 PROCEDURE — 99214 OFFICE O/P EST MOD 30 MIN: CPT | Mod: S$PBB,,, | Performed by: PAIN MEDICINE

## 2024-03-20 RX ORDER — GABAPENTIN 300 MG/1
300 CAPSULE ORAL 3 TIMES DAILY
Qty: 90 CAPSULE | Refills: 11 | Status: SHIPPED | OUTPATIENT
Start: 2024-03-20 | End: 2024-05-02

## 2024-03-20 NOTE — H&P (VIEW-ONLY)
Chronic Pain Note (Initial Visit)    Referring Physician: Checo Fletcher    PCP: Faina Peacock MD    Chief Complaint:   Chief Complaint   Patient presents with    Low-back Pain    Neck Pain    Leg Pain    Shoulder Pain        SUBJECTIVE:    3/20/2024: Follow Up Visit:  Rachelle is here for a f/u visit after the lumbar spine MRI.  The results were discussed with her and are detailed below.  I also did independent interpretation of the films.  She has chronic low back pain and right leg radicular pain over L4 and L5 dermatomes.  We also ordered physical therapy, and started her on nabumetone and gabapentin.  She has done 6 weeks of therapy so far and it was helping.  The medications are helping very little.  No side effects.  The pain continues to get worse with prolonged walking and standing.  She has not been able to work for the last 3 months.  She has a personal caregiver.    02/21/2024 Initial visit:  Interview today was conducted through an .  Rachelle Philip is a 68 y.o. female who presents to the clinic for the evaluation of chronic low back pain (5 years) that has gotten severe over the past 3 years.  No triggering events.  It radiates to the right buttock, outer thigh, outer leg and in her leg to the ankle (L4/L5 dermatomes).  Reports intermittent tingling and numbness to the right foot and intermittent weakness in the right leg.  Denies falls or loss of bowel or bladder control.  The pain is worse with prolonged walking and standing and better with rest.  Exercise helps a little.  She currently rates it at 7/10 and is worst at 10/10 and best at 10/10.  She also has chronic neck pain that is axial and that has gotten worse over the past 3 years.  Her lower back pain is worse than the neck pain.  It is affecting her ADLs.      Patient denies night fever/night sweats.    Treatment History:  Temporary relief with Tylenol.  Partial relief with the exercises and physical therapy.  No  relief with meloxicam.  Currently doing PT for her knee but not specifically for the back.    Radiological workup:  Last MRI of the lumbar spine was in 2018.  Detailed below.    Pain Disability Index Review:         3/20/2024     9:57 AM   Last 3 PDI Scores   Pain Disability Index (PDI) 41        report:  Reviewed and consistent with medication use as prescribed.      Past Medical History:   Diagnosis Date    Diabetes mellitus     Hypertension      Past Surgical History:   Procedure Laterality Date    TUBAL LIGATION       Social History     Socioeconomic History    Marital status: Single   Occupational History    Occupation: caregiver   Tobacco Use    Smoking status: Never     Passive exposure: Never    Smokeless tobacco: Never   Substance and Sexual Activity    Alcohol use: Never    Drug use: Never    Sexual activity: Not Currently     Family History   Problem Relation Age of Onset    Diabetes Mother     Heart disease Mother         ?    Hypertension Mother        Review of patient's allergies indicates:  No Known Allergies    Current Outpatient Medications   Medication Sig    aspirin (ECOTRIN) 81 MG EC tablet Take 81 mg by mouth once daily.    atorvastatin (LIPITOR) 80 MG tablet Take 1 tablet (80 mg total) by mouth once daily.    ergocalciferol (ERGOCALCIFEROL) 50,000 unit Cap ergocalciferol (vitamin D2) 1,250 mcg (50,000 unit) capsule   TAKE 1 CAPSULE BY MOUTH ONCE A WEEK    gabapentin (NEURONTIN) 100 MG capsule Take 1 capsule (100 mg total) by mouth 3 (three) times daily.    hydrOXYzine pamoate (VISTARIL) 50 MG Cap Take 50 mg by mouth every evening.    insulin aspart protamine-insulin aspart (NOVOLOG MIX 70-30FLEXPEN U-100) 100 unit/mL (70-30) InPn pen 45 u  sq BID. Dispense  6  boxes    losartan (COZAAR) 100 MG tablet TAKE 1 TABLET BY MOUTH ONCE DAILY    metFORMIN (GLUCOPHAGE) 1000 MG tablet Take 1 tablet (1,000 mg total) by mouth daily with breakfast.    nabumetone (RELAFEN) 500 MG tablet Take 1 tablet (500  "mg total) by mouth 2 (two) times daily.    omeprazole (PRILOSEC) 40 MG capsule Take 1 capsule (40 mg total) by mouth once daily.    pen needle, diabetic (BD BRANT 2ND GEN PEN NEEDLE) 32 gauge x 5/32" Ndle BD Brant 2nd Gen Pen Needle 32 gauge x 5/32"  USE TO INJECT INSULIN TWICE DAILY    TRUE METRIX GLUCOSE TEST STRIP Strp Check glucose TID     No current facility-administered medications for this visit.       Review of Systems     GENERAL:  No weight loss, malaise or fevers.  HEENT:   No recent changes in vision or hearing  NECK:  Negative for lumps, no difficulty with swallowing.  RESPIRATORY:  Negative for cough, wheezing or shortness of breath, patient denies any recent URI.  CARDIOVASCULAR:  Negative for chest pain, leg swelling or palpitations.  GI:  Negative for abdominal discomfort, blood in stools or black stools or change in bowel habits.  MUSCULOSKELETAL:  See HPI.  SKIN:  Negative for lesions, rash, and itching.  PSYCH:  No mood disorder or recent psychosocial stressors.  Patients sleep is not disturbed secondary to pain.  HEMATOLOGY/LYMPHOLOGY:  Negative for prolonged bleeding, bruising easily or swollen nodes.  Patient is not currently taking any anti-coagulants  NEURO:   No history of headaches, syncope, paralysis, seizures or tremors.  All other reviewed and negative other than HPI.    OBJECTIVE:    BP (!) 145/80   Pulse 81   Ht 5' (1.524 m)   Wt 71 kg (156 lb 8.4 oz)   BMI 30.57 kg/m²         Physical Exam    GENERAL: Well appearing, in no acute distress, alert and oriented x3.  PSYCH:  Mood and affect appropriate.  SKIN: Skin color, texture, turgor normal, no rashes or lesions.  HEAD/FACE:  Normocephalic, atraumatic.  CV: No edema.  PULM: No evidence of respiratory difficulty, symmetric chest rise.  GI:  Abdomen soft and non-tender.    GAIT: slow    LUMBAR SPINE:   Palpation: Tenderness over bilateral facet joints and paraspinous muscles.   ROM: Pain with extension and rotation.  Straight leg " raising is positive on the right.  SI JOINTS: bilateral SI joints, no tenderness, negative Donny's  HIPS: normal and nonpainful ROM of both hip joints.    NEURO:   MOTOR: Bilateral upper and lower extremity muscle strength is normal. No atrophy or tone abnormalities are noted.  SENSORY: No loss of sensation in C4 through T1 dermatomes bilaterally, and in L3 through S1 dermatomes except for diminished pinprick sensation over the right S1 dermatome.  DTR's: Reflexes are physiologic and symmetric in upper and lower extremities except for weak right knee reflex. No clonus.  Negative Tovar's bilaterally.     Imaging:     Lumbar spine MRI on 02/21/2024:    1. Severe bilateral facet arthropathy with grade 1 anterolisthesis of L3 on L4 and L4 on L5.  2. Severe central canal stenosis at L4/5 and complete obliteration of the right lateral recess with impingement upon the descending right L5 nerve root in its lateral recess, and severe right foraminal stenosis with impingement upon the right L4 nerve root.  3. L3/4 moderate central canal stenosis.  Mild right and moderate left foraminal stenosis.  4. Moderate left L2-L3 neural foraminal stenosis.    Lumbar spine MRI  06/26/18     Grade 1 spondylolisthesis at the L3-4 and L4-5 levels. L5 is a transitional vertebra.    Bilateral L3/4 bony neural foraminal narrowing with possible but not definite L3 nerve root impingements.  L4-L5: right-sided neural foraminal narrowing and right L4 nerve root impingement. Moderate central spinal stenosis.      LABS:  Lab Results   Component Value Date    WBC 7.50 03/01/2024    HGB 13.7 03/01/2024    HCT 41.4 03/01/2024    MCV 92 03/01/2024     03/01/2024       CMP  Sodium   Date Value Ref Range Status   03/07/2024 140 136 - 145 mmol/L Final     Potassium   Date Value Ref Range Status   03/07/2024 4.5 3.5 - 5.1 mmol/L Final     Chloride   Date Value Ref Range Status   03/07/2024 105 95 - 110 mmol/L Final     CO2   Date Value Ref Range  Status   03/07/2024 26 23 - 29 mmol/L Final     Glucose   Date Value Ref Range Status   03/07/2024 137 (H) 70 - 110 mg/dL Final     BUN   Date Value Ref Range Status   03/07/2024 21 8 - 23 mg/dL Final     Creatinine   Date Value Ref Range Status   03/07/2024 1.0 0.5 - 1.4 mg/dL Final     Calcium   Date Value Ref Range Status   03/07/2024 10.4 8.7 - 10.5 mg/dL Final     Total Protein   Date Value Ref Range Status   03/07/2024 7.3 6.0 - 8.4 g/dL Final     Albumin   Date Value Ref Range Status   03/07/2024 4.0 3.5 - 5.2 g/dL Final     Total Bilirubin   Date Value Ref Range Status   03/07/2024 0.3 0.1 - 1.0 mg/dL Final     Comment:     For infants and newborns, interpretation of results should be based  on gestational age, weight and in agreement with clinical  observations.    Premature Infant recommended reference ranges:  Up to 24 hours.............<8.0 mg/dL  Up to 48 hours............<12.0 mg/dL  3-5 days..................<15.0 mg/dL  6-29 days.................<15.0 mg/dL       Alkaline Phosphatase   Date Value Ref Range Status   03/07/2024 73 55 - 135 U/L Final     AST   Date Value Ref Range Status   03/07/2024 19 10 - 40 U/L Final     ALT   Date Value Ref Range Status   03/07/2024 18 10 - 44 U/L Final     Anion Gap   Date Value Ref Range Status   03/07/2024 9 8 - 16 mmol/L Final       Lab Results   Component Value Date    HGBA1C 6.8 (H) 03/01/2024             ASSESSMENT: 68 y.o. year old female with:    1. Lumbar radiculitis        2. Other spondylosis, lumbar region        3. Neurogenic claudication due to lumbar spinal stenosis        4. Lumbar disc disease with radiculopathy                  PLAN:     Updated MRI showed severe right L4-5 lateral recess and foraminal stenosis from disc bulging and facet hypertrophy with significant impingement of the right L4 and L5 nerve roots (and only moderate central spinal stenosis, although radiologist report described it as severe).  She is to finish physical therapy and  to continue home exercises.  We will titrate gabapentin to 300 mg 3 times daily.  Counseled her about the possible side effects of drowsiness and dizziness.    Discussed lumbar DIEGO and she is eager to proceed due to severe pain and inability to work.  We will order right L4 and L5 transforaminal DIEGO.  Follow-up 2 weeks after the injection.  Instructed her to stop nabumetone as it gave her little relief.  May take Tylenol as needed.  We will give her a note stating that she we will not be able to return to work before 4 weeks.  She has a personal caregiver.  Counseled patient regarding the importance of activity modification.  Patient Questions: Answered all of the patient's questions regarding diagnosis, therapy, and treatment.  Records:Reviewed old records from treating physicians and imaging .    The above plan and management options were discussed at length with patient. Patient is in agreement with the above and verbalized understanding.      Jose Betts MD  Interventional Pain Management  [facility]    Disclaimer:  This note was prepared using voice recognition system and is likely to have sound alike errors that may have been overlooked even after proof reading.  Please call me with any questions

## 2024-03-20 NOTE — PROGRESS NOTES
[facility]   Physical Therapy Treatment Note        Name: Rachelle Philip  [facility] Number: [id number]    Therapy Diagnosis:   Encounter Diagnoses   Name Primary?    Gait abnormality Yes    Pain     Decreased range of motion with decreased strength     Decreased functional activity tolerance      Physician: Gavin Ocampo MD    Visit Date: 3/20/2024      Physician Orders: PT Eval and Treat  Medical Diagnosis from Referral: Lumbar radiculitis; Chronic neck pain; Myofascial pain syndrome, cervical  Evaluation Date: 3/6/2024  Authorization Period Expiration: 12/31/24  Plan of Care Expiration: 5/5/2024  Progress Note Due: 4/5/2024  Visit # / Visits authorized: 9/20 (+2)  FOTO: 1/3 (last performed on 3/6/2024)       Precautions: Standard, Diabetes, spondylolisthesis and HTN   PTA Visit #: 1/5     Time In: 3:00 pm  Time Out: 4:00 pm  Total Billable Time: 58 minutes (Billing reflects 1 on 1 treatment time spent with patient)    Subjective     All communication through use of  services.     Patient reports: pain has been better since therapy, 4/10.  [place] better after last visit, has not done any exercise classes but did do therapy exercises and did help. Encouraged patient to avoid LB extension and use bike at gym, try and do a pilates class. Does complain of medial knee pain on right side which improved after SLR and long arc quad.  Patient reports much better at end of today's treatment session.    She was compliant with home exercise program.    Response to previous treatment: feeling okay after evaluation    Functional change: not performing home chores, and not working at this time    Pain: 4/10     Location: right shoulder/Low Back     Objective      Objective Measures updated at progress report or POC update only unless otherwise noted.       Treatment     Rachelle received the treatments listed below:     MANUAL THERAPY TECHNIQUES were applied for (8) minutes, including:    Manual  Intervention Performed Today    Soft Tissue Mobilization [x] Bilateral  Upper and mid gluteals, piriformis muscle    Joint Mobilizations []    Long axis hip distraction  [x] Right side    []    Functional Dry Needling  []      Plan for Next Visit: Continue as needed         THERAPEUTIC EXERCISES to develop strength, endurance, ROM, flexibility, posture, and core stabilization for (26) minutes including:    Intervention Performed Today    Nustep x 8 minutes    PASSIVE RANGE OF MOTION right hip (added)  x 2 minutes    Scapula retraction - 2 x 10 sitting   Scapula depression  - 2 x 10 supine 5 sec hold   Scapula protraction - 2 x 10 supine    Elbow press/shoulder extension supine  2 x 10;  3 seconds hold   Upper trapezius stretch     Single knee to chest  x 5 x 10 seconds hold, bilateral alternating sides   LTR x 10x   Lower trunk rotation with hip mobility x 3 minutes    Piriformis stretch x 3 x 45 seconds    LAQ x 10x/ 5 sec right side     Rachelle participated in neuromuscular re-education activities to improve: Balance, Coordination, Proprioception, and Posture for 24 minutes. The following activities were included:      Intervention 12/12/2023  Parameters   Supine clamshells  x 3 min red band    Posterior pelvic tilt  x 3 minutes 5 seconds hold - extensive cue   Bilateral shoulder external rotation  - RED band  sitting 10x/ 2   Cervical retraction  - x 3 minutes supine 1 pillow with gentle effort   Cervical rotation - x 10 bilateral supine 1 pillow    Cervical rotation sitting - 10x   PPT with heel slides x Alternating sides - extensive cues 10x each side   Isometric hip ADD x 3 min 10 sec hold with ball   SLR with PPT x 10x/ 3 sets each leg   Sciatic nerve glides x 5x ankle pumps, 5 sets bilateral         The following Modalities were applied for (0) minutes after being cleared for all contraindications with the following parameters    Modality Location Details   Moist Hot Pack  Right lateral hip Prone in neutral  extension       Plan for Next Visit: Progress as indicated         Patient Education and Home Exercises       Home Exercises Provided and Patient Education Provided     Education provided: (during session) minutes  PURPOSE: Patient educated on the impairments noted above and the effects of physical therapy intervention to improve overall condition and QOL.   EXERCISE: Patient was educated on all the above exercise prior/during/after for proper posture, positioning, and execution for safe performance with home exercise program.   STRENGTH: Patient educated on the importance of improved core and extremity strength in order to improve alignment of the spine and extremities with static positions and dynamic movement.   3/18/2024: educated that the activity that was performed yesterday may have been too much activity without a rest resulting in additional pain.     Written Home Exercises Provided: patient instructed to continue with prior issued home exercise program.  Exercises were reviewed and Rachelle was able to demonstrate them prior to the end of the session.  Rachelle demonstrated good  understanding of the education provided. See EMR under Patient Instructions for exercises provided during therapy sessions.    Assessment     Patient with improved tolerance to all intervention today and was able to progress with LB/core stability - extensive cues (verbal and manual) for all interventions and patient with improved trunk control with lower extremity motion. Encouraged home exercise program.    Rachelle is progressing well towards her goals.   Patient prognosis is Fair.     Patient will continue to benefit from skilled outpatient physical therapy to address the deficits listed in the problem list box on initial evaluation, provide pt/family education and to maximize patient's level of independence in the home and community environment.     Patient's spiritual, cultural and educational needs considered and pt agreeable to plan  of care and goals.       Anticipated Barriers for therapy: co-morbidities, chronicity of condition, lack of understanding of condition, adherence to treatment plan, financial limitations, lack of support system, and coping style        Goals:  Reviewed: 3/6/2024       Short Term Goals: In 4 weeks  Progress Date   1.Patient to be educated on HEP. [x] Progressing  [] MET   [] Not MET   [] Not tested     2.Patient to increase cervical and trunk range of motion and right knee range of motion, in order to improve available range of motion for ADL's.  [x] Progressing  [] MET   [] Not MET  [] Not tested     3.Patient to increase bilateral upper extremities/lower extremities  strength by 1/2 grade, in order to improve endurance and increase ability to perform all functional activities for increased time.  [] Progressing  [] MET   [] Not MET  [] Not tested     4.Patient to have pain less than 4/10 at worst, to improve QOL. [x] Progressing  [] MET   [] Not MET  [] Not tested     5.Patient to improve score on the FOTO, to improve QOL. [x] Progressing  [] MET   [] Not MET  [] Not tested     6. Patient to score on 5 times sit to stand with long term goals made in order to decrease fall risk. [x] Progressing  [] MET   [] Not MET  [] Not tested        Long Term Goals: In 8 weeks Progress Date   1.Patient to improve score on the FOTO predicted score or better, to improve QOL. [x] Progressing  [] MET   [] Not MET  [] Not tested     2. Patient to increase bilateral upper extremities / lower extremities  strength to 4+/5 or greater, in order to improve endurance and increase ability to perform all functional activities for increased time. [x] Progressing  [] MET   [] Not MET  [] Not tested     3. Patient to have decreased pain to 1/10 at worst, to improve QOL. [x] Progressing  [] MET   [] Not MET  [] Not tested     4. Patient to have long term goals for score on 5 times sit to stand , in order to improve endurance and decrease fall  risk. [x] Progressing  [] MET   [] Not MET  [] Not tested     5. Patient to perform daily activities including walking on treadmill without increased symptoms. [x] Progressing  [] MET   [] Not MET  [] Not tested          Plan     Continue Plan of Care (POC) and progress per patient tolerance. See treatment section for details on planned progressions next session.      Geni Edwards, PT

## 2024-03-21 ENCOUNTER — PATIENT MESSAGE (OUTPATIENT)
Dept: PAIN MEDICINE | Facility: CLINIC | Age: 69
End: 2024-03-21
Payer: MEDICARE

## 2024-03-25 ENCOUNTER — CLINICAL SUPPORT (OUTPATIENT)
Dept: REHABILITATION | Facility: HOSPITAL | Age: 69
End: 2024-03-25
Payer: MEDICARE

## 2024-03-25 ENCOUNTER — PATIENT MESSAGE (OUTPATIENT)
Dept: PAIN MEDICINE | Facility: HOSPITAL | Age: 69
End: 2024-03-25
Payer: MEDICARE

## 2024-03-25 DIAGNOSIS — R68.89 DECREASED FUNCTIONAL ACTIVITY TOLERANCE: ICD-10-CM

## 2024-03-25 DIAGNOSIS — R26.9 GAIT ABNORMALITY: Primary | ICD-10-CM

## 2024-03-25 DIAGNOSIS — R52 PAIN: ICD-10-CM

## 2024-03-25 DIAGNOSIS — M25.60 DECREASED RANGE OF MOTION WITH DECREASED STRENGTH: ICD-10-CM

## 2024-03-25 DIAGNOSIS — R53.1 DECREASED RANGE OF MOTION WITH DECREASED STRENGTH: ICD-10-CM

## 2024-03-25 PROCEDURE — 97110 THERAPEUTIC EXERCISES: CPT | Mod: CQ

## 2024-03-25 NOTE — PRE-PROCEDURE INSTRUCTIONS
Spoke with patient regarding procedure scheduled on 4.3    Arrival time 0630     Has patient been sick with fever or on antibiotics within the last 7 days? No     Does the patient have any open wounds, sores or rashes? No     Does the patient have any recent fractures? no     Has patient received a vaccination within the last 7 days? No     Received the COVID vaccination? yes     Has the patient stopped all medications as directed? na     Does patient have a pacemaker, defibrillator, or implantable stimulator? No     Does the patient have a ride to and from procedure and someone reliable to remain with patient?       Is the patient diabetic? yes     Does the patient have sleep apnea? Or use O2 at home? no     Is the patient receiving sedation? yes     Is the patient instructed to remain NPO beginning at midnight the night before their procedure? yes     Procedure location confirmed with patient? Yes     Covid- Denies signs/symptoms. Instructed to notify PAT/MD if any changes.

## 2024-03-25 NOTE — PROGRESS NOTES
[facility]   Physical Therapy Treatment Note        Name: Rachelle Philip  Clinic Number: [id number]    Therapy Diagnosis:   Encounter Diagnoses   Name Primary?    Gait abnormality Yes    Pain     Decreased range of motion with decreased strength     Decreased functional activity tolerance      Physician: Gavin Ocampo MD    Visit Date: 3/25/2024      Physician Orders: PT Eval and Treat  Medical Diagnosis from Referral: Lumbar radiculitis; Chronic neck pain; Myofascial pain syndrome, cervical  Evaluation Date: 3/6/2024  Authorization Period Expiration: 12/31/24  Plan of Care Expiration: 5/5/2024  Progress Note Due: 4/5/2024  Visit # / Visits authorized: 10/20 (+2)  FOTO: 1/3 (last performed on 3/6/2024)       Precautions: Standard, Diabetes, spondylolisthesis and HTN   PTA Visit #: 1/5     Time In: 3:30 pm  Time Out: 4:30 pm  Total Billable Time: 55 minutes (Billing reflects 1 on 1 treatment time spent with patient)    Subjective     All communication through use of  services. [facility] #[id number]. At 1600 Dolly transferred services to another : [employer] #[id number]. PTA disconnected with [employer] at 1605 due to ipad having a low batter and then reconnecting with [employer] #[id number].    Patient reports: no pain today and has had a good weekend regarding being pain free.      She was compliant with home exercise program.    Response to previous treatment: feeling okay after evaluation    Functional change: not performing home chores, and not working at this time    Pain: 0/10     Location: right shoulder/Low Back     Objective      Objective Measures updated at progress report or POC update only unless otherwise noted.       Treatment     Rachelle received the treatments listed below:     MANUAL THERAPY TECHNIQUES were applied for (9) minutes, including:    Manual Intervention Performed Today    Soft Tissue Mobilization [x] Bilateral  Upper and mid gluteals, piriformis muscle right upper  trapezius, teres major and minor , subscapularis  glutes, piriformis   Joint Mobilizations []    Long axis hip distraction  [x] Right side    []    Functional Dry Needling  []      Plan for Next Visit: Continue as needed         THERAPEUTIC EXERCISES to develop strength, endurance, ROM, flexibility, posture, and core stabilization for (26) minutes including:    Intervention Performed Today    Nustep x 8 minutes    PASSIVE RANGE OF MOTION right hip (added)  x 2 minutes    Scapula retraction - 2 x 10 sitting   Scapula depression  - 2 x 10 supine 5 sec hold   Scapula protraction - 2 x 10 supine    Elbow press/shoulder extension supine  2 x 10;  3 seconds hold   Upper trapezius stretch     Single knee to chest  x 5 x 10 seconds hold, bilateral alternating sides   LTR x 10x   Lower trunk rotation with hip mobility x 3 minutes    Piriformis stretch x 3 x 45 seconds    LAQ  10x/ 5 sec right side     Rachelle participated in neuromuscular re-education activities to improve: Balance, Coordination, Proprioception, and Posture for 20 minutes. The following activities were included:      Intervention 12/12/2023  Parameters   Supine clamshells   3 min red band    Posterior pelvic tilt  x 3 minutes 5 seconds hold    Bilateral shoulder external rotation  - RED band  sitting 10x/ 2   Cervical retraction  x x 3 minutes supine 1 pillow with gentle effort   Cervical rotation x x 10 bilateral supine 1 pillow    Cervical rotation sitting - 10x   PPT with heel slides  Alternating sides - extensive cues 10x each side   Isometric hip ADD  3 min 10 sec hold with ball   SLR with PPT x 10x/ 3 sets each leg   Sciatic nerve glides x 5x ankle pumps, 5 sets bilateral         The following Modalities were applied for (0) minutes after being cleared for all contraindications with the following parameters    Modality Location Details   Moist Hot Pack  Right lateral hip Prone in neutral extension       Plan for Next Visit: Progress as indicated          Patient Education and Home Exercises       Home Exercises Provided and Patient Education Provided     Education provided: (during session) minutes  PURPOSE: Patient educated on the impairments noted above and the effects of physical therapy intervention to improve overall condition and QOL.   EXERCISE: Patient was educated on all the above exercise prior/during/after for proper posture, positioning, and execution for safe performance with home exercise program.   STRENGTH: Patient educated on the importance of improved core and extremity strength in order to improve alignment of the spine and extremities with static positions and dynamic movement.   3/18/2024: educated that the activity that was performed yesterday may have been too much activity without a rest resulting in additional pain.     Written Home Exercises Provided: patient instructed to continue with prior issued home exercise program.  Exercises were reviewed and Rachelle was able to demonstrate them prior to the end of the session.  Rachelle demonstrated good  understanding of the education provided. See EMR under Patient Instructions for exercises provided during therapy sessions.    Assessment     Patient with less cues required for core stability but still with noted decreased pelvic mobility.  Tenderness reported with manual therapy in her subscapularis and in her upper trapezius muscles. Patient left this session reporting no pain.     Rachelle is progressing well towards her goals.   Patient prognosis is Fair.     Patient will continue to benefit from skilled outpatient physical therapy to address the deficits listed in the problem list box on initial evaluation, provide pt/family education and to maximize patient's level of independence in the home and community environment.     Patient's spiritual, cultural and educational needs considered and pt agreeable to plan of care and goals.       Anticipated Barriers for therapy: co-morbidities, chronicity  of condition, lack of understanding of condition, adherence to treatment plan, financial limitations, lack of support system, and coping style        Goals:  Reviewed: 3/6/2024       Short Term Goals: In 4 weeks  Progress Date   1.Patient to be educated on HEP. [x] Progressing  [] MET   [] Not MET   [] Not tested     2.Patient to increase cervical and trunk range of motion and right knee range of motion, in order to improve available range of motion for ADL's.  [x] Progressing  [] MET   [] Not MET  [] Not tested     3.Patient to increase bilateral upper extremities/lower extremities  strength by 1/2 grade, in order to improve endurance and increase ability to perform all functional activities for increased time.  [] Progressing  [] MET   [] Not MET  [] Not tested     4.Patient to have pain less than 4/10 at worst, to improve QOL. [x] Progressing  [] MET   [] Not MET  [] Not tested     5.Patient to improve score on the FOTO, to improve QOL. [x] Progressing  [] MET   [] Not MET  [] Not tested     6. Patient to score on 5 times sit to stand with long term goals made in order to decrease fall risk. [x] Progressing  [] MET   [] Not MET  [] Not tested        Long Term Goals: In 8 weeks Progress Date   1.Patient to improve score on the FOTO predicted score or better, to improve QOL. [x] Progressing  [] MET   [] Not MET  [] Not tested     2. Patient to increase bilateral upper extremities / lower extremities  strength to 4+/5 or greater, in order to improve endurance and increase ability to perform all functional activities for increased time. [x] Progressing  [] MET   [] Not MET  [] Not tested     3. Patient to have decreased pain to 1/10 at worst, to improve QOL. [x] Progressing  [] MET   [] Not MET  [] Not tested     4. Patient to have long term goals for score on 5 times sit to stand , in order to improve endurance and decrease fall risk. [x] Progressing  [] MET   [] Not MET  [] Not tested     5. Patient to perform  daily activities including walking on treadmill without increased symptoms. [x] Progressing  [] MET   [] Not MET  [] Not tested          Plan     Continue Plan of Care (POC) and progress per patient tolerance. See treatment section for details on planned progressions next session.      Guanaco Reyes, PTA

## 2024-04-03 ENCOUNTER — HOSPITAL ENCOUNTER (OUTPATIENT)
Facility: HOSPITAL | Age: 69
Discharge: HOME | End: 2024-04-03
Attending: PAIN MEDICINE | Admitting: PAIN MEDICINE
Payer: MEDICARE

## 2024-04-03 VITALS
OXYGEN SATURATION: 95 % | SYSTOLIC BLOOD PRESSURE: 147 MMHG | DIASTOLIC BLOOD PRESSURE: 77 MMHG | BODY MASS INDEX: 30.69 KG/M2 | RESPIRATION RATE: 16 BRPM | TEMPERATURE: 98 F | WEIGHT: 156.31 LBS | HEART RATE: 74 BPM | HEIGHT: 60 IN

## 2024-04-03 DIAGNOSIS — M51.16 LUMBAR DISC DISEASE WITH RADICULOPATHY: Primary | ICD-10-CM

## 2024-04-03 DIAGNOSIS — M48.062 NEUROGENIC CLAUDICATION DUE TO LUMBAR SPINAL STENOSIS: ICD-10-CM

## 2024-04-03 LAB — POCT GLUCOSE: 163 MG/DL (ref 70–110)

## 2024-04-03 PROCEDURE — 64484 NJX AA&/STRD TFRM EPI L/S EA: CPT | Mod: RT,,, | Performed by: PAIN MEDICINE

## 2024-04-03 PROCEDURE — 25000003 PHARM REV CODE 250: Performed by: PAIN MEDICINE

## 2024-04-03 PROCEDURE — 25500020 PHARM REV CODE 255: Performed by: PAIN MEDICINE

## 2024-04-03 PROCEDURE — 64483 NJX AA&/STRD TFRM EPI L/S 1: CPT | Mod: RT,,, | Performed by: PAIN MEDICINE

## 2024-04-03 PROCEDURE — 64483 NJX AA&/STRD TFRM EPI L/S 1: CPT | Mod: RT | Performed by: PAIN MEDICINE

## 2024-04-03 PROCEDURE — 64484 NJX AA&/STRD TFRM EPI L/S EA: CPT | Mod: RT | Performed by: PAIN MEDICINE

## 2024-04-03 PROCEDURE — 63600175 PHARM REV CODE 636 W HCPCS: Performed by: PAIN MEDICINE

## 2024-04-03 RX ORDER — MIDAZOLAM HYDROCHLORIDE 1 MG/ML
INJECTION, SOLUTION INTRAMUSCULAR; INTRAVENOUS
Status: DISCONTINUED | OUTPATIENT
Start: 2024-04-03 | End: 2024-04-03 | Stop reason: HOSPADM

## 2024-04-03 RX ORDER — FENTANYL CITRATE 50 UG/ML
INJECTION, SOLUTION INTRAMUSCULAR; INTRAVENOUS
Status: DISCONTINUED | OUTPATIENT
Start: 2024-04-03 | End: 2024-04-03 | Stop reason: HOSPADM

## 2024-04-03 RX ORDER — LIDOCAINE HYDROCHLORIDE 10 MG/ML
INJECTION, SOLUTION EPIDURAL; INFILTRATION; INTRACAUDAL; PERINEURAL
Status: DISCONTINUED | OUTPATIENT
Start: 2024-04-03 | End: 2024-04-03 | Stop reason: HOSPADM

## 2024-04-03 RX ORDER — BETAMETHASONE SODIUM PHOSPHATE AND BETAMETHASONE ACETATE 3; 3 MG/ML; MG/ML
INJECTION, SUSPENSION INTRA-ARTICULAR; INTRALESIONAL; INTRAMUSCULAR; SOFT TISSUE
Status: DISCONTINUED | OUTPATIENT
Start: 2024-04-03 | End: 2024-04-03 | Stop reason: HOSPADM

## 2024-04-03 NOTE — DISCHARGE SUMMARY
[facility] - Pain Mgmt 1st Fl  Discharge Note  Short Stay    Procedure(s) (LRB):  Right L4 and L5 TF DIEGO (Right)      OUTCOME: Patient tolerated treatment/procedure well without complication and is now ready for discharge.    DISPOSITION: Home or Self Care    FINAL DIAGNOSIS:  <principal problem not specified>    FOLLOWUP: In clinic    DISCHARGE INSTRUCTIONS:  No discharge procedures on file.     TIME SPENT ON DISCHARGE: 5 minutes

## 2024-04-03 NOTE — OP NOTE
Rachelle Philip  68 y.o. female      Vitals:    04/03/24 0741   BP: 139/70   Pulse: 74   Resp: 15   Temp: 97.8 °F (36.6 °C)     Procedure Date:DATETODAY@     TRANSFORAMINAL EPIDURAL STEROID INJECTION:    INFORMED CONSENT: The procedure, risks, benefits and options were discussed with patient. There are no contraindications to the procedure. The patient expressed understanding and agreed to proceed. The personnel performing the procedure was discussed. I verify that I personally obtained consent prior to the start of the procedure and the signed consent can be found on the patient's chart.    Pre Procedure diagnosis: Lumbar radiculitis [M54.16]  Neurogenic claudication due to lumbar spinal stenosis [M48.062]  Lumbar disc disease with radiculopathy [M51.16]  Post-Procedure diagnosis: SAME    PROCEDURE: RIGHT L4/5 and L5/S1 TRANSFORAMINAL EPIDURAL STEROID INJECTION     Complications: None    Specimens: None      DESCRIPTION OF PROCEDURE: The patient was brought to the procedure room. IV access was obtained prior to the procedure. The patient was positioned prone on the fluoroscopy table. Continuous hemodynamic monitoring was initiated including blood pressure, EKG, and pulse oximetry. . The skin was prepped with chlorhexidine and draped in a sterile fashion. Skin anesthesia was achieved using a total of 10mL of lidocaine, 5mL over each respective injection site.     The right L4/5 and L5/S1 transforaminal spaces were identified with fluoroscopy in the  AP, oblique, and lateral views.  A 25 gauge spinal needle was then advanced into the area of the transforaminal spaces mentioned above with confirmation of proper needle position using AP, oblique, and lateral fluoroscopic views. Once the needle tip was in the area of the transforaminal space, and there was no blood, CSF or paraesthesias,  2 mL of Omnipaque 300mg/ml was injected.  Fluoroscopic imaging in the AP and lateral views revealed a clear outline of the spinal nerve  with proximal spread of agent through the neural foramen into the epidural space. A total of 12 mg of betamethasone and 1 cc of 1% lidocaine was injected with displacement of the contrast dye confirming that the medication went into the area of the transforaminal spaces mentioned above. A sterile dressing was applied.   Patient tolerated the procedure well.    Patient was taken back to the recovery room for further observation.     The patient was discharged to home in stable condition.    ANESTHESIA NOTE:   Conscious sedation provided by M.D    The patient was monitored with continuous pulse oximetry, EKG, and intermittent blood pressure monitors.  The patient was hemodynamically stable throughout the entire process was responsive to voice, and breathing spontaneously.  Supplemental O2 was provided at 2L/min via nasal cannula.  Patient was comfortable for the duration of the procedure. (See nurse documentation and case log for sedation time)    There was a total of 2mg IV Midazolam and 100 mcg Fentanyl titrated for the procedure.

## 2024-04-03 NOTE — DISCHARGE INSTRUCTIONS

## 2024-04-08 ENCOUNTER — OFFICE VISIT (OUTPATIENT)
Dept: FAMILY MEDICINE | Facility: CLINIC | Age: 69
End: 2024-04-08
Payer: MEDICARE

## 2024-04-08 VITALS
BODY MASS INDEX: 30.29 KG/M2 | TEMPERATURE: 97 F | SYSTOLIC BLOOD PRESSURE: 144 MMHG | OXYGEN SATURATION: 98 % | WEIGHT: 154.31 LBS | RESPIRATION RATE: 18 BRPM | DIASTOLIC BLOOD PRESSURE: 78 MMHG | HEART RATE: 88 BPM | HEIGHT: 60 IN

## 2024-04-08 DIAGNOSIS — E78.5 HYPERLIPIDEMIA ASSOCIATED WITH TYPE 2 DIABETES MELLITUS: ICD-10-CM

## 2024-04-08 DIAGNOSIS — Z00.00 ENCOUNTER FOR MEDICARE ANNUAL WELLNESS EXAM: ICD-10-CM

## 2024-04-08 DIAGNOSIS — K21.9 GASTROESOPHAGEAL REFLUX DISEASE WITHOUT ESOPHAGITIS: ICD-10-CM

## 2024-04-08 DIAGNOSIS — E11.9 TYPE 2 DIABETES MELLITUS WITHOUT COMPLICATION, WITH LONG-TERM CURRENT USE OF INSULIN: ICD-10-CM

## 2024-04-08 DIAGNOSIS — Z00.00 ENCOUNTER FOR PREVENTIVE HEALTH EXAMINATION: Primary | ICD-10-CM

## 2024-04-08 DIAGNOSIS — E11.59 HYPERTENSION COMPLICATING DIABETES: ICD-10-CM

## 2024-04-08 DIAGNOSIS — E08.42 DIABETIC POLYNEUROPATHY ASSOCIATED WITH DIABETES MELLITUS DUE TO UNDERLYING CONDITION: ICD-10-CM

## 2024-04-08 DIAGNOSIS — E11.69 HYPERLIPIDEMIA ASSOCIATED WITH TYPE 2 DIABETES MELLITUS: ICD-10-CM

## 2024-04-08 DIAGNOSIS — M48.062 NEUROGENIC CLAUDICATION DUE TO LUMBAR SPINAL STENOSIS: ICD-10-CM

## 2024-04-08 DIAGNOSIS — Z79.4 TYPE 2 DIABETES MELLITUS WITHOUT COMPLICATION, WITH LONG-TERM CURRENT USE OF INSULIN: ICD-10-CM

## 2024-04-08 DIAGNOSIS — I15.2 HYPERTENSION COMPLICATING DIABETES: ICD-10-CM

## 2024-04-08 PROCEDURE — G0439 PPPS, SUBSEQ VISIT: HCPCS | Mod: ,,, | Performed by: NURSE PRACTITIONER

## 2024-04-08 PROCEDURE — 99215 OFFICE O/P EST HI 40 MIN: CPT | Mod: PBBFAC,PO | Performed by: NURSE PRACTITIONER

## 2024-04-08 PROCEDURE — 99999 PR PBB SHADOW E&M-EST. PATIENT-LVL V: CPT | Mod: PBBFAC,,, | Performed by: NURSE PRACTITIONER

## 2024-04-08 NOTE — PROGRESS NOTES
Rachelle Philip presented for  INITIAL Medicare AWV today. The following components were reviewed and updated:    PT IS ALONE- NO  AND USES PHON TO COMMUNICATE WITH NP    Medical history  Family History  Social history  Allergies and Current Medications  Health Risk Assessment  Health Maintenance  Care Team    **See Completed Assessments for Annual Wellness visit with in the encounter summary    The following assessments were completed:  Depression Screening  Cognitive function Screening  Timed Get Up Test  Whisper Test      Opioid documentation:      Patient does not have a current opioid prescription.          Vitals:    04/08/24 1510   BP: (!) 144/78   Pulse: 88   Resp: 18   Temp: 97.1 °F (36.2 °C)   SpO2: 98%   Weight: 70 kg (154 lb 5.2 oz)   Height: 5' (1.524 m)     Body mass index is 30.14 kg/m².       Physical Exam  Vitals and nursing note reviewed.   Constitutional:       Appearance: Normal appearance.   HENT:      Head: Normocephalic.   Eyes:      Pupils: Pupils are equal, round, and reactive to light.   Cardiovascular:      Rate and Rhythm: Normal rate and regular rhythm.      Pulses: Normal pulses.      Heart sounds: Normal heart sounds.   Pulmonary:      Effort: Pulmonary effort is normal.      Breath sounds: Normal breath sounds.   Musculoskeletal:         General: Normal range of motion.   Skin:     General: Skin is warm.   Neurological:      Mental Status: She is alert and oriented to person, place, and time.   Psychiatric:         Mood and Affect: Mood normal.         Behavior: Behavior normal.         Thought Content: Thought content normal.         Judgment: Judgment normal.      Comments: Pt speaks Yi- difficutlt to communiate  Use phone for intrepreter           Current Outpatient Medications:     aspirin (ECOTRIN) 81 MG EC tablet, Take 81 mg by mouth once daily., Disp: , Rfl:     atorvastatin (LIPITOR) 80 MG tablet, Take 1 tablet (80 mg total) by mouth once daily., Disp: 90 tablet,  "Rfl: 3    ergocalciferol (ERGOCALCIFEROL) 50,000 unit Cap, ergocalciferol (vitamin D2) 1,250 mcg (50,000 unit) capsule  TAKE 1 CAPSULE BY MOUTH ONCE A WEEK, Disp: , Rfl:     gabapentin (NEURONTIN) 300 MG capsule, Take 1 capsule (300 mg total) by mouth 3 (three) times daily., Disp: 90 capsule, Rfl: 11    hydrOXYzine pamoate (VISTARIL) 50 MG Cap, Take 50 mg by mouth every evening., Disp: , Rfl:     insulin aspart protamine-insulin aspart (NOVOLOG MIX 70-30FLEXPEN U-100) 100 unit/mL (70-30) InPn pen, 45 u  sq BID. Dispense  6  boxes, Disp: 6 each, Rfl: 0    losartan (COZAAR) 100 MG tablet, TAKE 1 TABLET BY MOUTH ONCE DAILY, Disp: 90 tablet, Rfl: 1    metFORMIN (GLUCOPHAGE) 1000 MG tablet, Take 1 tablet (1,000 mg total) by mouth daily with breakfast., Disp: 90 tablet, Rfl: 0    nabumetone (RELAFEN) 500 MG tablet, Take 1 tablet (500 mg total) by mouth 2 (two) times daily., Disp: 30 tablet, Rfl: 0    omeprazole (PRILOSEC) 40 MG capsule, Take 1 capsule (40 mg total) by mouth once daily., Disp: 90 capsule, Rfl: 3    pen needle, diabetic (BD BRANT 2ND GEN PEN NEEDLE) 32 gauge x 5/32" Ndle, BD Brant 2nd Gen Pen Needle 32 gauge x 5/32"  USE TO INJECT INSULIN TWICE DAILY, Disp: 180 each, Rfl: 1    TRUE METRIX GLUCOSE TEST STRIP Strp, Check glucose TID, Disp: 270 strip, Rfl: 1    Diagnoses and health risks identified today and associated recommendations/orders:  1. Encounter for preventive health examination  Reviewed missed vaccine  Pt request to wait to due colonoscopy    2. Encounter for Medicare annual wellness exam  Ambulatory Referral/Consult to Enhanced Annual Wellness Visit (eAWV)    3. Type 2 diabetes mellitus without complication, with long-term current use of insulin  Chronic and Stable on 70 /30 insulin and Metformin  Discussed and recommend  low fat/carb/chol diet. Cardio exercise as tolerated. Life style modifications.  Continue current treatment plan as previously prescribed with your PCP    5. Hypertension " complicating diabetes  Chronic and Stable on Losartan. Continue current treatment plan as previously prescribed with your PCP    6. Gastroesophageal reflux disease without esophagitis  Chronic and Stable on Prilosec. Continue current treatment plan as previously prescribed with your PCP    7. Diabetic polyneuropathy associated with diabetes mellitus due to underlying condition  Chronic and Stable on . Continue current treatment plan as previously prescribed with your PCP    8. BMI 28.0-28.9,adult  BMI 30 KG  Discussed and recommend  low fat/carb/chol diet. Cardio exercise as tolerated. Life style modifications.     9. Neurogenic claudication due to lumbar spinal stenosis   Chronic and Stable. S/p lumbar injection 4/324- taking gabapentin-states up setting her stomach  Instructed to decrease to bid  Instructed to  f/u with pain concerning med    10. Hyperlipidemia associated with type 2 diabetes mellitus   Chronic and Ongoing on Lipitor.   Discussed and recommend  low fat/carb/chol diet. Cardio exercise as tolerated. Life style modifications.  Continue current treatment plan as previously prescribed with your PCP    Provided Rachelle with a 5-10 year written screening schedule and personal prevention plan. Recommendations were developed using the USPSTF age appropriate recommendations. Education, counseling, and referrals were provided as needed.  After Visit Summary printed and given to patient which includes a list of additional screenings\tests needed.    Follow up in about 1 year (around 4/8/2025) for Follow up with PCP.  Caren Raymond NP

## 2024-04-08 NOTE — PATIENT INSTRUCTIONS
Counseling and Referral of Other Preventative  (Italic type indicates deductible and co-insurance are waived)    Patient Name: Rachelle Philip  Today's Date: 4/8/2024    Health Maintenance       Date Due Completion Date    Hepatitis C Screening Never done ---    Eye Exam Never done ---    TETANUS VACCINE Never done ---    Colorectal Cancer Screening Never done ---    RSV Vaccine (Age 60+ and Pregnant patients) (1 - 1-dose 60+ series) Never done ---    COVID-19 Vaccine (3 - 2023-24 season) 09/01/2023 9/24/2021    Hemoglobin A1c 09/01/2024 3/1/2024    Diabetes Urine Screening 09/25/2024 9/25/2023    Foot Exam 09/25/2024 9/25/2023    Mammogram 10/18/2024 10/18/2023    Lipid Panel 03/01/2025 3/1/2024    DEXA Scan 10/18/2026 10/18/2023        No orders of the defined types were placed in this encounter.    The following information is provided to all patients.  This information is to help you find resources for any of the problems found today that may be affecting your health:                  Living healthy guide: www.[facility].[place].gov      Understanding Diabetes: www.diabetes.org      Eating healthy: www.cdc.gov/healthyweight      CDC home safety checklist: www.cdc.gov/steadi/patient.html      Agency on Aging: www.goea.[place].[facility]      Alcoholics anonymous (AA): www.aa.org      Physical Activity: www.[place].nih.gov/ea6ytnf      Tobacco use: www.quitwithusla.org

## 2024-04-09 ENCOUNTER — OFFICE VISIT (OUTPATIENT)
Dept: FAMILY MEDICINE | Facility: CLINIC | Age: 69
End: 2024-04-09
Payer: MEDICARE

## 2024-04-09 VITALS
DIASTOLIC BLOOD PRESSURE: 70 MMHG | WEIGHT: 154.75 LBS | HEART RATE: 76 BPM | HEIGHT: 60 IN | BODY MASS INDEX: 30.38 KG/M2 | TEMPERATURE: 96 F | OXYGEN SATURATION: 96 % | SYSTOLIC BLOOD PRESSURE: 130 MMHG

## 2024-04-09 DIAGNOSIS — T78.40XA ALLERGIC REACTION TO DRUG, INITIAL ENCOUNTER: Primary | ICD-10-CM

## 2024-04-09 PROCEDURE — 99999 PR PBB SHADOW E&M-EST. PATIENT-LVL IV: CPT | Mod: PBBFAC,,, | Performed by: NURSE PRACTITIONER

## 2024-04-09 PROCEDURE — 99214 OFFICE O/P EST MOD 30 MIN: CPT | Mod: PBBFAC,PO | Performed by: NURSE PRACTITIONER

## 2024-04-09 PROCEDURE — 99214 OFFICE O/P EST MOD 30 MIN: CPT | Mod: S$PBB,,, | Performed by: NURSE PRACTITIONER

## 2024-04-09 PROCEDURE — 96372 THER/PROPH/DIAG INJ SC/IM: CPT | Mod: PBBFAC,PO

## 2024-04-09 PROCEDURE — 99999PBSHW PR PBB SHADOW TECHNICAL ONLY FILED TO HB: Mod: PBBFAC,,,

## 2024-04-09 RX ORDER — LORATADINE 10 MG/1
10 TABLET ORAL DAILY
Qty: 7 TABLET | Refills: 0 | Status: SHIPPED | OUTPATIENT
Start: 2024-04-09 | End: 2024-05-02 | Stop reason: SDUPTHER

## 2024-04-09 RX ORDER — FAMOTIDINE 20 MG/1
20 TABLET, FILM COATED ORAL 2 TIMES DAILY
Qty: 14 TABLET | Refills: 0 | Status: SHIPPED | OUTPATIENT
Start: 2024-04-09 | End: 2024-05-02 | Stop reason: SDUPTHER

## 2024-04-09 RX ORDER — METHYLPREDNISOLONE ACETATE 80 MG/ML
80 INJECTION, SUSPENSION INTRA-ARTICULAR; INTRALESIONAL; INTRAMUSCULAR; SOFT TISSUE
Status: COMPLETED | OUTPATIENT
Start: 2024-04-09 | End: 2024-04-09

## 2024-04-09 RX ADMIN — METHYLPREDNISOLONE ACETATE 80 MG: 80 INJECTION, SUSPENSION INTRALESIONAL; INTRAMUSCULAR; INTRASYNOVIAL; SOFT TISSUE at 07:04

## 2024-04-15 NOTE — PROGRESS NOTES
"Subjective:       Patient ID: Rachelle Philip is a 68 y.o. female.    Chief Complaint: Medication Reaction  Pt reports to clinic with chief complaint of generalized itching worsening on palms of hands with rash.  Onset 2 weeks ago.  Reports only new exposure is gabapentin.  Symptoms worsened after beginning use.  No angioedema, difficulty talking or swallowing    Past Medical History:   Diagnosis Date    Diabetes mellitus     Hypertension       Current Outpatient Medications on File Prior to Visit   Medication Sig Dispense Refill    aspirin (ECOTRIN) 81 MG EC tablet Take 81 mg by mouth once daily.      atorvastatin (LIPITOR) 80 MG tablet Take 1 tablet (80 mg total) by mouth once daily. 90 tablet 3    ergocalciferol (ERGOCALCIFEROL) 50,000 unit Cap ergocalciferol (vitamin D2) 1,250 mcg (50,000 unit) capsule   TAKE 1 CAPSULE BY MOUTH ONCE A WEEK      gabapentin (NEURONTIN) 300 MG capsule Take 1 capsule (300 mg total) by mouth 3 (three) times daily. 90 capsule 11    hydrOXYzine pamoate (VISTARIL) 50 MG Cap Take 50 mg by mouth every evening.      insulin aspart protamine-insulin aspart (NOVOLOG MIX 70-30FLEXPEN U-100) 100 unit/mL (70-30) InPn pen 45 u  sq BID. Dispense  6  boxes 6 each 0    losartan (COZAAR) 100 MG tablet TAKE 1 TABLET BY MOUTH ONCE DAILY 90 tablet 1    metFORMIN (GLUCOPHAGE) 1000 MG tablet Take 1 tablet (1,000 mg total) by mouth daily with breakfast. 90 tablet 0    nabumetone (RELAFEN) 500 MG tablet Take 1 tablet (500 mg total) by mouth 2 (two) times daily. 30 tablet 0    omeprazole (PRILOSEC) 40 MG capsule Take 1 capsule (40 mg total) by mouth once daily. 90 capsule 3    pen needle, diabetic (BD BRANT 2ND GEN PEN NEEDLE) 32 gauge x 5/32" Ndle BD Brant 2nd Gen Pen Needle 32 gauge x 5/32"  USE TO INJECT INSULIN TWICE DAILY 180 each 1    TRUE METRIX GLUCOSE TEST STRIP Strp Check glucose  strip 1     No current facility-administered medications on file prior to visit.      Medication Reaction  This is a " new problem. The current episode started 1 to 4 weeks ago. The problem occurs daily. The problem has been unchanged. Associated symptoms include a rash. She has tried nothing for the symptoms.     Review of Systems   Constitutional: Negative.    HENT: Negative.     Respiratory: Negative.     Gastrointestinal: Negative.    Genitourinary: Negative.    Skin:  Positive for rash.       Objective:      Physical Exam  Vitals reviewed.   Constitutional:       Appearance: She is well-developed.   HENT:      Head: Normocephalic.      Mouth/Throat:      Pharynx: No oropharyngeal exudate.   Eyes:      Pupils: Pupils are equal, round, and reactive to light.   Neck:      Vascular: No JVD.      Trachea: No tracheal deviation.   Cardiovascular:      Rate and Rhythm: Normal rate and regular rhythm.      Heart sounds: Normal heart sounds. No murmur heard.  Pulmonary:      Effort: Pulmonary effort is normal. No respiratory distress.      Breath sounds: Normal breath sounds.   Abdominal:      General: Bowel sounds are normal. There is no distension.      Palpations: Abdomen is soft.      Tenderness: There is no abdominal tenderness.   Musculoskeletal:         General: Normal range of motion.      Cervical back: Normal range of motion.   Skin:     General: Skin is warm and dry.   Neurological:      Mental Status: She is alert and oriented to person, place, and time.      Deep Tendon Reflexes: Reflexes are normal and symmetric.   Psychiatric:         Speech: Speech normal.         Behavior: Behavior normal.         Assessment:       1. Allergic reaction to drug, initial encounter        Plan:   Allergic reaction to drug, initial encounter  -     methylPREDNISolone acetate injection 80 mg  -     loratadine (CLARITIN) 10 mg tablet; Take 1 tablet (10 mg total) by mouth once daily. for 7 days  Dispense: 7 tablet; Refill: 0  -     famotidine (PEPCID) 20 MG tablet; Take 1 tablet (20 mg total) by mouth 2 (two) times daily. for 7 days  Dispense:  14 tablet; Refill: 0    -no visible rash or swelling on exam.  Stop gabapenting.    No follow-ups on file.

## 2024-04-18 PROCEDURE — 82274 ASSAY TEST FOR BLOOD FECAL: CPT | Performed by: FAMILY MEDICINE

## 2024-05-01 ENCOUNTER — TELEPHONE (OUTPATIENT)
Dept: PAIN MEDICINE | Facility: CLINIC | Age: 69
End: 2024-05-01
Payer: MEDICARE

## 2024-05-02 ENCOUNTER — OFFICE VISIT (OUTPATIENT)
Dept: FAMILY MEDICINE | Facility: CLINIC | Age: 69
End: 2024-05-02
Attending: FAMILY MEDICINE
Payer: MEDICARE

## 2024-05-02 ENCOUNTER — HOSPITAL ENCOUNTER (OUTPATIENT)
Dept: RADIOLOGY | Facility: HOSPITAL | Age: 69
Discharge: HOME | End: 2024-05-02
Attending: FAMILY MEDICINE
Payer: MEDICARE

## 2024-05-02 VITALS
WEIGHT: 155 LBS | SYSTOLIC BLOOD PRESSURE: 122 MMHG | DIASTOLIC BLOOD PRESSURE: 80 MMHG | BODY MASS INDEX: 30.43 KG/M2 | TEMPERATURE: 99 F | HEIGHT: 60 IN | HEART RATE: 85 BPM | OXYGEN SATURATION: 98 %

## 2024-05-02 DIAGNOSIS — L29.9 PRURITUS: Primary | ICD-10-CM

## 2024-05-02 DIAGNOSIS — L29.9 PRURITUS: ICD-10-CM

## 2024-05-02 DIAGNOSIS — T78.40XA ALLERGIC REACTION TO DRUG, INITIAL ENCOUNTER: ICD-10-CM

## 2024-05-02 PROCEDURE — 99214 OFFICE O/P EST MOD 30 MIN: CPT | Mod: S$PBB,,, | Performed by: FAMILY MEDICINE

## 2024-05-02 PROCEDURE — 71046 X-RAY EXAM CHEST 2 VIEWS: CPT | Mod: TC,PO

## 2024-05-02 PROCEDURE — 71046 X-RAY EXAM CHEST 2 VIEWS: CPT | Mod: 26,,, | Performed by: RADIOLOGY

## 2024-05-02 PROCEDURE — 99214 OFFICE O/P EST MOD 30 MIN: CPT | Mod: PBBFAC,PO | Performed by: FAMILY MEDICINE

## 2024-05-02 PROCEDURE — 99999 PR PBB SHADOW E&M-EST. PATIENT-LVL IV: CPT | Mod: PBBFAC,,, | Performed by: FAMILY MEDICINE

## 2024-05-02 RX ORDER — PREDNISONE 20 MG/1
TABLET ORAL
Qty: 20 TABLET | Refills: 0 | Status: SHIPPED | OUTPATIENT
Start: 2024-05-02

## 2024-05-02 RX ORDER — LORATADINE 10 MG/1
10 TABLET ORAL DAILY
Qty: 30 TABLET | Refills: 1 | Status: SHIPPED | OUTPATIENT
Start: 2024-05-02 | End: 2024-05-09

## 2024-05-02 RX ORDER — EPINEPHRINE 0.3 MG/.3ML
1 INJECTION SUBCUTANEOUS ONCE
Qty: 1 EACH | Refills: 0 | Status: SHIPPED | OUTPATIENT
Start: 2024-05-02 | End: 2024-05-02

## 2024-05-02 RX ORDER — FAMOTIDINE 20 MG/1
20 TABLET, FILM COATED ORAL 2 TIMES DAILY
Qty: 14 TABLET | Refills: 0 | Status: SHIPPED | OUTPATIENT
Start: 2024-05-02 | End: 2024-05-09

## 2024-05-02 NOTE — PROGRESS NOTES
"Subjective:       Patient ID: Rachelle Philip is a 68 y.o. female.    Chief Complaint: Hand Problem (Itching to both hands)    68 y old female here to discuss recurrent pruritus and angioedema . Evaluated last month . Pruritus occur mainly on palms and soles . It was presumed to be caused by gabapentin then  since it started 10 min after taking gabapentin . She was prescribed famotidine , methylprednisolone and loratadine . She was fine until last night . She started developing pruritus , lips edema ,eyelid edema and her throat felt " swollen" . She has been eating her normal diet, no changes in detergents , cosmetic products etc  . No sick contacts , no pets . She lives by herself .     Review of Systems   Constitutional: Negative.    HENT: Negative.     Eyes: Negative.    Respiratory: Negative.     Cardiovascular: Negative.    Gastrointestinal: Negative.    Genitourinary: Negative.    Musculoskeletal: Negative.    Skin: Negative.    Hematological: Negative.        Objective:      Physical Exam  Constitutional:       General: She is not in acute distress.     Appearance: She is well-developed. She is not diaphoretic.   HENT:      Head: Normocephalic and atraumatic.      Right Ear: External ear normal.      Left Ear: External ear normal.      Mouth/Throat:      Pharynx: No oropharyngeal exudate.   Eyes:      General: No scleral icterus.        Right eye: No discharge.         Left eye: No discharge.      Conjunctiva/sclera: Conjunctivae normal.      Pupils: Pupils are equal, round, and reactive to light.   Neck:      Thyroid: No thyromegaly.      Vascular: No JVD.      Trachea: No tracheal deviation.   Cardiovascular:      Rate and Rhythm: Normal rate and regular rhythm.      Heart sounds: Normal heart sounds. No murmur heard.     No friction rub. No gallop.   Pulmonary:      Effort: Pulmonary effort is normal. No respiratory distress.      Breath sounds: Normal breath sounds. No stridor. No wheezing or rales.   Chest: "      Chest wall: No tenderness.   Abdominal:      General: Bowel sounds are normal. There is no distension.      Palpations: Abdomen is soft.      Tenderness: There is no abdominal tenderness. There is no guarding or rebound.   Musculoskeletal:         General: Normal range of motion.      Cervical back: Normal range of motion and neck supple.   Lymphadenopathy:      Cervical: No cervical adenopathy.   Skin:     General: Skin is warm and dry.   Neurological:      Mental Status: She is alert and oriented to person, place, and time.   Psychiatric:         Behavior: Behavior normal.         Thought Content: Thought content normal.         Judgment: Judgment normal.       Assessment:       1. Pruritus    2. Allergic reaction to drug, initial encounter        Plan:     Rachelle was seen today for hand problem.    Diagnoses and all orders for this visit:    Pruritus  -     CBC Auto Differential; Future  -     Comprehensive Metabolic Panel; Future  -     TSH; Future  -     X-Ray Chest PA And Lateral; Future    Allergic reaction to drug, initial encounter  -     famotidine (PEPCID) 20 MG tablet; Take 1 tablet (20 mg total) by mouth 2 (two) times daily. for 7 days  -     loratadine (CLARITIN) 10 mg tablet; Take 1 tablet (10 mg total) by mouth once daily. for 7 days    Other orders  -     predniSONE (DELTASONE) 20 MG tablet; Take 3 pills daily for 3 days, then 2 pills daily for 3 days, then 1 pill daily for 3 days, then 1/2 pill daily for 4 days.  -     EPINEPHrine (EPIPEN 2-BUBBA) 0.3 mg/0.3 mL AtIn; Inject 0.3 mLs (0.3 mg total) into the muscle once. for 1 dose     WS discussed.   We will continue to follow up .   Monitor Glucose and BP while taking prednisone

## 2024-05-06 NOTE — PROGRESS NOTES
Subjective:       Patient ID: Rachelle Philip is a 68 y.o. female.    Chief Complaint: Back Pain  Pt reports to clinic with chief complaint of severe back pain and knee pain.  Chronic issue.  No remote injury or trauma.  Notes paresthesia.  No relief with tylenol.      Past Medical History:   Diagnosis Date    Diabetes mellitus     Hypertension       Current Outpatient Medications on File Prior to Visit   Medication Sig Dispense Refill    aspirin (ECOTRIN) 81 MG EC tablet Take 81 mg by mouth once daily.      ergocalciferol (ERGOCALCIFEROL) 50,000 unit Cap ergocalciferol (vitamin D2) 1,250 mcg (50,000 unit) capsule   TAKE 1 CAPSULE BY MOUTH ONCE A WEEK      metFORMIN (GLUCOPHAGE) 1000 MG tablet Take 1 tablet (1,000 mg total) by mouth daily with breakfast. 90 tablet 0     No current facility-administered medications on file prior to visit.      Back Pain      Review of Systems   Constitutional: Negative.    HENT: Negative.     Respiratory: Negative.     Cardiovascular: Negative.    Gastrointestinal: Negative.    Genitourinary: Negative.    Musculoskeletal:  Positive for arthralgias, back pain and myalgias.   Skin: Negative.        Objective:      Physical Exam  Vitals reviewed.   HENT:      Head: Normocephalic.      Right Ear: External ear normal.      Left Ear: External ear normal.      Mouth/Throat:      Mouth: Mucous membranes are dry.   Cardiovascular:      Rate and Rhythm: Normal rate.      Heart sounds: Normal heart sounds.   Pulmonary:      Effort: Pulmonary effort is normal. No respiratory distress.   Musculoskeletal:      Cervical back: Normal range of motion.      Lumbar back: No bony tenderness. Decreased range of motion. Negative right straight leg raise test and negative left straight leg raise test.   Skin:     Coloration: Skin is not jaundiced.   Neurological:      Mental Status: She is alert and oriented to person, place, and time.         Assessment:       1. Lumbar radiculopathy    2. Acute pain of right  knee    3. Thrombocytosis    4. Insomnia, unspecified type        Plan:   Lumbar radiculopathy    Acute pain of right knee  -     X-ray Knee Ortho Right; Future; Expected date: 11/30/2023  -     diclofenac (VOLTAREN) 75 MG EC tablet; Take 1 tablet (75 mg total) by mouth 2 (two) times daily. for 10 days  Dispense: 20 tablet; Refill: 0  -     HYDROcodone-acetaminophen (NORCO) 5-325 mg per tablet; Take 1 tablet by mouth every 6 (six) hours as needed for Pain.  Dispense: 20 tablet; Refill: 0    Thrombocytosis  -     CBC Auto Differential; Future; Expected date: 11/30/2023    Insomnia, unspecified type      No follow-ups on file.

## 2024-05-22 NOTE — PROGRESS NOTES
Chronic Pain -- Established Patient (Follow-up visit)    Referring Physician: Faina Peacock MD    PCP: Faina Peacock MD    Chief Complaint:   Chief Complaint   Patient presents with    Low-back Pain     Patient has pain in lower back right side from buttock down right leg.  Pain level 3/10        SUBJECTIVE:    Interval History (5/23/2024): Kamari  #[id number] present for today's visit. Patient presents today for follow-up visit.  she underwent Right L4/5 + L5/S1 TF DIEGO on 4/3/24 (about 7 weeks ago).  The patient reports that she is/was better following the procedure.  she reports 50% pain relief.  The changes lasted 4 weeks so far.  The changes have continued through this visit.  Patient reports pain as 3/10 today.  She could not tolerate gabapentin due to itching.  She took it for about 3 weeks, and she also reports swollen eyes and mouth.    3/20/2024: Follow Up Visit:  Rachelle is here for a f/u visit after the lumbar spine MRI.  The results were discussed with her and are detailed below.  I also did independent interpretation of the films.  She has chronic low back pain and right leg radicular pain over L4 and L5 dermatomes.  We also ordered physical therapy, and started her on nabumetone and gabapentin.  She has done 6 weeks of therapy so far and it was helping.  The medications are helping very little.  No side effects.  The pain continues to get worse with prolonged walking and standing.  She has not been able to work for the last 3 months.  She has a personal caregiver.    02/21/2024 Initial visit: Interview today was conducted through an .  Rachelle Philip is a 68 y.o. female who presents to the clinic for the evaluation of chronic low back pain (5 years) that has gotten severe over the past 3 years.  No triggering events.  It radiates to the right buttock, outer thigh, outer leg and in her leg to the ankle (L4/L5 dermatomes).  Reports intermittent tingling and  numbness to the right foot and intermittent weakness in the right leg.  Denies falls or loss of bowel or bladder control.  The pain is worse with prolonged walking and standing and better with rest.  Exercise helps a little.  She currently rates it at 7/10 and is worst at 10/10 and best at 10/10.  She also has chronic neck pain that is axial and that has gotten worse over the past 3 years.  Her lower back pain is worse than the neck pain.  It is affecting her ADLs.  Patient denies night fever/night sweats.    Treatment History:  Temporary relief with Tylenol.  Partial relief with the exercises and physical therapy.  No relief with meloxicam.  Most recent PT for her knee but not specifically for the back.    Radiological workup:  Last MRI of the lumbar spine was in 2018; 2024.      Pain Disability Index (PDI) Score Review:      5/23/2024    10:17 AM 3/20/2024     9:57 AM   Last 3 PDI Scores   Pain Disability Index (PDI) 21 41       Pain Injections:  - Right L4/5 + L5/S1 TF DIEGO on 4/3/24 with 50% pain relief        Review of Systems:  GENERAL:  No weight loss, malaise or fevers.  HEENT:   No recent changes in vision or hearing  NECK:  Negative for lumps, no difficulty with swallowing.  RESPIRATORY:  Negative for cough, wheezing or shortness of breath, patient denies any recent URI.  CARDIOVASCULAR:  Negative for chest pain, leg swelling or palpitations.  GI:  Negative for abdominal discomfort, blood in stools or black stools or change in bowel habits.  MUSCULOSKELETAL:  See HPI.  SKIN:  Negative for lesions, rash, and itching.  PSYCH:  No mood disorder or recent psychosocial stressors.  Patients sleep is not disturbed secondary to pain.  HEMATOLOGY/LYMPHOLOGY:  Negative for prolonged bleeding, bruising easily or swollen nodes.  Patient is not currently taking any anti-coagulants  NEURO:   No history of headaches, syncope, paralysis, seizures or tremors.  All other reviewed and negative other than  HPI.        OBJECTIVE:    Physical Exam:  Vitals:    05/23/24 1016   BP: 137/73   Pulse: 85   Resp: 18   Weight: 70.7 kg (155 lb 13.8 oz)   Height: 5' (1.524 m)   PainSc:   3    Body mass index is 30.44 kg/m².   (reviewed on 5/23/2024)    GENERAL: Well appearing, in no acute distress, alert and oriented x3.  PSYCH:  Mood and affect appropriate.  SKIN: Skin color, texture, turgor normal, no rashes or lesions.  HEAD/FACE:  Normocephalic, atraumatic.  CV: No edema.  PULM: No evidence of respiratory difficulty, symmetric chest rise.    GAIT: slow    LUMBAR SPINE:   Palpation: Tenderness over bilateral facet joints and paraspinous muscles.   ROM: Pain with extension and rotation.  Straight leg raising is positive on the right.  SI JOINTS: bilateral SI joints, no tenderness, negative Donny's  HIPS: normal and nonpainful ROM of both hip joints.    NEURO:   MOTOR: Bilateral upper and lower extremity muscle strength is normal. No atrophy or tone abnormalities are noted.  SENSORY: No loss of sensation in C4 through T1 dermatomes bilaterally, and in L3 through S1 dermatomes except for diminished pinprick sensation over the right S1 dermatome.  DTR's: Reflexes are physiologic and symmetric in upper and lower extremities except for weak right knee reflex. No clonus.  Negative Tovar's bilaterally.         Imaging (Reviewed on 5/23/2024):    Lumbar spine MRI on 02/21/2024:    1. Severe bilateral facet arthropathy with grade 1 anterolisthesis of L3 on L4 and L4 on L5.  2. Severe central canal stenosis at L4/5 and complete obliteration of the right lateral recess with impingement upon the descending right L5 nerve root in its lateral recess, and severe right foraminal stenosis with impingement upon the right L4 nerve root.  3. L3/4 moderate central canal stenosis.  Mild right and moderate left foraminal stenosis.  4. Moderate left L2-L3 neural foraminal stenosis.    Lumbar spine MRI  06/26/18   Grade 1 spondylolisthesis at the  L3-4 and L4-5 levels. L5 is a transitional vertebra.    Bilateral L3/4 bony neural foraminal narrowing with possible but not definite L3 nerve root impingements.  L4-L5: right-sided neural foraminal narrowing and right L4 nerve root impingement. Moderate central spinal stenosis.                  ASSESSMENT: 68 y.o. year old female with:    1. Right lumbar radiculopathy  pregabalin (LYRICA) 75 MG capsule      2. DDD (degenerative disc disease), lumbar  meloxicam (MOBIC) 7.5 MG tablet      3. Neurogenic claudication due to lumbar spinal stenosis        4. Lumbar radiculitis  ketorolac injection 30 mg            PLAN:   1. Interventional:   - S/p Right L4/5 + L5/S1 TF DIEGO on 4/3/24 with 50% pain relief   - Consider repeat DIEGO.     - Procedure note: An IM injection of (ketolorac 30mg/1mL) was administered during clinic visit by our medical assistant.  This was well tolerated.     2. Pharmacologic:   - Start Lyrica 75mg BID.  Consider further Increase if no improvement after 1-3 weeks.  Patient informed not to stop taking if she does not find significant pain relief. We previously discussed potential side effects of this medication, which may include drowsiness,dizziness, dry mouth, constipation, or peripheral edema. D/c gabapentin (Neurontin) 300mg - could not tolerate due to itching. Patient informed she may have same reaction to Lyrica. If so, will try Topamax.  - Start Mobic 7.5mg BID PRN pain. she denies any kidney or cardiac issues. Take with food.  Avoid other OTC NSAIDs (ex. Ibuprofen, naproxen) while taking this medication. No relief with Relafen in the past.   - Anticoagulation use: ASA.    3. Rehabilitative: Encouraged regular exercise. Continue exercises and activities as tolerated. She is to finish physical therapy and to continue home exercises.    4. Diagnostic: Updated MRI reviewed previously: severe right L4-5 lateral recess and foraminal stenosis from disc bulging and facet hypertrophy with significant  impingement of the right L4 and L5 nerve roots (and only moderate central spinal stenosis, although radiologist report described it as severe).    5. Other: Previously noted: given note stating that she we will not be able to return to work before 4 weeks.  She has a personal caregiver.    6. Follow up: 6 weeks - in clinic - with     - This condition does not require this patient to take time off of work, and the primary goal of our Pain Management services is to improve the patient's functional capacity.   - I discussed the risks, benefits, and alternatives to potential treatment options. All questions and concerns were fully addressed today in clinic.         Adilia Santiago PA-C  Interventional Pain Management - [facility]    Disclaimer:  This note was prepared using voice recognition system and is likely to have sound alike errors that may have been overlooked even after proof reading.  Please call me with any questions.

## 2024-05-23 ENCOUNTER — OFFICE VISIT (OUTPATIENT)
Dept: PAIN MEDICINE | Facility: CLINIC | Age: 69
End: 2024-05-23
Payer: MEDICARE

## 2024-05-23 VITALS
RESPIRATION RATE: 18 BRPM | SYSTOLIC BLOOD PRESSURE: 137 MMHG | WEIGHT: 155.88 LBS | HEIGHT: 60 IN | HEART RATE: 85 BPM | BODY MASS INDEX: 30.61 KG/M2 | DIASTOLIC BLOOD PRESSURE: 73 MMHG

## 2024-05-23 DIAGNOSIS — M51.36 DDD (DEGENERATIVE DISC DISEASE), LUMBAR: ICD-10-CM

## 2024-05-23 DIAGNOSIS — M54.16 LUMBAR RADICULITIS: ICD-10-CM

## 2024-05-23 DIAGNOSIS — M54.16 RIGHT LUMBAR RADICULOPATHY: Primary | ICD-10-CM

## 2024-05-23 DIAGNOSIS — M48.062 NEUROGENIC CLAUDICATION DUE TO LUMBAR SPINAL STENOSIS: ICD-10-CM

## 2024-05-23 PROCEDURE — 99999PBSHW PR PBB SHADOW TECHNICAL ONLY FILED TO HB: Mod: PBBFAC,,,

## 2024-05-23 PROCEDURE — 96372 THER/PROPH/DIAG INJ SC/IM: CPT | Mod: PBBFAC

## 2024-05-23 PROCEDURE — 99214 OFFICE O/P EST MOD 30 MIN: CPT | Mod: PBBFAC | Performed by: PHYSICIAN ASSISTANT

## 2024-05-23 PROCEDURE — 99214 OFFICE O/P EST MOD 30 MIN: CPT | Mod: S$PBB,,, | Performed by: PHYSICIAN ASSISTANT

## 2024-05-23 PROCEDURE — 99999 PR PBB SHADOW E&M-EST. PATIENT-LVL IV: CPT | Mod: PBBFAC,,, | Performed by: PHYSICIAN ASSISTANT

## 2024-05-23 RX ORDER — KETOROLAC TROMETHAMINE 30 MG/ML
30 INJECTION, SOLUTION INTRAMUSCULAR; INTRAVENOUS
Status: COMPLETED | OUTPATIENT
Start: 2024-05-23 | End: 2024-05-23

## 2024-05-23 RX ORDER — MELOXICAM 7.5 MG/1
7.5 TABLET ORAL 2 TIMES DAILY PRN
Qty: 60 TABLET | Refills: 0 | Status: SHIPPED | OUTPATIENT
Start: 2024-05-23

## 2024-05-23 RX ORDER — PREGABALIN 75 MG/1
75 CAPSULE ORAL 2 TIMES DAILY
Qty: 60 CAPSULE | Refills: 1 | Status: SHIPPED | OUTPATIENT
Start: 2024-05-23

## 2024-05-23 RX ADMIN — KETOROLAC TROMETHAMINE 30 MG: 30 INJECTION, SOLUTION INTRAMUSCULAR; INTRAVENOUS at 10:05

## 2024-06-03 ENCOUNTER — TELEPHONE (OUTPATIENT)
Dept: PAIN MEDICINE | Facility: CLINIC | Age: 69
End: 2024-06-03
Payer: MEDICARE

## 2024-06-03 NOTE — TELEPHONE ENCOUNTER
----- Message from Filomena Carter sent at 6/3/2024 11:25 AM CDT -----  Contact: Rachelle Bush would like a call back at [phone] in regards to needing to speak with nurse about rescheduling her appointment that's for Wednesday 6/5/24. Patient has to work so she wont be able to make it. I attempted to reschedule, the first available was 7/31/24. Patient declined and would like the nurse to work her in for something sooner. (Will need an  for call)  Thanks   Am

## 2024-06-03 NOTE — TELEPHONE ENCOUNTER
----- Message from Crystal Ashley sent at 6/3/2024  4:11 PM CDT -----  Type: Appointment Request     Name of Caller: SHANIQUA BARNES [[id number]]    When is the first available appointment? Do not have access    Reason for Visit:  6 wks f/u     Best Call Back Number: [phone]    Additional Information: Pt would like to reschedule her appt on 6/10, 6/13 or 6/14 if possible due to work. Need      Thank you!

## 2024-06-03 NOTE — TELEPHONE ENCOUNTER
Called patient in regards to rescheduling upcoming appointment. Patient speaks [place] so I transferred her over to the  line to relay message that there will be an opening at 9:00AM on 06/04/24. Patient did not  phone for  so  left a message for patient to give a call back.    Claudia Delong MA

## 2024-06-03 NOTE — TELEPHONE ENCOUNTER
Called pt and rescheduled her appt to a to 07/31/2024. Pt verbalized understanding.    Marcell KELLY

## 2024-06-07 ENCOUNTER — OFFICE VISIT (OUTPATIENT)
Dept: OPHTHALMOLOGY | Facility: CLINIC | Age: 69
End: 2024-06-07
Payer: MEDICARE

## 2024-06-07 DIAGNOSIS — H52.203 ASTIGMATISM WITH PRESBYOPIA, BILATERAL: ICD-10-CM

## 2024-06-07 DIAGNOSIS — H52.4 ASTIGMATISM WITH PRESBYOPIA, BILATERAL: ICD-10-CM

## 2024-06-07 DIAGNOSIS — Z79.4 TYPE 2 DIABETES MELLITUS WITHOUT COMPLICATION, WITH LONG-TERM CURRENT USE OF INSULIN: ICD-10-CM

## 2024-06-07 DIAGNOSIS — H04.129 DRY EYE: ICD-10-CM

## 2024-06-07 DIAGNOSIS — E11.9 TYPE 2 DIABETES MELLITUS WITHOUT COMPLICATION, WITH LONG-TERM CURRENT USE OF INSULIN: ICD-10-CM

## 2024-06-07 DIAGNOSIS — E11.36 DIABETIC CATARACT OF BOTH EYES: ICD-10-CM

## 2024-06-07 DIAGNOSIS — H25.13 NUCLEAR SCLEROSIS, BILATERAL: ICD-10-CM

## 2024-06-07 DIAGNOSIS — E11.9 TYPE 2 DIABETES MELLITUS WITHOUT RETINOPATHY: Primary | ICD-10-CM

## 2024-06-07 PROCEDURE — 92015 DETERMINE REFRACTIVE STATE: CPT | Mod: ,,, | Performed by: OPTOMETRIST

## 2024-06-07 PROCEDURE — 99999 PR PBB SHADOW E&M-EST. PATIENT-LVL II: CPT | Mod: PBBFAC,,, | Performed by: OPTOMETRIST

## 2024-06-07 PROCEDURE — 99204 OFFICE O/P NEW MOD 45 MIN: CPT | Mod: S$PBB,,, | Performed by: OPTOMETRIST

## 2024-06-07 PROCEDURE — 99212 OFFICE O/P EST SF 10 MIN: CPT | Mod: PBBFAC | Performed by: OPTOMETRIST

## 2024-06-07 RX ORDER — LOTEPREDNOL ETABONATE 5 MG/G
GEL OPHTHALMIC
Qty: 5 G | Refills: 0 | Status: SHIPPED | OUTPATIENT
Start: 2024-06-07 | End: 2024-06-21

## 2024-06-07 NOTE — PROGRESS NOTES
HPI     Annual Exam            Comments: Pt reports for annual exam. Denies any pain or irritation. Va   blurry. Pt states she has trouble reading due to PAL measured incorrectly.   No drops.          Last edited by Guanaco Kay on 6/7/2024  9:27 AM.            Assessment /Plan     For exam results, see Encounter Report.    Type 2 diabetes mellitus without retinopathy  There was no diabetic retinopathy present in either eye today.   Recommended that pt continue care with PCP and/or specialists regarding diabetes.  Follow-up dilated eye exam recommended in 12 months, sooner with any vision changes or new concerns.    Nuclear sclerosis, bilateral  Diabetic cataract of both eyes  Dry eye    Mild-mod cat OU  Does not appear to be 20/40-20/50cat  Normal OCT  Suspect dryness causing decreased BCVA  Recheck undilated  Start lotemax      -     loteprednol etabonate (LOTEMAX) 0.5 % DrpG; Place 1 drop into both eyes 4 (four) times daily for 7 days, THEN 1 drop 2 (two) times daily for 7 days.  Dispense: 5 g; Refill: 0  Start Lotemax qid OU x 7 days, then bid OU until next visit  Recommended iVizia bid OU    Astigmatism with presbyopia, bilateral  Hold spec Rx due to dryness    Normal gOCT today with nice, symmetric GCL OU      RTC 2 weeks for dry eye follow up and refraction (check near vision) or PRN  Discussed above and all questions were answered.

## 2024-06-21 ENCOUNTER — OFFICE VISIT (OUTPATIENT)
Dept: OPHTHALMOLOGY | Facility: CLINIC | Age: 69
End: 2024-06-21
Payer: MEDICARE

## 2024-06-21 DIAGNOSIS — H52.4 ASTIGMATISM WITH PRESBYOPIA, BILATERAL: ICD-10-CM

## 2024-06-21 DIAGNOSIS — H52.203 ASTIGMATISM WITH PRESBYOPIA, BILATERAL: ICD-10-CM

## 2024-06-21 DIAGNOSIS — H04.129 DRY EYE: Primary | ICD-10-CM

## 2024-06-21 PROCEDURE — 99213 OFFICE O/P EST LOW 20 MIN: CPT | Mod: PBBFAC | Performed by: OPTOMETRIST

## 2024-06-21 PROCEDURE — 99999 PR PBB SHADOW E&M-EST. PATIENT-LVL III: CPT | Mod: PBBFAC,,, | Performed by: OPTOMETRIST

## 2024-06-21 NOTE — PROGRESS NOTES
HPI     Follow-up            Comments: Pt reports for dry eye f/u and dry refraction, no pain or   irritations          Last edited by Sravani Connell MA on 6/21/2024 10:40 AM.            Assessment /Plan     For exam results, see Encounter Report.    Dry eye  Improved bcva today  No staining OD, OS  Start iVizia bid OU  Ok to d/c lotemax    Pt va c/o mainly for near: increased add, expect better near va for reading  If not better, will get cat consult    Astigmatism with presbyopia, bilateral  Eyeglass Final Rx       Eyeglass Final Rx         Sphere Cylinder Axis Add    Right [place]ow +1.75 180 +3.00    Left +0.50 +1.50 180 +3.00      Expiration Date: 6/21/2025                    RTC 1 yr for dilated eye exam or PRN if any problems.   Discussed above and answered questions.

## 2024-06-30 DIAGNOSIS — T78.40XA ALLERGIC REACTION TO DRUG, INITIAL ENCOUNTER: ICD-10-CM

## 2024-06-30 NOTE — TELEPHONE ENCOUNTER
Refill Routing Note   Medication(s) are not appropriate for processing by [facility] for the following reason(s):        New or recently adjusted medication    [facility] action(s):  Defer     Requires labs : Yes             Appointments  past 12m or future 3m with PCP    Date Provider   Last Visit   5/2/2024 Faina Peacock MD   Next Visit   7/2/2024 Faina Peacock MD   ED visits in past 90 days: 0        Note composed:9:47 AM 06/30/2024

## 2024-06-30 NOTE — TELEPHONE ENCOUNTER
Care Due:                  Date            Visit Type   Department     Provider  --------------------------------------------------------------------------------                                MYCHART                              FOLLOWUP/OF  [facility] INTERNAL  Faina GARCIA  Last Visit: 05-      [facility] VISIT   MEDICINE       Ayush  Next Visit: None Scheduled  None         None Found                                                            Last  Test          Frequency    Reason                     Performed    Due Date  --------------------------------------------------------------------------------    HBA1C.......  6 months...  insulin, metFORMIN.......  03- 08-    [facility] Embedded Care Due Messages. Reference number: [id number].   6/30/2024 7:02:22 AM CDT

## 2024-07-01 RX ORDER — LORATADINE 10 MG/1
10 TABLET ORAL
Qty: 90 TABLET | Refills: 3 | Status: SHIPPED | OUTPATIENT
Start: 2024-07-01

## 2024-07-02 ENCOUNTER — OFFICE VISIT (OUTPATIENT)
Dept: FAMILY MEDICINE | Facility: CLINIC | Age: 69
End: 2024-07-02
Attending: FAMILY MEDICINE
Payer: MEDICARE

## 2024-07-02 ENCOUNTER — LAB VISIT (OUTPATIENT)
Dept: LAB | Facility: HOSPITAL | Age: 69
End: 2024-07-02
Attending: FAMILY MEDICINE
Payer: MEDICARE

## 2024-07-02 VITALS
BODY MASS INDEX: 31.18 KG/M2 | WEIGHT: 158.81 LBS | TEMPERATURE: 96 F | OXYGEN SATURATION: 97 % | HEART RATE: 74 BPM | DIASTOLIC BLOOD PRESSURE: 80 MMHG | SYSTOLIC BLOOD PRESSURE: 130 MMHG | HEIGHT: 60 IN

## 2024-07-02 DIAGNOSIS — E11.69 DM TYPE 2 WITH DIABETIC DYSLIPIDEMIA: ICD-10-CM

## 2024-07-02 DIAGNOSIS — M54.2 NECK PAIN: ICD-10-CM

## 2024-07-02 DIAGNOSIS — G47.00 INSOMNIA, UNSPECIFIED TYPE: ICD-10-CM

## 2024-07-02 DIAGNOSIS — I10 HYPERTENSION, UNSPECIFIED TYPE: ICD-10-CM

## 2024-07-02 DIAGNOSIS — E78.5 DM TYPE 2 WITH DIABETIC DYSLIPIDEMIA: ICD-10-CM

## 2024-07-02 DIAGNOSIS — E11.69 DM TYPE 2 WITH DIABETIC DYSLIPIDEMIA: Primary | ICD-10-CM

## 2024-07-02 DIAGNOSIS — E78.5 DM TYPE 2 WITH DIABETIC DYSLIPIDEMIA: Primary | ICD-10-CM

## 2024-07-02 PROBLEM — R26.9 GAIT ABNORMALITY: Status: RESOLVED | Noted: 2024-02-05 | Resolved: 2024-07-02

## 2024-07-02 PROBLEM — R53.1 DECREASED RANGE OF MOTION WITH DECREASED STRENGTH: Status: RESOLVED | Noted: 2024-02-05 | Resolved: 2024-07-02

## 2024-07-02 PROBLEM — R52 PAIN: Status: RESOLVED | Noted: 2024-02-05 | Resolved: 2024-07-02

## 2024-07-02 PROBLEM — M25.60 DECREASED RANGE OF MOTION WITH DECREASED STRENGTH: Status: RESOLVED | Noted: 2024-02-05 | Resolved: 2024-07-02

## 2024-07-02 PROBLEM — R68.89 DECREASED FUNCTIONAL ACTIVITY TOLERANCE: Status: RESOLVED | Noted: 2024-02-05 | Resolved: 2024-07-02

## 2024-07-02 LAB
ALBUMIN SERPL BCP-MCNC: 3.7 G/DL (ref 3.5–5.2)
ALP SERPL-CCNC: 78 U/L (ref 55–135)
ALT SERPL W/O P-5'-P-CCNC: 21 U/L (ref 10–44)
ANION GAP SERPL CALC-SCNC: 12 MMOL/L (ref 8–16)
AST SERPL-CCNC: 21 U/L (ref 10–40)
BASOPHILS # BLD AUTO: 0.08 K/UL (ref 0–0.2)
BASOPHILS NFR BLD: 1.3 % (ref 0–1.9)
BILIRUB SERPL-MCNC: 0.3 MG/DL (ref 0.1–1)
BUN SERPL-MCNC: 19 MG/DL (ref 8–23)
CALCIUM SERPL-MCNC: 9.6 MG/DL (ref 8.7–10.5)
CHLORIDE SERPL-SCNC: 104 MMOL/L (ref 95–110)
CHOLEST SERPL-MCNC: 325 MG/DL (ref 120–199)
CHOLEST/HDLC SERPL: 7.7 {RATIO} (ref 2–5)
CO2 SERPL-SCNC: 26 MMOL/L (ref 23–29)
CREAT SERPL-MCNC: 0.9 MG/DL (ref 0.5–1.4)
DIFFERENTIAL METHOD BLD: NORMAL
EOSINOPHIL # BLD AUTO: 0.2 K/UL (ref 0–0.5)
EOSINOPHIL NFR BLD: 3.2 % (ref 0–8)
ERYTHROCYTE [DISTWIDTH] IN BLOOD BY AUTOMATED COUNT: 12.3 % (ref 11.5–14.5)
EST. GFR  (NO RACE VARIABLE): >60 ML/MIN/1.73 M^2
ESTIMATED AVG GLUCOSE: 163 MG/DL (ref 68–131)
GLUCOSE SERPL-MCNC: 146 MG/DL (ref 70–110)
HBA1C MFR BLD: 7.3 % (ref 4–5.6)
HCT VFR BLD AUTO: 37.5 % (ref 37–48.5)
HDLC SERPL-MCNC: 42 MG/DL (ref 40–75)
HDLC SERPL: 12.9 % (ref 20–50)
HGB BLD-MCNC: 12.8 G/DL (ref 12–16)
IMM GRANULOCYTES # BLD AUTO: 0.01 K/UL (ref 0–0.04)
IMM GRANULOCYTES NFR BLD AUTO: 0.2 % (ref 0–0.5)
LDLC SERPL CALC-MCNC: ABNORMAL MG/DL (ref 63–159)
LYMPHOCYTES # BLD AUTO: 2.5 K/UL (ref 1–4.8)
LYMPHOCYTES NFR BLD: 40 % (ref 18–48)
MCH RBC QN AUTO: 31 PG (ref 27–31)
MCHC RBC AUTO-ENTMCNC: 34.1 G/DL (ref 32–36)
MCV RBC AUTO: 91 FL (ref 82–98)
MONOCYTES # BLD AUTO: 0.5 K/UL (ref 0.3–1)
MONOCYTES NFR BLD: 8.5 % (ref 4–15)
NEUTROPHILS # BLD AUTO: 3 K/UL (ref 1.8–7.7)
NEUTROPHILS NFR BLD: 46.8 % (ref 38–73)
NONHDLC SERPL-MCNC: 283 MG/DL
NRBC BLD-RTO: 0 /100 WBC
PLATELET # BLD AUTO: 374 K/UL (ref 150–450)
PMV BLD AUTO: 9.6 FL (ref 9.2–12.9)
POTASSIUM SERPL-SCNC: 5.1 MMOL/L (ref 3.5–5.1)
PROT SERPL-MCNC: 7 G/DL (ref 6–8.4)
RBC # BLD AUTO: 4.13 M/UL (ref 4–5.4)
SODIUM SERPL-SCNC: 142 MMOL/L (ref 136–145)
TRIGL SERPL-MCNC: 500 MG/DL (ref 30–150)
WBC # BLD AUTO: 6.32 K/UL (ref 3.9–12.7)

## 2024-07-02 PROCEDURE — G2211 COMPLEX E/M VISIT ADD ON: HCPCS | Mod: S$PBB,,, | Performed by: FAMILY MEDICINE

## 2024-07-02 PROCEDURE — 99999 PR PBB SHADOW E&M-EST. PATIENT-LVL IV: CPT | Mod: PBBFAC,,, | Performed by: FAMILY MEDICINE

## 2024-07-02 PROCEDURE — 83036 HEMOGLOBIN GLYCOSYLATED A1C: CPT | Performed by: FAMILY MEDICINE

## 2024-07-02 PROCEDURE — 99214 OFFICE O/P EST MOD 30 MIN: CPT | Mod: S$PBB,,, | Performed by: FAMILY MEDICINE

## 2024-07-02 PROCEDURE — 85025 COMPLETE CBC W/AUTO DIFF WBC: CPT | Performed by: FAMILY MEDICINE

## 2024-07-02 PROCEDURE — 80053 COMPREHEN METABOLIC PANEL: CPT | Performed by: FAMILY MEDICINE

## 2024-07-02 PROCEDURE — 80061 LIPID PANEL: CPT | Performed by: FAMILY MEDICINE

## 2024-07-02 PROCEDURE — 99214 OFFICE O/P EST MOD 30 MIN: CPT | Mod: PBBFAC,PO | Performed by: FAMILY MEDICINE

## 2024-07-02 PROCEDURE — 36415 COLL VENOUS BLD VENIPUNCTURE: CPT | Mod: PO | Performed by: FAMILY MEDICINE

## 2024-07-02 RX ORDER — CYCLOBENZAPRINE HCL 10 MG
10 TABLET ORAL 3 TIMES DAILY PRN
Qty: 30 TABLET | Refills: 0 | Status: SHIPPED | OUTPATIENT
Start: 2024-07-02 | End: 2024-07-12

## 2024-07-02 RX ORDER — INSULIN ASPART 100 [IU]/ML
INJECTION, SUSPENSION SUBCUTANEOUS
Qty: 6 EACH | Refills: 0 | Status: SHIPPED | OUTPATIENT
Start: 2024-07-02

## 2024-07-02 RX ORDER — POVIDONE 50 MG/10ML
2 SOLUTION/ DROPS OPHTHALMIC DAILY
COMMUNITY

## 2024-07-02 RX ORDER — TRAZODONE HYDROCHLORIDE 100 MG/1
100 TABLET ORAL NIGHTLY
Qty: 30 TABLET | Refills: 0 | Status: SHIPPED | OUTPATIENT
Start: 2024-07-02 | End: 2025-07-02

## 2024-07-02 NOTE — PROGRESS NOTES
Subjective:       Patient ID: Rachelle Philip is a 68 y.o. female.    Chief Complaint: Follow-up    68 y old female here for f.u . Dealing with insomnia and Neck pain . She developed an allergic reaction to gabapentin . She is schedule to see IPM at the end of the months . Doing Yoga . FBS: low 100's . Current opth exam .       Review of Systems   Constitutional: Negative.    HENT: Negative.     Eyes: Negative.    Respiratory: Negative.     Cardiovascular: Negative.    Gastrointestinal: Negative.    Genitourinary: Negative.    Musculoskeletal: Negative.    Skin: Negative.    Hematological: Negative.        Objective:      Physical Exam  Vitals and nursing note reviewed.   Constitutional:       General: She is not in acute distress.     Appearance: She is well-developed. She is not diaphoretic.   HENT:      Head: Normocephalic and atraumatic.      Right Ear: External ear normal.      Left Ear: External ear normal.      Nose: Nose normal.   Eyes:      General: No scleral icterus.        Left eye: No discharge.      Conjunctiva/sclera: Conjunctivae normal.      Pupils: Pupils are equal, round, and reactive to light.   Neck:      Thyroid: No thyromegaly.      Vascular: No JVD.      Trachea: No tracheal deviation.   Cardiovascular:      Rate and Rhythm: Normal rate and regular rhythm.      Heart sounds: Normal heart sounds. No murmur heard.     No friction rub. No gallop.   Pulmonary:      Effort: Pulmonary effort is normal. No respiratory distress.      Breath sounds: Normal breath sounds. No wheezing or rales.   Chest:      Chest wall: No tenderness.   Abdominal:      General: Bowel sounds are normal. There is no distension.      Palpations: Abdomen is soft. There is no mass.      Tenderness: There is no abdominal tenderness. There is no guarding.   Musculoskeletal:      Cervical back: Normal range of motion and neck supple.   Lymphadenopathy:      Cervical: No cervical adenopathy.         Assessment:        Rachelle was seen  today for follow-up.    Diagnoses and all orders for this visit:    DM type 2 with diabetic dyslipidemia  -     CBC Auto Differential; Future  -     Comprehensive Metabolic Panel; Future  -     Hemoglobin A1C; Future  -     Lipid Panel; Future    Hypertension, unspecified type    Insomnia, unspecified type    Neck pain    Other orders  -     traZODone (DESYREL) 100 MG tablet; Take 1 tablet (100 mg total) by mouth every evening.  -     cyclobenzaprine (FLEXERIL) 10 MG tablet; Take 1 tablet (10 mg total) by mouth 3 (three) times daily as needed for Muscle spasms.       Plan:   Diet and exercise   Controlled. Cont med   Trial of Trazodone   Prn flexeril . Keep IPM dean .

## 2024-07-12 ENCOUNTER — OFFICE VISIT (OUTPATIENT)
Dept: FAMILY MEDICINE | Facility: CLINIC | Age: 69
End: 2024-07-12
Attending: FAMILY MEDICINE
Payer: MEDICARE

## 2024-07-12 VITALS
WEIGHT: 158.06 LBS | OXYGEN SATURATION: 97 % | TEMPERATURE: 97 F | SYSTOLIC BLOOD PRESSURE: 138 MMHG | BODY MASS INDEX: 31.03 KG/M2 | HEIGHT: 60 IN | HEART RATE: 70 BPM | DIASTOLIC BLOOD PRESSURE: 76 MMHG

## 2024-07-12 DIAGNOSIS — E11.69 DM TYPE 2 WITH DIABETIC DYSLIPIDEMIA: Primary | ICD-10-CM

## 2024-07-12 DIAGNOSIS — M48.062 NEUROGENIC CLAUDICATION DUE TO LUMBAR SPINAL STENOSIS: ICD-10-CM

## 2024-07-12 DIAGNOSIS — E78.5 DM TYPE 2 WITH DIABETIC DYSLIPIDEMIA: Primary | ICD-10-CM

## 2024-07-12 PROCEDURE — 99214 OFFICE O/P EST MOD 30 MIN: CPT | Mod: PBBFAC,PO | Performed by: FAMILY MEDICINE

## 2024-07-12 PROCEDURE — 99999 PR PBB SHADOW E&M-EST. PATIENT-LVL IV: CPT | Mod: PBBFAC,,, | Performed by: FAMILY MEDICINE

## 2024-07-12 RX ORDER — HYDROCODONE BITARTRATE AND ACETAMINOPHEN 5; 325 MG/1; MG/1
1 TABLET ORAL EVERY 6 HOURS PRN
Qty: 20 TABLET | Refills: 0 | Status: SHIPPED | OUTPATIENT
Start: 2024-07-12

## 2024-07-12 RX ORDER — INSULIN ASPART 100 [IU]/ML
INJECTION, SUSPENSION SUBCUTANEOUS
Qty: 6 EACH | Refills: 0 | Status: SHIPPED | OUTPATIENT
Start: 2024-07-12

## 2024-07-12 NOTE — PROGRESS NOTES
Subjective:       Patient ID: Rachelle Philip is a 68 y.o. female.    Chief Complaint: Follow-up    68 y old female here to go over recent labs . .Lab Results       Component                Value               Date                       CHOL                     325 (H)             07/02/2024                 CHOL                     291 (H)             03/01/2024                 CHOL                     214 (H)             09/25/2023            Lab Results       Component                Value               Date                       HDL                      42                  07/02/2024                 HDL                      50                  03/01/2024                 HDL                      53                  09/25/2023            Lab Results       Component                Value               Date                       LDLCALC                                      07/02/2024             Invalid, Trig>400.0       LDLCALC                  190.2 (H)           03/01/2024                 LDLCALC                  124.8               09/25/2023            Lab Results       Component                Value               Date                       TRIG                     500 (H)             07/02/2024                 TRIG                     254 (H)             03/01/2024                 TRIG                     181 (H)             09/25/2023              Lab Results       Component                Value               Date                       CHOLHDL                  12.9 (L)            07/02/2024                 CHOLHDL                  17.2 (L)            03/01/2024                 CHOLHDL                  24.8                09/25/2023           . She could not tolerate lipitor 80 mg ( dyspepsia) . She started taking it again . Only takes PPI prn . She has also modified her diet and continues doing Yoga . Upset about persistent lower back pain . Gabapentin caused angioedema and urticaria. Flexeril has failed to provide  relief .       Review of Systems   Constitutional: Negative.    HENT: Negative.     Eyes: Negative.    Respiratory: Negative.     Cardiovascular: Negative.    Gastrointestinal: Negative.    Genitourinary: Negative.    Musculoskeletal:  Positive for arthralgias.   Skin: Negative.    Hematological: Negative.        Objective:      Physical Exam  Vitals and nursing note reviewed.   Constitutional:       General: She is not in acute distress.     Appearance: She is well-developed. She is not diaphoretic.   HENT:      Head: Normocephalic and atraumatic.      Right Ear: External ear normal.      Left Ear: External ear normal.      Nose: Nose normal.   Eyes:      General: No scleral icterus.        Left eye: No discharge.      Conjunctiva/sclera: Conjunctivae normal.      Pupils: Pupils are equal, round, and reactive to light.   Neck:      Thyroid: No thyromegaly.      Vascular: No JVD.      Trachea: No tracheal deviation.   Cardiovascular:      Rate and Rhythm: Normal rate and regular rhythm.      Heart sounds: Normal heart sounds. No murmur heard.     No friction rub. No gallop.   Pulmonary:      Effort: Pulmonary effort is normal. No respiratory distress.      Breath sounds: Normal breath sounds. No wheezing or rales.   Chest:      Chest wall: No tenderness.   Abdominal:      General: Bowel sounds are normal. There is no distension.      Palpations: Abdomen is soft. There is no mass.      Tenderness: There is no abdominal tenderness. There is no guarding.   Musculoskeletal:      Cervical back: Normal range of motion and neck supple.   Lymphadenopathy:      Cervical: No cervical adenopathy.         Assessment:      Rachelle was seen today for follow-up.    Diagnoses and all orders for this visit:    DM type 2 with diabetic dyslipidemia    Neurogenic claudication due to lumbar spinal stenosis    Other orders  -     insulin aspart protamine-insulin aspart (NOVOLOG MIX 70-30FLEXPEN U-100) 100 unit/mL (70-30) InPn pen; 45 u  sq  BID. Dispense  6  boxes  -     HYDROcodone-acetaminophen (NORCO) 5-325 mg per tablet; Take 1 tablet by mouth every 6 (six) hours as needed for Pain.       Plan:   Continue atorvastatin 80 mg . F.u in 3 m   Prn Hydrocodone . Side effects discussed. F.u with IPM

## 2024-07-25 DIAGNOSIS — N95.2 VAGINAL ATROPHY: ICD-10-CM

## 2024-07-25 RX ORDER — ESTRADIOL 0.1 MG/G
CREAM VAGINAL
Qty: 43 G | Refills: 0 | OUTPATIENT
Start: 2024-07-25

## 2024-07-25 NOTE — TELEPHONE ENCOUNTER
Refill Decision Note   Rachelle Philip  is requesting a refill authorization.  Brief Assessment and Rationale for Refill:  Quick Discontinue     Medication Therapy Plan:  discontinued on 9/25/2023 by Soumya Souza MA. Patient not taking. Med has not been written in over 2 years      Comments:     Note composed:1:14 PM 07/25/2024

## 2024-07-29 ENCOUNTER — PATIENT MESSAGE (OUTPATIENT)
Dept: FAMILY MEDICINE | Facility: CLINIC | Age: 69
End: 2024-07-29
Payer: MEDICARE

## 2024-08-15 ENCOUNTER — TELEPHONE (OUTPATIENT)
Dept: PAIN MEDICINE | Facility: CLINIC | Age: 69
End: 2024-08-15
Payer: MEDICARE

## 2024-08-15 NOTE — TELEPHONE ENCOUNTER
----- Message from Vanessa Bruno MA sent at 8/15/2024  1:41 PM CDT -----  Name of Who is Calling:SHANIQUA BARNES [[id number]]                What is the request in detail: Pt is requesting a call back to see if she can get rescheduled for one of these dates 8/22, 8/23, 8/29 or 30th any time. Please assist.                Can the clinic reply by [facility]: No                What Number to Call Back if not in [facility]: [phone]

## 2024-08-15 NOTE — TELEPHONE ENCOUNTER
[phone] opt 1- #Marcial [id number]    Contacted pt. Appt reschedule to  8/22/24 Carlo. Location Verified. All questions answered.//lp

## 2024-08-15 NOTE — TELEPHONE ENCOUNTER
----- Message from Indiana Zapata sent at 8/15/2024 12:10 PM CDT -----  Regarding: sooner appt /complaint  Name of who is calling:   Rachelle      What is the request in detail: Pt is requesting a call back in ref o rescheduling appt on 08/21/2024 due to work but needs to be seen sooner than 10/07/2024 due to back pain severe / also when arrived at last appt pt was not informed it was virtual and was turned away. Pt is very upset about the last scheduled visit      Can the clinic reply by [facility]:yes      What number to call back if not [facility]: [phone]

## 2024-08-21 ENCOUNTER — TELEPHONE (OUTPATIENT)
Dept: PAIN MEDICINE | Facility: CLINIC | Age: 69
End: 2024-08-21

## 2024-08-21 NOTE — H&P (VIEW-ONLY)
Chronic Pain -- Established Patient (Follow-up visit)    Referring Physician: Faina Peacock MD    PCP: Faina Peacock MD    Chief Complaint:   Chief Complaint   Patient presents with    Low-back Pain    Leg Pain     Right      C/o low back pain with RLE radicular pain        SUBJECTIVE:    Interval History (8/22/2024):  Kamari  Dillonkristal #[id number] present for today's visit.   Rachelle Philip presents today for follow-up visit.  Patient was last seen on 5/23/2024. At that visit, the plan was to start Lyrica, which she stopped due to itching on hands and feet. Once she stopped taking Lyrica, the itching stopped. She also couldn't tolerate gabapentin for the same reason.  Patient reports pain as 9/10 today.  She does feel that meloxicam was helping, but it only lasts for a few hours.      Interval History (5/23/2024): Kamari  #[id number] present for today's visit.   Patient presents today for follow-up visit.  she underwent Right L4/5 + L5/S1 TF DIEGO on 4/3/24 (about 7 weeks ago).  The patient reports that she is/was better following the procedure.  she reports 50% pain relief.  The changes lasted 4 weeks so far.  The changes have continued through this visit.  Patient reports pain as 3/10 today.  She could not tolerate gabapentin due to itching.  She took it for about 3 weeks, and she also reports swollen eyes and mouth.    3/20/2024: Follow Up Visit:  Rachelle is here for a f/u visit after the lumbar spine MRI.  The results were discussed with her and are detailed below.  I also did independent interpretation of the films.  She has chronic low back pain and right leg radicular pain over L4 and L5 dermatomes.  We also ordered physical therapy, and started her on nabumetone and gabapentin.  She has done 6 weeks of therapy so far and it was helping.  The medications are helping very little.  No side effects.  The pain continues to get worse with prolonged walking and standing.  She has not  been able to work for the last 3 months.  She has a personal caregiver.    02/21/2024 Initial visit: Interview today was conducted through an .  Rachelle Philip is a 68 y.o. female who presents to the clinic for the evaluation of chronic low back pain (5 years) that has gotten severe over the past 3 years.  No triggering events.  It radiates to the right buttock, outer thigh, outer leg and in her leg to the ankle (L4/L5 dermatomes).  Reports intermittent tingling and numbness to the right foot and intermittent weakness in the right leg.  Denies falls or loss of bowel or bladder control.  The pain is worse with prolonged walking and standing and better with rest.  Exercise helps a little.  She currently rates it at 7/10 and is worst at 10/10 and best at 10/10.  She also has chronic neck pain that is axial and that has gotten worse over the past 3 years.  Her lower back pain is worse than the neck pain.  It is affecting her ADLs.  Patient denies night fever/night sweats.    Treatment History:  Temporary relief with Tylenol.  Partial relief with the exercises and physical therapy.  No relief with meloxicam.  Most recent PT for her knee but not specifically for the back.    Radiological workup:  Last MRI of the lumbar spine was in 2018; 2024.      Pain Disability Index (PDI) Score Review:      8/22/2024     8:05 AM 5/23/2024    10:17 AM 3/20/2024     9:57 AM   Last 3 PDI Scores   Pain Disability Index (PDI) 44 21 41       Pain Injections:    Dr. Betts:  - 4/3/24: Right L4/5 + L5/S1 TF DIEGO with 50% pain relief short term       Review of Systems:  GENERAL:  No weight loss, malaise or fevers.  HEENT:   No recent changes in vision or hearing  NECK:  Negative for lumps, no difficulty with swallowing.  RESPIRATORY:  Negative for cough, wheezing or shortness of breath, patient denies any recent URI.  CARDIOVASCULAR:  Negative for chest pain, leg swelling or palpitations.  GI:  Negative for abdominal discomfort, blood in  stools or black stools or change in bowel habits.  MUSCULOSKELETAL:  See HPI.  SKIN:  Negative for lesions, rash, and itching.  PSYCH:  No mood disorder or recent psychosocial stressors.  Patients sleep is not disturbed secondary to pain.  HEMATOLOGY/LYMPHOLOGY:  Negative for prolonged bleeding, bruising easily or swollen nodes.  Patient is not currently taking any anti-coagulants  NEURO:   No history of headaches, syncope, paralysis, seizures or tremors.  All other reviewed and negative other than HPI.        OBJECTIVE:    Physical Exam:  Vitals:    08/22/24 0803   BP: (!) 166/80   Pulse: (P) 88   Resp: 17   Weight: 70 kg (154 lb 5.2 oz)   Height: 5' (1.524 m)   PainSc:   9   Body mass index is 30.14 kg/m².   (reviewed on 8/22/2024)    GENERAL: Well appearing, in no acute distress, alert and oriented x3.  PSYCH:  Mood and affect appropriate.  SKIN: Skin color, texture, turgor normal, no rashes or lesions.  HEAD/FACE:  Normocephalic, atraumatic.  PULM: No evidence of respiratory difficulty, symmetric chest rise.  No audible wheezing.    GAIT: slow    LUMBAR SPINE:   Palpation: Tenderness over bilateral facet joints and paraspinous muscles.   ROM: Pain with extension and rotation.  Straight leg raising is positive on the right.  SI JOINTS: bilateral SI joints, no tenderness, negative Donny's  HIPS: normal and nonpainful ROM of both hip joints.    NEURO:   MOTOR: Bilateral upper and lower extremity muscle strength is normal. No atrophy or tone abnormalities are noted.  SENSORY: No loss of sensation in C4 through T1 dermatomes bilaterally, and in L3 through S1 dermatomes except for diminished pinprick sensation over the right S1 dermatome.  DTR's: Reflexes are physiologic and symmetric in upper and lower extremities except for weak right knee reflex. No clonus.  Negative Tovar's bilaterally.         Imaging (Reviewed on 8/22/2024):    Lumbar spine MRI on 02/21/2024:  1. Severe bilateral facet arthropathy with grade  1 anterolisthesis of L3 on L4 and L4 on L5.  2. Severe central canal stenosis at L4/5 and complete obliteration of the right lateral recess with impingement upon the descending right L5 nerve root in its lateral recess, and severe right foraminal stenosis with impingement upon the right L4 nerve root.  3. L3/4 moderate central canal stenosis.  Mild right and moderate left foraminal stenosis.  4. Moderate left L2-L3 neural foraminal stenosis.    Lumbar spine MRI  06/26/18   Grade 1 spondylolisthesis at the L3-4 and L4-5 levels. L5 is a transitional vertebra.    Bilateral L3/4 bony neural foraminal narrowing with possible but not definite L3 nerve root impingements.  L4-L5: right-sided neural foraminal narrowing and right L4 nerve root impingement. Moderate central spinal stenosis.                  ASSESSMENT: 68 y.o. year old female with:    1. Right lumbar radiculopathy  Case Request-RAD/Other Procedure Area: Lumbar L4/5 IL DIEGO w/paramedian approach-charity pt - requires int [place]      2. DDD (degenerative disc disease), lumbar        3. Neurogenic claudication due to lumbar spinal stenosis  topiramate (TOPAMAX) 25 MG tablet      4. Lumbar disc disease with radiculopathy  celecoxib (CELEBREX) 100 MG capsule    acetaminophen (TYLENOL) 500 MG tablet              PLAN:   1. Interventional:   - Schedule L4/5 IL DIEGO with right paramedian approach.   Patient is taking ASA as 1° prevention; she will have to stop 5 days prior to procedure.  Will get clearance from PCP.  We will schedule with another MD in our clinic since Dr. Betts is no longer with our department.  - Consider L4/5 Viadisc - if limited pain relief from above procedure.   - Consider SCS trial - if limited pain relief from above procedure.    - S/p Right L4/5 + L5/S1 TF DIEGO on 4/3/24 with 50% pain relief     - Anticoagulation use: ASA.    2. Pharmacologic:   - Start Topamax 25mg BID.  Patient informed not to stop medication if having inadequate pain relief;  consider increase if not helping after 2-3 weeks. Will plan to increase to 50mg BID at that time if appropriate. Potential side effects of this medication include drowsiness, dizziness, tingling of the hands and feet, diarrhea, dysgeusia, weight loss/anorexia.  D/c Lyrica 75mg BID - due to itching; same effect she had with gabapentin.      - Start Celebrex 100mg BID PRN.  Take with food.  Avoid other OTC NSAIDs (ex. Ibuprofen, naproxen) while taking this medication. We have previously discussed potential deleterious side effects of NSAIDs on the cardiovascular, gastrointestinal and renal systems. We have previously discussed judicious use of this medication.  D/c Mobic 7.5mg BID PRN pain -- only helping short term.    - Try Tylenol (acetaminophen) 500mg x 2 tablets (1000mg) TID PRN, not to exceed 3000mg per day.    - LA  reviewed and appropriate.      3. Rehabilitative: Continue physician-directed at home exercises learned from physical therapy/ ambulation/ stretching to help manage the patient/s painful condition. The patient was counseled that muscle strengthening will improve the long term prognosis in regards to pain and may also help increase range of motion and mobility. Physician-directed at home exercises/stretches performed > 6 weeks within last 6 months have failed to help with this pain.    Continue stationary bike at the gym.  Patient instructed to avoid heavy lifting and excessive bending.    4. Diagnostic: Lumbar MRI 2024: severe right L4-5 lateral recess and foraminal stenosis from disc bulging and facet hypertrophy with significant impingement of the right L4 and L5 nerve roots (and only moderate central spinal stenosis, although radiologist report described it as severe).    5. Other: Previously noted: given note stating that she we will not be able to return to work before 4 weeks.  She has a personal caregiver.    6. Follow up: 4 weeks post-procedure  - in clinic - with     Future  Appointments   Date Time Provider Department Center   10/17/2024  9:20 AM Faina Peacock MD [facility]   10/24/2024 11:20 AM Adilia Santiago PA-C [facility]        - This condition does not require this patient to take time off of work, and the primary goal of our Pain Management services is to improve the patient's functional capacity.   - I discussed the risks, benefits, and alternatives to potential treatment options. All questions and concerns were fully addressed today in clinic.         Adilia Santiago PA-C  Interventional Pain Management - [facility]    Disclaimer:  This note was prepared using voice recognition system and is likely to have sound alike errors that may have been overlooked even after proof reading.  Please call me with any questions.

## 2024-08-21 NOTE — PROGRESS NOTES
Chronic Pain -- Established Patient (Follow-up visit)    Referring Physician: Faina Peacock MD    PCP: Faina Peacock MD    Chief Complaint:   Chief Complaint   Patient presents with    Low-back Pain    Leg Pain     Right      C/o low back pain with RLE radicular pain        SUBJECTIVE:    Interval History (8/22/2024):  Kamari  Dillonkristal #[id number] present for today's visit.   Rachelle Philip presents today for follow-up visit.  Patient was last seen on 5/23/2024. At that visit, the plan was to start Lyrica, which she stopped due to itching on hands and feet. Once she stopped taking Lyrica, the itching stopped. She also couldn't tolerate gabapentin for the same reason.  Patient reports pain as 9/10 today.  She does feel that meloxicam was helping, but it only lasts for a few hours.      Interval History (5/23/2024): Kamari  #[id number] present for today's visit.   Patient presents today for follow-up visit.  she underwent Right L4/5 + L5/S1 TF DIEGO on 4/3/24 (about 7 weeks ago).  The patient reports that she is/was better following the procedure.  she reports 50% pain relief.  The changes lasted 4 weeks so far.  The changes have continued through this visit.  Patient reports pain as 3/10 today.  She could not tolerate gabapentin due to itching.  She took it for about 3 weeks, and she also reports swollen eyes and mouth.    3/20/2024: Follow Up Visit:  Rachelle is here for a f/u visit after the lumbar spine MRI.  The results were discussed with her and are detailed below.  I also did independent interpretation of the films.  She has chronic low back pain and right leg radicular pain over L4 and L5 dermatomes.  We also ordered physical therapy, and started her on nabumetone and gabapentin.  She has done 6 weeks of therapy so far and it was helping.  The medications are helping very little.  No side effects.  The pain continues to get worse with prolonged walking and standing.  She has not  Vandana called again wondering about patient getting a fistulogram. Vandana said the duplex he had showed 2 areas of stenosis. Please call Vandana with any questions.    been able to work for the last 3 months.  She has a personal caregiver.    02/21/2024 Initial visit: Interview today was conducted through an .  Rachelle Philip is a 68 y.o. female who presents to the clinic for the evaluation of chronic low back pain (5 years) that has gotten severe over the past 3 years.  No triggering events.  It radiates to the right buttock, outer thigh, outer leg and in her leg to the ankle (L4/L5 dermatomes).  Reports intermittent tingling and numbness to the right foot and intermittent weakness in the right leg.  Denies falls or loss of bowel or bladder control.  The pain is worse with prolonged walking and standing and better with rest.  Exercise helps a little.  She currently rates it at 7/10 and is worst at 10/10 and best at 10/10.  She also has chronic neck pain that is axial and that has gotten worse over the past 3 years.  Her lower back pain is worse than the neck pain.  It is affecting her ADLs.  Patient denies night fever/night sweats.    Treatment History:  Temporary relief with Tylenol.  Partial relief with the exercises and physical therapy.  No relief with meloxicam.  Most recent PT for her knee but not specifically for the back.    Radiological workup:  Last MRI of the lumbar spine was in 2018; 2024.      Pain Disability Index (PDI) Score Review:      8/22/2024     8:05 AM 5/23/2024    10:17 AM 3/20/2024     9:57 AM   Last 3 PDI Scores   Pain Disability Index (PDI) 44 21 41       Pain Injections:    Dr. Betts:  - 4/3/24: Right L4/5 + L5/S1 TF DIEGO with 50% pain relief short term       Review of Systems:  GENERAL:  No weight loss, malaise or fevers.  HEENT:   No recent changes in vision or hearing  NECK:  Negative for lumps, no difficulty with swallowing.  RESPIRATORY:  Negative for cough, wheezing or shortness of breath, patient denies any recent URI.  CARDIOVASCULAR:  Negative for chest pain, leg swelling or palpitations.  GI:  Negative for abdominal discomfort, blood in  stools or black stools or change in bowel habits.  MUSCULOSKELETAL:  See HPI.  SKIN:  Negative for lesions, rash, and itching.  PSYCH:  No mood disorder or recent psychosocial stressors.  Patients sleep is not disturbed secondary to pain.  HEMATOLOGY/LYMPHOLOGY:  Negative for prolonged bleeding, bruising easily or swollen nodes.  Patient is not currently taking any anti-coagulants  NEURO:   No history of headaches, syncope, paralysis, seizures or tremors.  All other reviewed and negative other than HPI.        OBJECTIVE:    Physical Exam:  Vitals:    08/22/24 0803   BP: (!) 166/80   Pulse: (P) 88   Resp: 17   Weight: 70 kg (154 lb 5.2 oz)   Height: 5' (1.524 m)   PainSc:   9   Body mass index is 30.14 kg/m².   (reviewed on 8/22/2024)    GENERAL: Well appearing, in no acute distress, alert and oriented x3.  PSYCH:  Mood and affect appropriate.  SKIN: Skin color, texture, turgor normal, no rashes or lesions.  HEAD/FACE:  Normocephalic, atraumatic.  PULM: No evidence of respiratory difficulty, symmetric chest rise.  No audible wheezing.    GAIT: slow    LUMBAR SPINE:   Palpation: Tenderness over bilateral facet joints and paraspinous muscles.   ROM: Pain with extension and rotation.  Straight leg raising is positive on the right.  SI JOINTS: bilateral SI joints, no tenderness, negative Donny's  HIPS: normal and nonpainful ROM of both hip joints.    NEURO:   MOTOR: Bilateral upper and lower extremity muscle strength is normal. No atrophy or tone abnormalities are noted.  SENSORY: No loss of sensation in C4 through T1 dermatomes bilaterally, and in L3 through S1 dermatomes except for diminished pinprick sensation over the right S1 dermatome.  DTR's: Reflexes are physiologic and symmetric in upper and lower extremities except for weak right knee reflex. No clonus.  Negative Tovar's bilaterally.         Imaging (Reviewed on 8/22/2024):    Lumbar spine MRI on 02/21/2024:  1. Severe bilateral facet arthropathy with grade  1 anterolisthesis of L3 on L4 and L4 on L5.  2. Severe central canal stenosis at L4/5 and complete obliteration of the right lateral recess with impingement upon the descending right L5 nerve root in its lateral recess, and severe right foraminal stenosis with impingement upon the right L4 nerve root.  3. L3/4 moderate central canal stenosis.  Mild right and moderate left foraminal stenosis.  4. Moderate left L2-L3 neural foraminal stenosis.    Lumbar spine MRI  06/26/18   Grade 1 spondylolisthesis at the L3-4 and L4-5 levels. L5 is a transitional vertebra.    Bilateral L3/4 bony neural foraminal narrowing with possible but not definite L3 nerve root impingements.  L4-L5: right-sided neural foraminal narrowing and right L4 nerve root impingement. Moderate central spinal stenosis.                  ASSESSMENT: 68 y.o. year old female with:    1. Right lumbar radiculopathy  Case Request-RAD/Other Procedure Area: Lumbar L4/5 IL DIEGO w/paramedian approach-charity pt - requires int [place]      2. DDD (degenerative disc disease), lumbar        3. Neurogenic claudication due to lumbar spinal stenosis  topiramate (TOPAMAX) 25 MG tablet      4. Lumbar disc disease with radiculopathy  celecoxib (CELEBREX) 100 MG capsule    acetaminophen (TYLENOL) 500 MG tablet              PLAN:   1. Interventional:   - Schedule L4/5 IL DIEGO with right paramedian approach.   Patient is taking ASA as 1° prevention; she will have to stop 5 days prior to procedure.  Will get clearance from PCP.  We will schedule with another MD in our clinic since Dr. Betts is no longer with our department.  - Consider L4/5 Viadisc - if limited pain relief from above procedure.   - Consider SCS trial - if limited pain relief from above procedure.    - S/p Right L4/5 + L5/S1 TF DIEGO on 4/3/24 with 50% pain relief     - Anticoagulation use: ASA.    2. Pharmacologic:   - Start Topamax 25mg BID.  Patient informed not to stop medication if having inadequate pain relief;  consider increase if not helping after 2-3 weeks. Will plan to increase to 50mg BID at that time if appropriate. Potential side effects of this medication include drowsiness, dizziness, tingling of the hands and feet, diarrhea, dysgeusia, weight loss/anorexia.  D/c Lyrica 75mg BID - due to itching; same effect she had with gabapentin.      - Start Celebrex 100mg BID PRN.  Take with food.  Avoid other OTC NSAIDs (ex. Ibuprofen, naproxen) while taking this medication. We have previously discussed potential deleterious side effects of NSAIDs on the cardiovascular, gastrointestinal and renal systems. We have previously discussed judicious use of this medication.  D/c Mobic 7.5mg BID PRN pain -- only helping short term.    - Try Tylenol (acetaminophen) 500mg x 2 tablets (1000mg) TID PRN, not to exceed 3000mg per day.    - LA  reviewed and appropriate.      3. Rehabilitative: Continue physician-directed at home exercises learned from physical therapy/ ambulation/ stretching to help manage the patient/s painful condition. The patient was counseled that muscle strengthening will improve the long term prognosis in regards to pain and may also help increase range of motion and mobility. Physician-directed at home exercises/stretches performed > 6 weeks within last 6 months have failed to help with this pain.    Continue stationary bike at the gym.  Patient instructed to avoid heavy lifting and excessive bending.    4. Diagnostic: Lumbar MRI 2024: severe right L4-5 lateral recess and foraminal stenosis from disc bulging and facet hypertrophy with significant impingement of the right L4 and L5 nerve roots (and only moderate central spinal stenosis, although radiologist report described it as severe).    5. Other: Previously noted: given note stating that she we will not be able to return to work before 4 weeks.  She has a personal caregiver.    6. Follow up: 4 weeks post-procedure  - in clinic - with     Future  Appointments   Date Time Provider Department Center   10/17/2024  9:20 AM Faina Peacock MD [facility]   10/24/2024 11:20 AM Adilia Santiago PA-C [facility]        - This condition does not require this patient to take time off of work, and the primary goal of our Pain Management services is to improve the patient's functional capacity.   - I discussed the risks, benefits, and alternatives to potential treatment options. All questions and concerns were fully addressed today in clinic.         Adilia Santiago PA-C  Interventional Pain Management - [facility]    Disclaimer:  This note was prepared using voice recognition system and is likely to have sound alike errors that may have been overlooked even after proof reading.  Please call me with any questions.

## 2024-08-22 ENCOUNTER — TELEPHONE (OUTPATIENT)
Dept: PAIN MEDICINE | Facility: CLINIC | Age: 69
End: 2024-08-22
Payer: MEDICARE

## 2024-08-22 ENCOUNTER — OFFICE VISIT (OUTPATIENT)
Dept: PAIN MEDICINE | Facility: CLINIC | Age: 69
End: 2024-08-22
Payer: MEDICARE

## 2024-08-22 VITALS
HEIGHT: 60 IN | RESPIRATION RATE: 17 BRPM | BODY MASS INDEX: 30.29 KG/M2 | SYSTOLIC BLOOD PRESSURE: 166 MMHG | WEIGHT: 154.31 LBS | DIASTOLIC BLOOD PRESSURE: 80 MMHG

## 2024-08-22 DIAGNOSIS — M48.062 NEUROGENIC CLAUDICATION DUE TO LUMBAR SPINAL STENOSIS: ICD-10-CM

## 2024-08-22 DIAGNOSIS — M54.16 RIGHT LUMBAR RADICULOPATHY: Primary | ICD-10-CM

## 2024-08-22 DIAGNOSIS — M51.36 DDD (DEGENERATIVE DISC DISEASE), LUMBAR: ICD-10-CM

## 2024-08-22 DIAGNOSIS — M51.16 LUMBAR DISC DISEASE WITH RADICULOPATHY: ICD-10-CM

## 2024-08-22 PROCEDURE — 99214 OFFICE O/P EST MOD 30 MIN: CPT | Mod: PBBFAC,PO | Performed by: PHYSICIAN ASSISTANT

## 2024-08-22 PROCEDURE — 99999 PR PBB SHADOW E&M-EST. PATIENT-LVL IV: CPT | Mod: PBBFAC,,, | Performed by: PHYSICIAN ASSISTANT

## 2024-08-22 RX ORDER — CELECOXIB 100 MG/1
100 CAPSULE ORAL 2 TIMES DAILY PRN
Qty: 60 CAPSULE | Refills: 0 | Status: SHIPPED | OUTPATIENT
Start: 2024-08-22

## 2024-08-22 RX ORDER — ACETAMINOPHEN 500 MG
1000 TABLET ORAL EVERY 8 HOURS PRN
Start: 2024-08-22

## 2024-08-22 RX ORDER — TOPIRAMATE 25 MG/1
25 TABLET ORAL 2 TIMES DAILY
Qty: 60 TABLET | Refills: 1 | Status: SHIPPED | OUTPATIENT
Start: 2024-08-22

## 2024-08-22 NOTE — TELEPHONE ENCOUNTER
----- Message from Faina Peacock MD sent at 8/22/2024  9:08 AM CDT -----  Ok to hold ASA 5 days prior to procedure.  ----- Message -----  From: Obi English MA  Sent: 8/22/2024   8:53 AM CDT  To: MD Dr. Harinder Wasserman Rosa Valle was seen in office with complaints of severe low back pain. Dr. Hewitt would like to perform lumbar epidural, and Rachelle Philip would like to proceed. Dr. Hewitt is requesting for clearance to hold ASA 5 days prior to procedure. Patient Rachelle Philip can resume medication following the procedure.     Thank You,  Deborah DE ANDA  [facility] Surgery Scheduler

## 2024-08-22 NOTE — PATIENT INSTRUCTIONS
Start Topamax 25mg twice per day -- for nerve pain. Don't stop taking if not helping enough because we can increase at that time if you are tolerating.  Stop Lyrica (pregabalin)     Start Celebrex 100mg twice per day as needed.  Take with food.  Avoid other OTC NSAIDs (ex. Ibuprofen, naproxen) while taking this medication.   Stop Mobic (meloxicam).

## 2024-08-23 RX ORDER — HYDROCODONE BITARTRATE AND ACETAMINOPHEN 5; 325 MG/1; MG/1
1 TABLET ORAL EVERY 6 HOURS PRN
Qty: 20 TABLET | Refills: 0 | Status: CANCELLED | OUTPATIENT
Start: 2024-08-23

## 2024-08-23 NOTE — TELEPHONE ENCOUNTER
No care due was identified.  [facility] Embedded Care Due Messages. Reference number: [id number].   8/23/2024 1:41:23 PM CDT

## 2024-08-26 RX ORDER — PEN NEEDLE, DIABETIC 30 GX3/16"
NEEDLE, DISPOSABLE MISCELLANEOUS
Qty: 180 EACH | Refills: 1 | Status: SHIPPED | OUTPATIENT
Start: 2024-08-26

## 2024-08-29 ENCOUNTER — TELEPHONE (OUTPATIENT)
Dept: PAIN MEDICINE | Facility: CLINIC | Age: 69
End: 2024-08-29
Payer: MEDICARE

## 2024-08-29 NOTE — TELEPHONE ENCOUNTER
Reach out to pt to reschedule pt procedure per her request to  a Friday. All questions answered.

## 2024-08-29 NOTE — TELEPHONE ENCOUNTER
----- Message from Lu Correa sent at 8/29/2024 12:50 PM CDT -----  Regarding: soon appt  Contact: Rachelle  .Type:  Sooner Apoointment Request    Caller is requesting a sooner appointment.  Caller declined first available appointment listed below.  Caller will not accept being placed on the waitlist and is requesting a message be sent to doctor.  Name of Caller: Rachelle   When is the first available appointment?  Symptoms:  Would the patient rather a call back or a response via [facility]? call  Best Call Back Number: [phone] (home)    Additional Information:  Rachelle want to reschedule for a Thursday or Friday please.

## 2024-09-09 NOTE — PRE-PROCEDURE INSTRUCTIONS
Spoke with patient son regarding procedure scheduled on 9.20     Arrival time 1030     Has patient been sick with fever or on antibiotics within the last 7 days? No     Does the patient have any open wounds, sores or rashes? No     Does the patient have any recent fractures? no     Has patient received a vaccination within the last 7 days? No     Received the COVID vaccination?      Has the patient stopped all medications as directed? asa 5 days    Does patient have a pacemaker, defibrillator, or implantable stimulator? No     Does the patient have a ride to and from procedure and someone reliable to remain with patient? unsure, educated     Is the patient diabetic? yes     Does the patient have sleep apnea? Or use O2 at home? no     Is the patient receiving sedation?       Is the patient instructed to remain NPO beginning at midnight the night before their procedure? Yes     Procedure location confirmed with patient? Yes     Covid- Denies signs/symptoms. Instructed to notify PAT/MD if any changes.

## 2024-09-10 RX ORDER — LOSARTAN POTASSIUM 100 MG/1
TABLET ORAL
Qty: 90 TABLET | Refills: 1 | Status: SHIPPED | OUTPATIENT
Start: 2024-09-10

## 2024-09-10 NOTE — TELEPHONE ENCOUNTER
No care due was identified.  [facility] Embedded Care Due Messages. Reference number: [id number].   9/10/2024 12:26:18 PM CDT

## 2024-09-12 ENCOUNTER — TELEPHONE (OUTPATIENT)
Dept: PAIN MEDICINE | Facility: CLINIC | Age: 69
End: 2024-09-12
Payer: MEDICARE

## 2024-09-12 ENCOUNTER — PATIENT MESSAGE (OUTPATIENT)
Dept: PAIN MEDICINE | Facility: HOSPITAL | Age: 69
End: 2024-09-12
Payer: MEDICARE

## 2024-09-12 NOTE — TELEPHONE ENCOUNTER
Reach out to pt to see if she would like to move her procedure up to tomorrow.    ID:[id number]    Pt reported she stop taking her Asprin 2 weeks ago.

## 2024-09-12 NOTE — TELEPHONE ENCOUNTER
----- Message from Michelle Chery sent at 9/12/2024 11:34 AM CDT -----  Contact: Rachelle/  Pt is calling in regards to if there anything different she has to do before procedure and also when she take celecoxib (CELEBREX) 100 MG capsule pt had information in eye and noise..please call back at [phone]          Thanks  PARKER

## 2024-09-12 NOTE — PRE-PROCEDURE INSTRUCTIONS
Spoke with patient son regarding procedure scheduled on 9.13     Arrival time 0600     Has patient been sick with fever or on antibiotics within the last 7 days? No     Does the patient have any open wounds, sores or rashes? No     Does the patient have any recent fractures? no     Has patient received a vaccination within the last 7 days? No     Received the COVID vaccination?      Has the patient stopped all medications as directed? asa 5 days     Does patient have a pacemaker, defibrillator, or implantable stimulator? No     Does the patient have a ride to and from procedure and someone reliable to remain with patient? unsure, educated     Is the patient diabetic? yes     Does the patient have sleep apnea? Or use O2 at home? no     Is the patient receiving sedation?       Is the patient instructed to remain NPO beginning at midnight the night before their procedure? Yes     Procedure location confirmed with patient? Yes     Covid- Denies signs/symptoms. Instructed to notify PAT/MD if any changes.         [place] Count[place]

## 2024-09-13 ENCOUNTER — HOSPITAL ENCOUNTER (OUTPATIENT)
Facility: HOSPITAL | Age: 69
Discharge: HOME | End: 2024-09-13
Attending: ANESTHESIOLOGY | Admitting: ANESTHESIOLOGY
Payer: MEDICARE

## 2024-09-13 VITALS
BODY MASS INDEX: 29.78 KG/M2 | HEART RATE: 79 BPM | WEIGHT: 151.69 LBS | TEMPERATURE: 98 F | DIASTOLIC BLOOD PRESSURE: 75 MMHG | RESPIRATION RATE: 17 BRPM | OXYGEN SATURATION: 97 % | HEIGHT: 60 IN | SYSTOLIC BLOOD PRESSURE: 164 MMHG

## 2024-09-13 DIAGNOSIS — M54.16 LUMBAR RADICULOPATHY: ICD-10-CM

## 2024-09-13 LAB — POCT GLUCOSE: 128 MG/DL (ref 70–110)

## 2024-09-13 PROCEDURE — 25000003 PHARM REV CODE 250: Performed by: ANESTHESIOLOGY

## 2024-09-13 PROCEDURE — 62323 NJX INTERLAMINAR LMBR/SAC: CPT | Mod: ,,, | Performed by: ANESTHESIOLOGY

## 2024-09-13 PROCEDURE — 82962 GLUCOSE BLOOD TEST: CPT | Performed by: ANESTHESIOLOGY

## 2024-09-13 PROCEDURE — 25500020 PHARM REV CODE 255: Performed by: ANESTHESIOLOGY

## 2024-09-13 PROCEDURE — 62323 NJX INTERLAMINAR LMBR/SAC: CPT | Performed by: ANESTHESIOLOGY

## 2024-09-13 PROCEDURE — 63600175 PHARM REV CODE 636 W HCPCS: Performed by: ANESTHESIOLOGY

## 2024-09-13 RX ORDER — METHYLPREDNISOLONE ACETATE 80 MG/ML
INJECTION, SUSPENSION INTRA-ARTICULAR; INTRALESIONAL; INTRAMUSCULAR; SOFT TISSUE
Status: DISCONTINUED | OUTPATIENT
Start: 2024-09-13 | End: 2024-09-13 | Stop reason: HOSPADM

## 2024-09-13 RX ORDER — MIDAZOLAM HYDROCHLORIDE 1 MG/ML
INJECTION, SOLUTION INTRAMUSCULAR; INTRAVENOUS
Status: DISCONTINUED | OUTPATIENT
Start: 2024-09-13 | End: 2024-09-13 | Stop reason: HOSPADM

## 2024-09-13 RX ORDER — ONDANSETRON HYDROCHLORIDE 2 MG/ML
4 INJECTION, SOLUTION INTRAVENOUS ONCE
Status: COMPLETED | OUTPATIENT
Start: 2024-09-13 | End: 2024-09-13

## 2024-09-13 RX ORDER — BUPIVACAINE HYDROCHLORIDE 2.5 MG/ML
INJECTION, SOLUTION EPIDURAL; INFILTRATION; INTRACAUDAL
Status: DISCONTINUED | OUTPATIENT
Start: 2024-09-13 | End: 2024-09-13 | Stop reason: HOSPADM

## 2024-09-13 RX ORDER — INDOMETHACIN 25 MG/1
CAPSULE ORAL
Status: DISCONTINUED | OUTPATIENT
Start: 2024-09-13 | End: 2024-09-13 | Stop reason: HOSPADM

## 2024-09-13 RX ORDER — FENTANYL CITRATE 50 UG/ML
INJECTION, SOLUTION INTRAMUSCULAR; INTRAVENOUS
Status: DISCONTINUED | OUTPATIENT
Start: 2024-09-13 | End: 2024-09-13 | Stop reason: HOSPADM

## 2024-09-13 RX ADMIN — ONDANSETRON 4 MG: 2 INJECTION INTRAMUSCULAR; INTRAVENOUS at 07:09
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
## 2024-09-13 NOTE — DISCHARGE INSTRUCTIONS

## 2024-09-13 NOTE — DISCHARGE SUMMARY
Discharge Note  Short Stay      SUMMARY     Admit Date: 9/13/2024    Attending Physician: Jose Hewitt MD        Discharge Physician: Jose Hewitt MD        Discharge Date: 9/13/2024 7:20 AM    Procedure(s) (LRB):  Lumbar L4/5 IL DIEGO w/paramedian approach-charity pt - requires int Japanese (N/A)    Final Diagnosis: Right lumbar radiculopathy [M54.16]    Disposition: Home or self care    Patient Instructions:   Current Discharge Medication List        CONTINUE these medications which have NOT CHANGED    Details   atorvastatin (LIPITOR) 80 MG tablet Take 1 tablet (80 mg total) by mouth once daily.  Qty: 90 tablet, Refills: 3    Associated Diagnoses: Dyslipidemia associated with type 2 diabetes mellitus      celecoxib (CELEBREX) 100 MG capsule Take 1 capsule (100 mg total) by mouth 2 (two) times daily as needed for Pain. Take with food.  Avoid other OTC NSAIDs (ex. Ibuprofen, naproxen) while taking this medication.  Qty: 60 capsule, Refills: 0    Associated Diagnoses: Lumbar disc disease with radiculopathy      losartan (COZAAR) 100 MG tablet TAKE 1 TABLET BY MOUTH ONCE DAILY  Qty: 90 tablet, Refills: 1    Comments: .      omeprazole (PRILOSEC) 40 MG capsule Take 1 capsule (40 mg total) by mouth once daily.  Qty: 90 capsule, Refills: 3    Associated Diagnoses: Gastroesophageal reflux disease without esophagitis      acetaminophen (TYLENOL) 500 MG tablet Take 2 tablets (1,000 mg total) by mouth every 8 (eight) hours as needed for Pain. Not actual Rx - Use for Recommended dose instructions    Associated Diagnoses: Lumbar disc disease with radiculopathy      ALLERGY RELIEF, LORATADINE, 10 mg tablet Take 1 tablet by mouth once daily for 7 days  Qty: 90 tablet, Refills: 3    Associated Diagnoses: Allergic reaction to drug, initial encounter      aspirin (ECOTRIN) 81 MG EC tablet Take 81 mg by mouth once daily.      EPINEPHrine (EPIPEN 2-BUBBA) 0.3 mg/0.3 mL AtIn Inject 0.3 mLs (0.3 mg total) into the muscle once. for 1  "dose  Qty: 1 each, Refills: 0      HYDROcodone-acetaminophen (NORCO) 5-325 mg per tablet Take 1 tablet by mouth every 6 (six) hours as needed for Pain.  Qty: 20 tablet, Refills: 0    Comments: Q      insulin aspart protamine-insulin aspart (NOVOLOG MIX 70-30FLEXPEN U-100) 100 unit/mL (70-30) InPn pen 45 u  sq BID. Dispense  6  boxes  Qty: 6 each, Refills: 0      pen needle, diabetic (BD BRANT 2ND GEN PEN NEEDLE) 32 gauge x 5/32" Ndle BD Brant 2nd Gen Pen Needle 32 gauge x 5/32"  USE TO INJECT INSULIN TWICE DAILY  Qty: 180 each, Refills: 1      povidone, PF, (IVIZIA, PF,) 0.5 % Drop Apply 2 drops to eye once daily.      topiramate (TOPAMAX) 25 MG tablet Take 1 tablet (25 mg total) by mouth 2 (two) times daily.  Qty: 60 tablet, Refills: 1    Associated Diagnoses: Neurogenic claudication due to lumbar spinal stenosis      traZODone (DESYREL) 100 MG tablet Take 1 tablet (100 mg total) by mouth every evening.  Qty: 30 tablet, Refills: 0      TRUE METRIX GLUCOSE TEST STRIP Strp USE 1 STRIP TO CHECK GLUCOSE THREE TIMES DAILY  Qty: 300 each, Refills: 3    Associated Diagnoses: Type 2 diabetes mellitus without complication, with long-term current use of insulin                 Discharge Diagnosis: Right lumbar radiculopathy [M54.16]  Condition on Discharge: Stable with no complications to procedure   Diet on Discharge: Same as before.  Activity: as per instruction sheet.  Discharge to: Home with a responsible adult.  Follow up: 2-4 weeks       Please call the office at [phone] if you experience any weakness or loss of sensation, fever > 101.5, pain uncontrolled with oral medications, persistent nausea/vomiting/or diarrhea, redness or drainage from the incisions, or any other worrisome concerns. If physician on call was not reached or could not communicate with our office for any reason please go to the nearest emergency department        "

## 2024-09-13 NOTE — OP NOTE
Lumbar Interlaminar Epidural Steroid Injection under Fluoroscopic Guidance.   Time-out taken to identify patient and procedure prior to starting the procedure.     09/13/2024    PROCEDURE: Interlaminar epidural steroid injection under fluoroscopic guidance.     Pre-Op diagnosis: Right lumbar radiculopathy [M54.16]    Post-Op diagnosis: Right lumbar radiculopathy [M54.16]    PHYSICIAN: Jose Hewitt MD    ASSISTANTS: None     ESTIMATED BLOOD LOSS: none.     COMPLICATIONS: none.     SPECIMENS: none    TECHNIQUE: With the patient laying in a prone position, the area was prepped and draped in the usual sterile fashion using ChloraPrep and a fenestrated drape. 1% lidocaine was given using a 27-gauge needle by raising a wheal and going down to the hub of the needle over the L4/5 interlaminar space.  The interlaminar space was then approached from a R paramedian approach with a 3.5 inch 18-gauge Touhy needle was introduced under fluoroscopic guidance in the AP and Lateral view. Once the Ligamentum flavum was encountered loss of resistance to saline was used to enter the epidural space. With positive loss of resistance and negative CSF or Blood, 3mL contrast dye Omnipaque (300mg/ml) was injected to confirm placement and there was no vascular runoff. Then 1ml 80mg/ml Depomedrol + 5 mL 0.25% Bupivicaine  was injected slowly. Displacement of the radio opaque contrast after injection of the medication confirmed that the medication went into the area of the epidural space.  The patient tolerated the procedure well.     Conscious sedation provided by M.D    The patient was monitored with continuous pulse oximetry, EKG, and intermittent blood pressure monitors.  The patient was hemodynamically stable throughout the entire process was responsive to voice, and breathing spontaneously.  Supplemental O2 was provided at 2L/min via nasal cannula.  Patient was comfortable for the duration of the procedure. (See nurse documentation and  case log for sedation time)    There was a total of 1mg IV Midazolam and 50mcg Fentanyl titrated for the procedure      The patient was monitored after the procedure.   They were given post-procedure and discharge instructions to follow at home.  The patient was discharged in a stable condition.

## 2024-09-27 ENCOUNTER — OFFICE VISIT (OUTPATIENT)
Dept: FAMILY MEDICINE | Facility: CLINIC | Age: 69
End: 2024-09-27
Payer: MEDICARE

## 2024-09-27 VITALS
TEMPERATURE: 98 F | OXYGEN SATURATION: 97 % | DIASTOLIC BLOOD PRESSURE: 54 MMHG | WEIGHT: 151.69 LBS | BODY MASS INDEX: 29.78 KG/M2 | HEART RATE: 100 BPM | HEIGHT: 60 IN | SYSTOLIC BLOOD PRESSURE: 134 MMHG

## 2024-09-27 DIAGNOSIS — J06.9 UPPER RESPIRATORY TRACT INFECTION, UNSPECIFIED TYPE: Primary | ICD-10-CM

## 2024-09-27 LAB
CTP QC/QA: YES
SARS-COV-2 RDRP RESP QL NAA+PROBE: NEGATIVE

## 2024-09-27 PROCEDURE — 99999 PR PBB SHADOW E&M-EST. PATIENT-LVL IV: CPT | Mod: PBBFAC,,, | Performed by: NURSE PRACTITIONER

## 2024-09-27 PROCEDURE — 99214 OFFICE O/P EST MOD 30 MIN: CPT | Mod: PBBFAC,PO | Performed by: NURSE PRACTITIONER

## 2024-09-27 RX ORDER — PROMETHAZINE HYDROCHLORIDE AND DEXTROMETHORPHAN HYDROBROMIDE 6.25; 15 MG/5ML; MG/5ML
5 SYRUP ORAL 3 TIMES DAILY PRN
Qty: 118 ML | Refills: 0 | Status: SHIPPED | OUTPATIENT
Start: 2024-09-27 | End: 2024-10-07

## 2024-09-27 NOTE — PROGRESS NOTES
"CC:   Chief Complaint   Patient presents with    Cough     HPI: This is a new problem.   Rachelle Philip is a 68 y.o. female with a complaint of URI.  The current episode started in the past 5 days.   The problem has been gradually worsening.   Associated symptoms included fever, nasal congestion, cough.    Pertinent negatives include chest pain, dyspnea, wheezing   Treatments tried: tea has been used and this has provided no relief.     [unfilled]  Outpatient Medications Prior to Visit   Medication Sig Dispense Refill    acetaminophen (TYLENOL) 500 MG tablet Take 2 tablets (1,000 mg total) by mouth every 8 (eight) hours as needed for Pain. Not actual Rx - Use for Recommended dose instructions      ALLERGY RELIEF, LORATADINE, 10 mg tablet Take 1 tablet by mouth once daily for 7 days 90 tablet 3    aspirin (ECOTRIN) 81 MG EC tablet Take 81 mg by mouth once daily.      atorvastatin (LIPITOR) 80 MG tablet Take 1 tablet (80 mg total) by mouth once daily. 90 tablet 3    celecoxib (CELEBREX) 100 MG capsule Take 1 capsule (100 mg total) by mouth 2 (two) times daily as needed for Pain. Take with food.  Avoid other OTC NSAIDs (ex. Ibuprofen, naproxen) while taking this medication. 60 capsule 0    HYDROcodone-acetaminophen (NORCO) 5-325 mg per tablet Take 1 tablet by mouth every 6 (six) hours as needed for Pain. 20 tablet 0    insulin aspart protamine-insulin aspart (NOVOLOG MIX 70-30FLEXPEN U-100) 100 unit/mL (70-30) InPn pen 45 u  sq BID. Dispense  6  boxes 6 each 0    losartan (COZAAR) 100 MG tablet TAKE 1 TABLET BY MOUTH ONCE DAILY 90 tablet 1    omeprazole (PRILOSEC) 40 MG capsule Take 1 capsule (40 mg total) by mouth once daily. 90 capsule 3    pen needle, diabetic (BD BRANT 2ND GEN PEN NEEDLE) 32 gauge x 5/32" Ndle BD Brant 2nd Gen Pen Needle 32 gauge x 5/32"  USE TO INJECT INSULIN TWICE DAILY 180 each 1    povidone, PF, (IVIZIA, PF,) 0.5 % Drop Apply 2 drops to eye once daily.      topiramate (TOPAMAX) 25 MG tablet Take 1 " tablet (25 mg total) by mouth 2 (two) times daily. 60 tablet 1    traZODone (DESYREL) 100 MG tablet Take 1 tablet (100 mg total) by mouth every evening. 30 tablet 0    TRUE METRIX GLUCOSE TEST STRIP Strp USE 1 STRIP TO CHECK GLUCOSE THREE TIMES DAILY 300 each 3    EPINEPHrine (EPIPEN 2-BUBBA) 0.3 mg/0.3 mL AtIn Inject 0.3 mLs (0.3 mg total) into the muscle once. for 1 dose 1 each 0     No facility-administered medications prior to visit.        Physical Exam   BP (!) 134/54   Pulse 100   Temp 97.5 °F (36.4 °C)   Ht 5' (1.524 m)   Wt 68.8 kg (151 lb 10.8 oz)   SpO2 97%   BMI 29.62 kg/m²   Constitutional: The patient appears well-developed and well-nourished.   Head: Normocephalic and atraumatic.   Right Ear: Tympanic membrane and ear canal normal. No drainage, swelling or tenderness. Tympanic membrane is not injected, not erythematous and not bulging.   Left Ear: Ear canal normal. No drainage, swelling or tenderness. Tympanic membrane is not injected, not erythematous and not bulging.   Nose: Mucosal edema and rhinorrhea present.   Mouth/Throat: Uvula is midline. Posterior oropharyngeal erythema present. No oropharyngeal exudate.        THE MUCOSA IS BOGGY AND ERYTHEMATOUS.     Eyes: Conjunctivae normal and lids are normal. Pupils are equal, round, and reactive to light. Right eye exhibits no discharge. Left eye exhibits no discharge. Right eye exhibits normal extraocular motion. Left eye exhibits normal extraocular motion.   Neck: Trachea normal and normal range of motion. Neck supple. No tracheal tenderness present. No mass and no thyromegaly present.   Cardiovascular: Normal rate, regular rhythm, S1 normal, S2 normal and normal heart sounds.  Exam reveals no gallop, no S3, no S4 and no friction rub.    No murmur heard.  Pulmonary/Chest: Effort normal and breath sounds normal. No stridor. Not tachypneic. No respiratory distress. The patient has no wheezes. The patient has no rhonchi. The patient has no rales.    Skin: The patient is not diaphoretic.     Encounter Diagnosis   Name Primary?    Upper respiratory tract infection, unspecified type Yes       PLAN:    Rachelle was seen today for cough.    Diagnoses and all orders for this visit:    Upper respiratory tract infection, unspecified type  -     POCT COVID-19 Rapid Screening  -     dexbrompheniramine-phenylep-DM 2-10-20 mg Tab; Take 1 tablet by mouth every 8 (eight) hours as needed (cough/congestion).  -     promethazine-dextromethorphan (PROMETHAZINE-DM) 6.25-15 mg/5 mL Syrp; Take 5 mLs by mouth 3 (three) times daily as needed (cough).  -symptomatic treatment    Medications Ordered This Encounter   Medications    dexbrompheniramine-phenylep-DM 2-10-20 mg Tab     Sig: Take 1 tablet by mouth every 8 (eight) hours as needed (cough/congestion).     Dispense:  20 tablet     Refill:  0    promethazine-dextromethorphan (PROMETHAZINE-DM) 6.25-15 mg/5 mL Syrp     Sig: Take 5 mLs by mouth 3 (three) times daily as needed (cough).     Dispense:  118 mL     Refill:  0     Orders Placed This Encounter   Procedures    POCT COVID-19 Rapid Screening     RTC if symptoms are worsening or changing significantly or if not improved by the end of therapy.

## 2024-10-02 DIAGNOSIS — E11.9 TYPE 2 DIABETES MELLITUS WITHOUT COMPLICATION: ICD-10-CM

## 2024-10-16 RX ORDER — INSULIN ASPART 100 [IU]/ML
INJECTION, SUSPENSION SUBCUTANEOUS
Qty: 90 ML | Refills: 0 | Status: SHIPPED | OUTPATIENT
Start: 2024-10-16 | End: 2024-10-17 | Stop reason: SDUPTHER

## 2024-10-16 NOTE — TELEPHONE ENCOUNTER
Care Due:                  Date            Visit Type   Department     Provider  --------------------------------------------------------------------------------                                MYCHART                              FOLLOWUP/OF  [facility]  Last Visit: 07-      FICE VISIT   MEDICINE       Pranay-Jalyn                              EP -                              PRIMARY      [facility]  Next Visit: 10-      CARE ([facility])   MEDICINE       Pranay-Truxton                                                            Last  Test          Frequency    Reason                     Performed    Due Date  --------------------------------------------------------------------------------    HBA1C.......  6 months...  insulin..................  07- 12-    [employer] Embedded Care Due Messages. Reference number: [id number].   10/16/2024 11:55:02 AM CDT

## 2024-10-16 NOTE — TELEPHONE ENCOUNTER
Provider Staff:  Action required for this patient     Please see care gap opportunities below in Care Due Message:   A1C due 12/30/24    Thanks!  [facility]     Appointments      Date Provider   Last Visit   7/12/2024 Faina Peacock MD   Next Visit   10/17/2024 Faina Peacock MD     Refill Decision Note   Rachelle Philip  is requesting a refill authorization.  Brief Assessment and Rationale for Refill:  Approve     Medication Therapy Plan:         Comments:     Note composed:4:36 PM 10/16/2024

## 2024-10-17 ENCOUNTER — OFFICE VISIT (OUTPATIENT)
Dept: FAMILY MEDICINE | Facility: CLINIC | Age: 69
End: 2024-10-17
Attending: FAMILY MEDICINE
Payer: MEDICARE

## 2024-10-17 ENCOUNTER — TELEPHONE (OUTPATIENT)
Dept: DERMATOLOGY | Facility: CLINIC | Age: 69
End: 2024-10-17
Payer: MEDICARE

## 2024-10-17 ENCOUNTER — LAB VISIT (OUTPATIENT)
Dept: LAB | Facility: HOSPITAL | Age: 69
End: 2024-10-17
Attending: FAMILY MEDICINE
Payer: MEDICARE

## 2024-10-17 VITALS
HEART RATE: 83 BPM | OXYGEN SATURATION: 98 % | HEIGHT: 60 IN | SYSTOLIC BLOOD PRESSURE: 124 MMHG | WEIGHT: 155.44 LBS | TEMPERATURE: 97 F | BODY MASS INDEX: 30.52 KG/M2 | DIASTOLIC BLOOD PRESSURE: 70 MMHG

## 2024-10-17 DIAGNOSIS — L98.9 SKIN LESION: ICD-10-CM

## 2024-10-17 DIAGNOSIS — T78.40XA ALLERGIC REACTION TO DRUG, INITIAL ENCOUNTER: ICD-10-CM

## 2024-10-17 DIAGNOSIS — R05.3 CHRONIC COUGH: ICD-10-CM

## 2024-10-17 DIAGNOSIS — Z79.4 TYPE 2 DIABETES MELLITUS WITHOUT COMPLICATION, WITH LONG-TERM CURRENT USE OF INSULIN: ICD-10-CM

## 2024-10-17 DIAGNOSIS — E11.69 DM TYPE 2 WITH DIABETIC DYSLIPIDEMIA: ICD-10-CM

## 2024-10-17 DIAGNOSIS — E11.69 DM TYPE 2 WITH DIABETIC DYSLIPIDEMIA: Primary | ICD-10-CM

## 2024-10-17 DIAGNOSIS — E78.5 DM TYPE 2 WITH DIABETIC DYSLIPIDEMIA: Primary | ICD-10-CM

## 2024-10-17 DIAGNOSIS — Z23 NEED FOR VACCINATION: ICD-10-CM

## 2024-10-17 DIAGNOSIS — E78.5 DM TYPE 2 WITH DIABETIC DYSLIPIDEMIA: ICD-10-CM

## 2024-10-17 DIAGNOSIS — E11.9 TYPE 2 DIABETES MELLITUS WITHOUT COMPLICATION, WITH LONG-TERM CURRENT USE OF INSULIN: ICD-10-CM

## 2024-10-17 LAB
ALBUMIN SERPL BCP-MCNC: 4 G/DL (ref 3.5–5.2)
ALP SERPL-CCNC: 95 U/L (ref 40–150)
ALT SERPL W/O P-5'-P-CCNC: 26 U/L (ref 10–44)
ANION GAP SERPL CALC-SCNC: 7 MMOL/L (ref 8–16)
AST SERPL-CCNC: 24 U/L (ref 10–40)
BASOPHILS # BLD AUTO: 0.06 K/UL (ref 0–0.2)
BASOPHILS NFR BLD: 0.8 % (ref 0–1.9)
BILIRUB SERPL-MCNC: 0.5 MG/DL (ref 0.1–1)
BUN SERPL-MCNC: 23 MG/DL (ref 8–23)
CALCIUM SERPL-MCNC: 9.5 MG/DL (ref 8.7–10.5)
CHLORIDE SERPL-SCNC: 109 MMOL/L (ref 95–110)
CHOLEST SERPL-MCNC: 271 MG/DL (ref 120–199)
CHOLEST/HDLC SERPL: 4.8 {RATIO} (ref 2–5)
CO2 SERPL-SCNC: 25 MMOL/L (ref 23–29)
CREAT SERPL-MCNC: 1.1 MG/DL (ref 0.5–1.4)
DIFFERENTIAL METHOD BLD: NORMAL
EOSINOPHIL # BLD AUTO: 0.2 K/UL (ref 0–0.5)
EOSINOPHIL NFR BLD: 2.5 % (ref 0–8)
ERYTHROCYTE [DISTWIDTH] IN BLOOD BY AUTOMATED COUNT: 13.1 % (ref 11.5–14.5)
EST. GFR  (NO RACE VARIABLE): 55.4 ML/MIN/1.73 M^2
ESTIMATED AVG GLUCOSE: 160 MG/DL (ref 68–131)
GLUCOSE SERPL-MCNC: 161 MG/DL (ref 70–110)
HBA1C MFR BLD: 7.2 % (ref 4–5.6)
HCT VFR BLD AUTO: 39.8 % (ref 37–48.5)
HDLC SERPL-MCNC: 56 MG/DL (ref 40–75)
HDLC SERPL: 20.7 % (ref 20–50)
HGB BLD-MCNC: 13 G/DL (ref 12–16)
IMM GRANULOCYTES # BLD AUTO: 0.02 K/UL (ref 0–0.04)
IMM GRANULOCYTES NFR BLD AUTO: 0.3 % (ref 0–0.5)
LDLC SERPL CALC-MCNC: 171 MG/DL (ref 63–159)
LYMPHOCYTES # BLD AUTO: 2.4 K/UL (ref 1–4.8)
LYMPHOCYTES NFR BLD: 32.4 % (ref 18–48)
MCH RBC QN AUTO: 30.4 PG (ref 27–31)
MCHC RBC AUTO-ENTMCNC: 32.7 G/DL (ref 32–36)
MCV RBC AUTO: 93 FL (ref 82–98)
MONOCYTES # BLD AUTO: 0.6 K/UL (ref 0.3–1)
MONOCYTES NFR BLD: 7.9 % (ref 4–15)
NEUTROPHILS # BLD AUTO: 4.1 K/UL (ref 1.8–7.7)
NEUTROPHILS NFR BLD: 56.1 % (ref 38–73)
NONHDLC SERPL-MCNC: 215 MG/DL
NRBC BLD-RTO: 0 /100 WBC
PLATELET # BLD AUTO: 448 K/UL (ref 150–450)
PMV BLD AUTO: 9.8 FL (ref 9.2–12.9)
POTASSIUM SERPL-SCNC: 5 MMOL/L (ref 3.5–5.1)
PROT SERPL-MCNC: 7.5 G/DL (ref 6–8.4)
RBC # BLD AUTO: 4.27 M/UL (ref 4–5.4)
SODIUM SERPL-SCNC: 141 MMOL/L (ref 136–145)
TRIGL SERPL-MCNC: 220 MG/DL (ref 30–150)
WBC # BLD AUTO: 7.32 K/UL (ref 3.9–12.7)

## 2024-10-17 PROCEDURE — 99214 OFFICE O/P EST MOD 30 MIN: CPT | Mod: S$PBB,,, | Performed by: FAMILY MEDICINE

## 2024-10-17 PROCEDURE — 80061 LIPID PANEL: CPT | Performed by: FAMILY MEDICINE

## 2024-10-17 PROCEDURE — 80053 COMPREHEN METABOLIC PANEL: CPT | Performed by: FAMILY MEDICINE

## 2024-10-17 PROCEDURE — 83036 HEMOGLOBIN GLYCOSYLATED A1C: CPT | Performed by: FAMILY MEDICINE

## 2024-10-17 PROCEDURE — 90653 IIV ADJUVANT VACCINE IM: CPT | Mod: PBBFAC,PO

## 2024-10-17 PROCEDURE — 99215 OFFICE O/P EST HI 40 MIN: CPT | Mod: PBBFAC,PO | Performed by: FAMILY MEDICINE

## 2024-10-17 PROCEDURE — G2211 COMPLEX E/M VISIT ADD ON: HCPCS | Mod: S$PBB,,, | Performed by: FAMILY MEDICINE

## 2024-10-17 PROCEDURE — G0008 ADMIN INFLUENZA VIRUS VAC: HCPCS | Mod: PBBFAC,PO

## 2024-10-17 PROCEDURE — 36415 COLL VENOUS BLD VENIPUNCTURE: CPT | Mod: PO | Performed by: FAMILY MEDICINE

## 2024-10-17 PROCEDURE — 99999 PR PBB SHADOW E&M-EST. PATIENT-LVL V: CPT | Mod: PBBFAC,,, | Performed by: FAMILY MEDICINE

## 2024-10-17 PROCEDURE — 85025 COMPLETE CBC W/AUTO DIFF WBC: CPT | Performed by: FAMILY MEDICINE

## 2024-10-17 PROCEDURE — 99999PBSHW FLU VACCINE - ADJUVANTED: Mod: PBBFAC,,,

## 2024-10-17 RX ORDER — LORATADINE 10 MG/1
10 TABLET ORAL DAILY
Qty: 90 TABLET | Refills: 3 | Status: SHIPPED | OUTPATIENT
Start: 2024-10-17

## 2024-10-17 RX ORDER — PROMETHAZINE HYDROCHLORIDE AND DEXTROMETHORPHAN HYDROBROMIDE 6.25; 15 MG/5ML; MG/5ML
5 SYRUP ORAL EVERY 4 HOURS PRN
Qty: 118 ML | Refills: 0 | Status: SHIPPED | OUTPATIENT
Start: 2024-10-17 | End: 2024-10-27

## 2024-10-17 RX ORDER — ALBUTEROL SULFATE 90 UG/1
2 INHALANT RESPIRATORY (INHALATION) EVERY 6 HOURS PRN
Qty: 18 G | Refills: 0 | Status: SHIPPED | OUTPATIENT
Start: 2024-10-17

## 2024-10-17 RX ORDER — CALCIUM CITRATE/VITAMIN D3 200MG-6.25
TABLET ORAL
Qty: 300 EACH | Refills: 3 | Status: SHIPPED | OUTPATIENT
Start: 2024-10-17

## 2024-10-17 RX ORDER — MELOXICAM 15 MG/1
15 TABLET ORAL DAILY
Qty: 30 TABLET | Refills: 3 | Status: SHIPPED | OUTPATIENT
Start: 2024-10-17

## 2024-10-17 RX ORDER — LANCETS
EACH MISCELLANEOUS
Qty: 180 EACH | Refills: 4 | Status: SHIPPED | OUTPATIENT
Start: 2024-10-17

## 2024-10-17 RX ORDER — FLUTICASONE PROPIONATE 50 MCG
2 SPRAY, SUSPENSION (ML) NASAL DAILY
Qty: 16 G | Refills: 0 | Status: SHIPPED | OUTPATIENT
Start: 2024-10-17

## 2024-10-17 RX ORDER — LOSARTAN POTASSIUM 100 MG/1
TABLET ORAL
Qty: 90 TABLET | Refills: 1 | Status: SHIPPED | OUTPATIENT
Start: 2024-10-17

## 2024-10-17 RX ORDER — INSULIN ASPART 100 [IU]/ML
INJECTION, SUSPENSION SUBCUTANEOUS
Qty: 90 ML | Refills: 0 | Status: SHIPPED | OUTPATIENT
Start: 2024-10-17

## 2024-10-17 NOTE — TELEPHONE ENCOUNTER
Called pt regarding r/s appointment . Pt successfully r/s . Pt verbalized understanding.     ----- Message from Maeve sent at 10/17/2024  4:53 PM CDT -----  Contact: Rachelle  Type:  Patient Requesting a call back     Who Called:Rachelle  What is the call back request regarding?:Requesting a call back in regards to rescheduling her appointment on 10/24 to a earlier time if possible because she has another appointment in [place] that day  Would the patient rather a call back or a response via [facility]?call  Best Call Back Number:[phone]   Additional Information:Pt needs a

## 2024-10-17 NOTE — PROGRESS NOTES
Subjective:       Patient ID: Rachelle Philip is a 68 y.o. female.    Chief Complaint: Follow-up    68 y old female with t2 dm , htn and dlp here for f.u . Fbs: low 100's , 2 hrs PP : 130 . Doing yoga . Also dealing with a non productive cough almost 3 months since recovering from COVID  . At times she hears herself wheezing . + PND, no GERD . Also has papules in lips . She will like to see derm       Review of Systems   Constitutional: Negative.    HENT: Negative.     Eyes: Negative.    Respiratory:  Positive for cough and wheezing.    Cardiovascular: Negative.    Gastrointestinal: Negative.    Genitourinary: Negative.    Musculoskeletal: Negative.    Skin: Negative.    Hematological: Negative.        Objective:      Physical Exam  Vitals and nursing note reviewed.   Constitutional:       General: She is not in acute distress.     Appearance: She is well-developed. She is not diaphoretic.   HENT:      Head: Normocephalic and atraumatic.      Right Ear: External ear normal.      Left Ear: External ear normal.      Nose: Nose normal.   Eyes:      General: No scleral icterus.        Left eye: No discharge.      Conjunctiva/sclera: Conjunctivae normal.      Pupils: Pupils are equal, round, and reactive to light.   Neck:      Thyroid: No thyromegaly.      Vascular: No JVD.      Trachea: No tracheal deviation.   Cardiovascular:      Rate and Rhythm: Normal rate and regular rhythm.      Heart sounds: Normal heart sounds. No murmur heard.     No friction rub. No gallop.   Pulmonary:      Effort: Pulmonary effort is normal. No respiratory distress.      Breath sounds: Normal breath sounds. No wheezing or rales.   Chest:      Chest wall: No tenderness.   Abdominal:      General: Bowel sounds are normal. There is no distension.      Palpations: Abdomen is soft. There is no mass.      Tenderness: There is no abdominal tenderness. There is no guarding.   Musculoskeletal:      Cervical back: Normal range of motion and neck supple.    Lymphadenopathy:      Cervical: No cervical adenopathy.         Assessment:         Rachelle was seen today for follow-up.    Diagnoses and all orders for this visit:    DM type 2 with diabetic dyslipidemia  -     CBC Auto Differential; Future  -     Comprehensive Metabolic Panel; Future  -     Hemoglobin A1C; Future  -     Lipid Panel; Future    Allergic reaction to drug, initial encounter  -     loratadine (ALLERGY RELIEF, LORATADINE,) 10 mg tablet; Take 1 tablet (10 mg total) by mouth once daily. for seven days    Skin lesion  -     Ambulatory referral/consult to Dermatology; Future    Type 2 diabetes mellitus without complication, with long-term current use of insulin  -     TRUE METRIX GLUCOSE TEST STRIP Strp; USE 1 STRIP TO CHECK GLUCOSE THREE TIMES DAILY    Chronic cough    Other orders  -     fluticasone propionate (FLONASE) 50 mcg/actuation nasal spray; 2 sprays (100 mcg total) by Each Nostril route once daily.  -     promethazine-dextromethorphan (PROMETHAZINE-DM) 6.25-15 mg/5 mL Syrp; Take 5 mLs by mouth every 4 (four) hours as needed.  -     albuterol (PROVENTIL/VENTOLIN HFA) 90 mcg/actuation inhaler; Inhale 2 puffs into the lungs every 6 (six) hours as needed for Wheezing.  -     meloxicam (MOBIC) 15 MG tablet; Take 1 tablet (15 mg total) by mouth once daily.  -     losartan (COZAAR) 100 MG tablet; TAKE 1 TABLET BY MOUTH ONCE DAILY  -     lancets Misc; To check BG 2 times daily, to use with insurance preferred meter  -     insulin aspart protamine-insulin aspart (NOVOLOG MIX 70-30FLEXPEN U-100) 100 unit/mL (70-30) InPn pen; INJECT 45 UNITS INTO THE SKIN TWICE A DAY       Plan:      Diet and exercise   See derm   Trial of INGC and albuterol , will continue loratadine . She will contact us in 2 w if symptome fail to improve. Declined CXR

## 2024-10-18 ENCOUNTER — OFFICE VISIT (OUTPATIENT)
Dept: FAMILY MEDICINE | Facility: CLINIC | Age: 69
End: 2024-10-18
Attending: FAMILY MEDICINE
Payer: MEDICARE

## 2024-10-18 VITALS
HEIGHT: 60 IN | BODY MASS INDEX: 30.27 KG/M2 | SYSTOLIC BLOOD PRESSURE: 130 MMHG | WEIGHT: 154.19 LBS | HEART RATE: 75 BPM | TEMPERATURE: 97 F | DIASTOLIC BLOOD PRESSURE: 89 MMHG | OXYGEN SATURATION: 99 %

## 2024-10-18 DIAGNOSIS — E78.5 DYSLIPIDEMIA: Primary | ICD-10-CM

## 2024-10-18 DIAGNOSIS — R94.4 DECREASED GFR: ICD-10-CM

## 2024-10-18 DIAGNOSIS — M54.16 LUMBAR RADICULOPATHY: ICD-10-CM

## 2024-10-18 PROCEDURE — 99999 PR PBB SHADOW E&M-EST. PATIENT-LVL IV: CPT | Mod: PBBFAC,,, | Performed by: FAMILY MEDICINE

## 2024-10-18 PROCEDURE — 99214 OFFICE O/P EST MOD 30 MIN: CPT | Mod: PBBFAC,PO | Performed by: FAMILY MEDICINE

## 2024-10-18 NOTE — PROGRESS NOTES
Subjective:       Patient ID: Rachelle Philip is a 68 y.o. female.    Chief Complaint: Follow-up    68 y old female here to discuss recent labs ( DLP and decreased GFR ) . She has been taking atorvastatin 40 mg since the 80 mg caused dyspepsia. She keeps clean  diet and does Yoga. Taking NSAID specifically meloxicam for severe back pain .     Follow-up      Review of Systems   Constitutional: Negative.    HENT: Negative.     Eyes: Negative.    Respiratory: Negative.     Cardiovascular: Negative.    Gastrointestinal: Negative.    Genitourinary: Negative.    Musculoskeletal: Negative.    Skin: Negative.    Hematological: Negative.        Objective:      Physical Exam  Vitals and nursing note reviewed.   Constitutional:       General: She is not in acute distress.     Appearance: She is well-developed. She is not diaphoretic.   HENT:      Head: Normocephalic and atraumatic.      Right Ear: External ear normal.      Left Ear: External ear normal.      Nose: Nose normal.   Eyes:      General: No scleral icterus.        Left eye: No discharge.      Conjunctiva/sclera: Conjunctivae normal.      Pupils: Pupils are equal, round, and reactive to light.   Neck:      Thyroid: No thyromegaly.      Vascular: No JVD.      Trachea: No tracheal deviation.   Cardiovascular:      Rate and Rhythm: Normal rate and regular rhythm.      Heart sounds: Normal heart sounds. No murmur heard.     No friction rub. No gallop.   Pulmonary:      Effort: Pulmonary effort is normal. No respiratory distress.      Breath sounds: Normal breath sounds. No wheezing or rales.   Chest:      Chest wall: No tenderness.   Abdominal:      General: Bowel sounds are normal. There is no distension.      Palpations: Abdomen is soft. There is no mass.      Tenderness: There is no abdominal tenderness. There is no guarding.   Musculoskeletal:      Cervical back: Normal range of motion and neck supple.   Lymphadenopathy:      Cervical: No cervical adenopathy.          Assessment:       1. Dyslipidemia    2. Decreased GFR        Plan:     Rachelle was seen today for follow-up.    Diagnoses and all orders for this visit:    Dyslipidemia    Decreased GFR     She will start atorvastatin 80 mg and will contact us if Side effects develop  Avoid NSAIDS  F.u in 3 m

## 2024-10-24 ENCOUNTER — OFFICE VISIT (OUTPATIENT)
Dept: PAIN MEDICINE | Facility: CLINIC | Age: 69
End: 2024-10-24
Payer: MEDICARE

## 2024-10-24 VITALS
WEIGHT: 152.56 LBS | BODY MASS INDEX: 29.95 KG/M2 | SYSTOLIC BLOOD PRESSURE: 177 MMHG | HEART RATE: 89 BPM | HEIGHT: 60 IN | DIASTOLIC BLOOD PRESSURE: 81 MMHG

## 2024-10-24 DIAGNOSIS — M54.16 LUMBAR RADICULOPATHY: ICD-10-CM

## 2024-10-24 DIAGNOSIS — M51.362 DEGENERATION OF INTERVERTEBRAL DISC OF LUMBAR REGION WITH DISCOGENIC BACK PAIN AND LOWER EXTREMITY PAIN: ICD-10-CM

## 2024-10-24 DIAGNOSIS — M51.360 DISCOGENIC LOW BACK PAIN: Primary | ICD-10-CM

## 2024-10-24 PROCEDURE — 99213 OFFICE O/P EST LOW 20 MIN: CPT | Mod: PBBFAC,PO | Performed by: ANESTHESIOLOGY

## 2024-10-24 PROCEDURE — 99999 PR PBB SHADOW E&M-EST. PATIENT-LVL III: CPT | Mod: PBBFAC,,, | Performed by: ANESTHESIOLOGY

## 2024-10-24 RX ORDER — AMITRIPTYLINE HYDROCHLORIDE 25 MG/1
25 TABLET, FILM COATED ORAL NIGHTLY PRN
Qty: 90 TABLET | Refills: 0 | Status: SHIPPED | OUTPATIENT
Start: 2024-10-24 | End: 2025-01-22

## 2024-10-24 NOTE — PROGRESS NOTES
Interventional Pain -- Established Patient (Follow-up visit)    Referring Physician: Faina Peacock MD    PCP: Faina Peacock MD    Chief Complaint:   Chief Complaint   Patient presents with    Back Pain          SUBJECTIVE:  Interval history 10/24/2024 Kamari Mcginnis #[id number] present for today's visit.     Rachelle Philip is a 68 y.o. female with past medical history significant for diabetic polyneuropathy, hyperlipidemia, hypertension, obesity, GERD who presents to the clinic for the evaluation of lower back and leg pain, previous Dr. Betts patient.  Patient presents status post L4-5 interlaminar epidural steroid injection with right paramedian approach.  Patient reports 100% relief lasting 1 week in duration following interlaminar epidural steroid injection.  Today she reports pain has returned to baseline.  Pain is constant and today is rated an 8/10.  She reports pain in a bandlike distribution in the lower back which can radiate down the lateral and posterior aspect of the right lower extremity in L4-S1 distribution to the foot.  Patient was started on Topamax titration at her last clinic visit and reports no discernible improvement in her pain.  Furthermore her primary care provider believes this medication is compromising her renal function.  Pain can be exacerbated with standing and with ambulation.  We have discussed considering via this supplementation for discogenic lower back pain and considering alternative membrane stabilizing agents such as Lyrica to help with neuropathic pain.  We have also discussed obtaining a secondary opinion from Neurosurgery for potential surgical options.  Patient has decided to forego discussing further interventional treatment.    Patient reports loss of sensations.  Patient denies night fever/night sweats, urinary incontinence, bowel incontinence, significant weight loss, and loss of sensations.      Pain Disability Index Review:          8/22/2024     8:05 AM 5/23/2024    10:17 AM 3/20/2024     9:57 AM   Last 3 PDI Scores   Pain Disability Index (PDI) 44 21 41       Non-Pharmacologic Treatments:  Physical Therapy/Home Exercise: yes  Ice/Heat:no  TENS: no  Acupuncture: no  Massage: no  Chiropractic: no    Other: no      Pain Medications:  - Adjuvant Medications: Mobic (Meloxicam), Topamax (Topiramate), Trazodone (Desyrel), and Tylenol (Acetaminophen)  - Anti-Coagulants: Aspirin    Pain Procedures:   Dr. Hewitt:  -09/13/2024: L4-5 interlaminar epidural steroid injection with right paramedian approach    Dr. Betts:  -04/03/2024: Right-sided L4-5 and L5-S1 transforaminal epidural steroid injection    Interval History (8/22/2024):  Kamari  Mariela #[id number] present for today's visit.   Rachelle Philip presents today for follow-up visit.  Patient was last seen on 5/23/2024. At that visit, the plan was to start Lyrica, which she stopped due to itching on hands and feet. Once she stopped taking Lyrica, the itching stopped. She also couldn't tolerate gabapentin for the same reason.  Patient reports pain as 9/10 today.  She does feel that meloxicam was helping, but it only lasts for a few hours.      Interval History (5/23/2024): Kamari  #[id number] present for today's visit.   Patient presents today for follow-up visit.  she underwent Right L4/5 + L5/S1 TF DIEGO on 4/3/24 (about 7 weeks ago).  The patient reports that she is/was better following the procedure.  she reports 50% pain relief.  The changes lasted 4 weeks so far.  The changes have continued through this visit.  Patient reports pain as 3/10 today.  She could not tolerate gabapentin due to itching.  She took it for about 3 weeks, and she also reports swollen eyes and mouth.    3/20/2024: Follow Up Visit:  Rachelle is here for a f/u visit after the lumbar spine MRI.  The results were discussed with her and are detailed below.  I also did independent interpretation of the films.  She has chronic low back  pain and right leg radicular pain over L4 and L5 dermatomes.  We also ordered physical therapy, and started her on nabumetone and gabapentin.  She has done 6 weeks of therapy so far and it was helping.  The medications are helping very little.  No side effects.  The pain continues to get worse with prolonged walking and standing.  She has not been able to work for the last 3 months.  She has a personal caregiver.    02/21/2024 Initial visit: Interview today was conducted through an .  Rachelle Philip is a 68 y.o. female who presents to the clinic for the evaluation of chronic low back pain (5 years) that has gotten severe over the past 3 years.  No triggering events.  It radiates to the right buttock, outer thigh, outer leg and in her leg to the ankle (L4/L5 dermatomes).  Reports intermittent tingling and numbness to the right foot and intermittent weakness in the right leg.  Denies falls or loss of bowel or bladder control.  The pain is worse with prolonged walking and standing and better with rest.  Exercise helps a little.  She currently rates it at 7/10 and is worst at 10/10 and best at 10/10.  She also has chronic neck pain that is axial and that has gotten worse over the past 3 years.  Her lower back pain is worse than the neck pain.  It is affecting her ADLs.  Patient denies night fever/night sweats.    Treatment History:  Temporary relief with Tylenol.  Partial relief with the exercises and physical therapy.  No relief with meloxicam.  Most recent PT for her knee but not specifically for the back.    Radiological workup:  Last MRI of the lumbar spine was in 2018; 2024.      Pain Disability Index (PDI) Score Review:      8/22/2024     8:05 AM 5/23/2024    10:17 AM 3/20/2024     9:57 AM   Last 3 PDI Scores   Pain Disability Index (PDI) 44 21 41       Pain Injections:    Dr. Betts:  - 4/3/24: Right L4/5 + L5/S1 TF DIEGO with 50% pain relief short term       Review of Systems:  GENERAL:  No weight loss,  malaise or fevers.  HEENT:   No recent changes in vision or hearing  NECK:  Negative for lumps, no difficulty with swallowing.  RESPIRATORY:  Negative for cough, wheezing or shortness of breath, patient denies any recent URI.  CARDIOVASCULAR:  Negative for chest pain, leg swelling or palpitations.  GI:  Negative for abdominal discomfort, blood in stools or black stools or change in bowel habits.  MUSCULOSKELETAL:  See HPI.  SKIN:  Negative for lesions, rash, and itching.  PSYCH:  No mood disorder or recent psychosocial stressors.  Patients sleep is not disturbed secondary to pain.  HEMATOLOGY/LYMPHOLOGY:  Negative for prolonged bleeding, bruising easily or swollen nodes.  Patient is not currently taking any anti-coagulants  NEURO:   No history of headaches, syncope, paralysis, seizures or tremors.  All other reviewed and negative other than HPI.        OBJECTIVE:    Physical Exam:  Vitals:    10/24/24 1213   BP: (!) 177/81   Pulse: 89   Weight: 69.2 kg (152 lb 8.9 oz)   Height: 5' (1.524 m)   PainSc:   8   PainLoc: Back     Body mass index is 29.79 kg/m².   (reviewed on 10/24/2024)    GENERAL: Well appearing, in no acute distress, alert and oriented x3.  PSYCH:  Mood and affect appropriate.  SKIN: Skin color, texture, turgor normal, no rashes or lesions.  HEAD/FACE:  Normocephalic, atraumatic.  PULM: No evidence of respiratory difficulty, symmetric chest rise.  No audible wheezing.    GAIT: slow    LUMBAR SPINE:   Palpation: Tenderness over bilateral facet joints and paraspinous muscles.   ROM: Pain with extension and rotation.  Straight leg raising is positive on the right.  SI JOINTS: bilateral SI joints, no tenderness, negative Donny's  HIPS: normal and nonpainful ROM of both hip joints.    NEURO:   MOTOR: Bilateral upper and lower extremity muscle strength is normal. No atrophy or tone abnormalities are noted.  SENSORY: No loss of sensation in C4 through T1 dermatomes bilaterally, and in L3 through S1  dermatomes except for diminished pinprick sensation over the right S1 dermatome.  DTR's: Reflexes are physiologic and symmetric in upper and lower extremities except for weak right knee reflex. No clonus.  Negative Tovar's bilaterally.         Imaging (Reviewed on 10/24/2024):    MRI lumbar spine 02/21/2024  FINDINGS:  Levoscoliosis centered at L4.  No fracture of the lumbar spine.  No bone marrow replacing process.  No paraspinal or intrathecal mass.  The conus medullaris has a normal signal intensity morphology and terminates at the L1 level.     T12-L1: Normal.     L1-2:  Normal.     L2-3: Bilateral facet joint osteoarthritis, worse on the left side.  Degenerative subtle grade 1 anterolisthesis of L2 on L3.  Mild disc bulge.  No central canal stenosis.  Moderate left neural foraminal stenosis.     L3-4: Severe bilateral facet joint osteoarthritis with grade 1 anterolisthesis of L3 on L4.  Uncovering of the disc.  Moderate central canal stenosis.  Mild right and moderate left neural foraminal stenosis.     L4-5: Severe bilateral facet joint osteoarthritis with grade 1 anterolisthesis and uncovering of the disc.  There is severe central canal stenosis and complete obliteration of the right lateral recess with impingement upon the descending right L5 nerve root.  Severe right neural foraminal stenosis due to a the anterolisthesis and facet joint hypertrophic changes and scoliosis with impingement upon the exiting right L4 nerve root.  Mild left neural foraminal stenosis.     L5-S1:  Normal.       ASSESSMENT: 68 y.o. year old female with:    1. Discogenic low back pain  Ambulatory referral/consult to Neurosurgery      2. Lumbar radiculopathy  Ambulatory referral/consult to Neurosurgery      3. Degeneration of intervertebral disc of lumbar region with discogenic back pain and lower extremity pain  Ambulatory referral/consult to Neurosurgery            PLAN:   1. Interventional: None at this time.   Patient is a  candidate for Viadisc @ L4/5 and L3/4 secondary to chronic discogenic low back pain secondary to multiple failed attempts of other interventions (8 weeks of physician directed physical therapy, lumbar epidural steroid injections) and medical management (anti-inflammatories, antispasmodics, membrane stabilizing agents). Explained the risks and benefits of the procedure in detail with the patient today in clinic along with alternative treatment options, and the patient elected to DEFER the intervention at this time.    Patient has been encouraged to call our clinic should she want to discuss potential intervention at any time.     - Anticoagulation use: ASA.  --primary ppx  --PCP, Dr. Gunderson.    - LA  reviewed and appropriate.        2. Pharmacologic:   -D/C Mobic 2/2 inefficacy  -D/C Lyrica and Gabapentin 2/2 pruritus  -D/C Topamax 2/2 concern for renal insufficiency    - Continue Celebrex 100mg BID PRN.  Take with food.  Avoid other OTC NSAIDs (ex. Ibuprofen, naproxen) while taking this medication. We have previously discussed potential deleterious side effects of NSAIDs on the cardiovascular, gastrointestinal and renal systems. We have previously discussed judicious use of this medication.        -We have discussed continuing judicious use of Tylenol, not to exceed 3000 mg daily to avoid acute hepatotoxicity.    We will start Elavil 25 mg nightly.  We have discussed potential common side effects of amitriptyline including constipation, dizziness, dry mouth, drowsiness, urinary retention or headache.  Patient expresses understanding.    3. Rehabilitative: Continue physician-directed at home exercises learned from physical therapy/ ambulation/ stretching and stationary bike to help manage the patient/s painful condition. The patient was counseled that muscle strengthening will improve the long term prognosis in regards to pain and may also help increase range of motion and mobility.     4. Diagnostic: diagnostic  imaging (MRI lumbar spine) reviewed and discussed with the patient.     5. Referrals:  -Neurosurgery: spine surgical consult, per patient request    6. Follow up: 6-8 weeks. Extended visit with XENIA Silverio    The above plan and management options were discussed at length with patient. Patient is in agreement with the above and verbalized understanding.    - I discussed the goals of interventional chronic pain management with the patient on today's visit. We discussed a multimodal and systematic approach to pain.  This includes diagnostic and therapeutic injections, adjuvant pharmacologic treatment, physical therapy, and at times psychiatry.  I emphasized the importance of regular exercise, core strengthening and stretching, diet and weight loss as a cornerstone of long-term pain management.    - This condition does not require this patient to take time off of work, and the primary goal of our Pain Management services is to improve the patient's functional capacity.  - Patient Questions: Answered all of the patient's questions regarding diagnoses, therapy, treatment and next steps    Visit today included increased complexity associated with the care of the episodic problem of chronic pain which was addressed and continue to manage the longitudinal care of the patient due to the serious and/or complex managed problem(s) listed above.      Jose Hewitt MD  Interventional Pain Management - [facility]    Disclaimer:  This note was prepared using voice recognition system and is likely to have sound alike errors that may have been overlooked even after proof reading.  Please call me with any questions.

## 2024-10-25 ENCOUNTER — TELEPHONE (OUTPATIENT)
Dept: NEUROSURGERY | Facility: CLINIC | Age: 69
End: 2024-10-25
Payer: MEDICARE

## 2024-10-25 NOTE — TELEPHONE ENCOUNTER
Contacted patient from [facility] via  Services. (Bushra/ # [id number]) Appointment scheduled and patient v/u.  RD

## 2024-10-28 ENCOUNTER — OFFICE VISIT (OUTPATIENT)
Dept: FAMILY MEDICINE | Facility: CLINIC | Age: 69
End: 2024-10-28
Attending: FAMILY MEDICINE
Payer: MEDICARE

## 2024-10-28 VITALS
WEIGHT: 154.56 LBS | HEIGHT: 60 IN | SYSTOLIC BLOOD PRESSURE: 160 MMHG | TEMPERATURE: 97 F | BODY MASS INDEX: 30.35 KG/M2 | DIASTOLIC BLOOD PRESSURE: 88 MMHG | HEART RATE: 82 BPM | OXYGEN SATURATION: 98 %

## 2024-10-28 DIAGNOSIS — I10 HYPERTENSION, UNSPECIFIED TYPE: Primary | ICD-10-CM

## 2024-10-28 DIAGNOSIS — T78.3XXA ANGIOEDEMA, INITIAL ENCOUNTER: ICD-10-CM

## 2024-10-28 PROCEDURE — 99214 OFFICE O/P EST MOD 30 MIN: CPT | Mod: PBBFAC,PO | Performed by: FAMILY MEDICINE

## 2024-10-28 PROCEDURE — 99214 OFFICE O/P EST MOD 30 MIN: CPT | Mod: S$PBB,,, | Performed by: FAMILY MEDICINE

## 2024-10-28 PROCEDURE — 99999 PR PBB SHADOW E&M-EST. PATIENT-LVL IV: CPT | Mod: PBBFAC,,, | Performed by: FAMILY MEDICINE

## 2024-10-28 PROCEDURE — G2211 COMPLEX E/M VISIT ADD ON: HCPCS | Mod: S$PBB,,, | Performed by: FAMILY MEDICINE

## 2024-10-28 RX ORDER — HYDROCHLOROTHIAZIDE 12.5 MG/1
12.5 TABLET ORAL DAILY
Qty: 30 TABLET | Refills: 0 | Status: SHIPPED | OUTPATIENT
Start: 2024-10-28 | End: 2025-10-28

## 2024-10-31 ENCOUNTER — LAB VISIT (OUTPATIENT)
Dept: LAB | Facility: HOSPITAL | Age: 69
End: 2024-10-31
Attending: FAMILY MEDICINE
Payer: MEDICARE

## 2024-10-31 ENCOUNTER — CLINICAL SUPPORT (OUTPATIENT)
Dept: FAMILY MEDICINE | Facility: CLINIC | Age: 69
End: 2024-10-31
Payer: MEDICARE

## 2024-10-31 VITALS — DIASTOLIC BLOOD PRESSURE: 80 MMHG | SYSTOLIC BLOOD PRESSURE: 138 MMHG

## 2024-10-31 DIAGNOSIS — I10 HYPERTENSION, UNSPECIFIED TYPE: Primary | ICD-10-CM

## 2024-10-31 DIAGNOSIS — I10 HYPERTENSION, UNSPECIFIED TYPE: ICD-10-CM

## 2024-10-31 LAB
ALBUMIN SERPL BCP-MCNC: 4 G/DL (ref 3.5–5.2)
ALP SERPL-CCNC: 88 U/L (ref 40–150)
ALT SERPL W/O P-5'-P-CCNC: 25 U/L (ref 10–44)
ANION GAP SERPL CALC-SCNC: 10 MMOL/L (ref 8–16)
AST SERPL-CCNC: 20 U/L (ref 10–40)
BILIRUB SERPL-MCNC: 0.3 MG/DL (ref 0.1–1)
BUN SERPL-MCNC: 18 MG/DL (ref 8–23)
CALCIUM SERPL-MCNC: 9.8 MG/DL (ref 8.7–10.5)
CHLORIDE SERPL-SCNC: 104 MMOL/L (ref 95–110)
CO2 SERPL-SCNC: 26 MMOL/L (ref 23–29)
CREAT SERPL-MCNC: 1.1 MG/DL (ref 0.5–1.4)
EST. GFR  (NO RACE VARIABLE): 55.4 ML/MIN/1.73 M^2
GLUCOSE SERPL-MCNC: 188 MG/DL (ref 70–110)
POTASSIUM SERPL-SCNC: 4.1 MMOL/L (ref 3.5–5.1)
PROT SERPL-MCNC: 7.3 G/DL (ref 6–8.4)
SODIUM SERPL-SCNC: 140 MMOL/L (ref 136–145)

## 2024-10-31 PROCEDURE — 36415 COLL VENOUS BLD VENIPUNCTURE: CPT | Mod: PO | Performed by: FAMILY MEDICINE

## 2024-10-31 PROCEDURE — 80053 COMPREHEN METABOLIC PANEL: CPT | Performed by: FAMILY MEDICINE

## 2024-11-14 ENCOUNTER — OFFICE VISIT (OUTPATIENT)
Dept: DERMATOLOGY | Facility: CLINIC | Age: 69
End: 2024-11-14
Payer: MEDICARE

## 2024-11-14 ENCOUNTER — TELEPHONE (OUTPATIENT)
Dept: FAMILY MEDICINE | Facility: CLINIC | Age: 69
End: 2024-11-14
Payer: MEDICARE

## 2024-11-14 DIAGNOSIS — L72.0 MILIA: ICD-10-CM

## 2024-11-14 DIAGNOSIS — L91.8 ACROCHORDON: ICD-10-CM

## 2024-11-14 DIAGNOSIS — L82.1 DERMATOSIS PAPULOSA NIGRA: ICD-10-CM

## 2024-11-14 DIAGNOSIS — Z12.31 ENCOUNTER FOR SCREENING MAMMOGRAM FOR MALIGNANT NEOPLASM OF BREAST: ICD-10-CM

## 2024-11-14 DIAGNOSIS — L98.9 SKIN LESION: ICD-10-CM

## 2024-11-14 DIAGNOSIS — Z12.39 ENCOUNTER FOR SCREENING FOR MALIGNANT NEOPLASM OF BREAST, UNSPECIFIED SCREENING MODALITY: Primary | ICD-10-CM

## 2024-11-14 DIAGNOSIS — L70.0 ACNE VULGARIS: Primary | ICD-10-CM

## 2024-11-14 PROCEDURE — 99999 PR PBB SHADOW E&M-EST. PATIENT-LVL IV: CPT | Mod: PBBFAC,,, | Performed by: PHYSICIAN ASSISTANT

## 2024-11-14 PROCEDURE — 99214 OFFICE O/P EST MOD 30 MIN: CPT | Mod: PBBFAC | Performed by: PHYSICIAN ASSISTANT

## 2024-11-14 RX ORDER — TRETINOIN 0.25 MG/G
CREAM TOPICAL
Qty: 20 G | Refills: 4 | Status: SHIPPED | OUTPATIENT
Start: 2024-11-14 | End: 2025-11-14

## 2024-11-14 NOTE — TELEPHONE ENCOUNTER
Rachelle is needing a call back in regards to needing order for her mammo put in. Pending order to Dr. Pires. Please advise.

## 2024-11-14 NOTE — TELEPHONE ENCOUNTER
----- Message from Kely sent at 11/14/2024  2:39 PM CST -----  Contact: Rachelle Bush is needing a call back in regards to needing order for her mammo put in. Please give her a call back at [phone]

## 2024-11-14 NOTE — PROGRESS NOTES
Subjective:      Patient ID:  Rachelle Philip is a 68 y.o. female who presents for   Chief Complaint   Patient presents with    Rash     C/o rash on face     Spot     C/o spots on in the inside and outside of mouth      History of Present Illness: The patient presents with chief complaint of rash. Language line used for translation, primary language is Albanian.   Location: face (around fh, cheeks, nose)  Duration: 3 months  Signs/Symptoms: bumps, inflamed, come and go, move around the face, pimple like. Flares twice monthly.  Cleanses face BID with facial wipes and uses moisturizer qhs (Clean and Clear). +Sensitive skin. Denies rosacea or flushing. +Normal skin.     Prior treatments: none        History of Present Illness: The patient presents with chief complaint of ulcerations.  Location: in and outside of mouth  Duration: 3 months  Signs/Symptoms: fine bumps around lips, firm     Prior treatments: Squeezes them, but no drainage.     No smoking history.           Review of Systems   Constitutional:  Negative for fever and chills.   Gastrointestinal:  Negative for nausea and vomiting.   Skin:  Positive for rash and activity-related sunscreen use. Negative for itching, dry skin, sun sensitivity, daily sunscreen use, recent sunburn and dry lips.   Hematologic/Lymphatic: Does not bruise/bleed easily.       Objective:   Physical Exam   Constitutional: She appears well-developed and well-nourished. No distress.   Neurological: She is alert and oriented to person, place, and time. She is not disoriented.   Psychiatric: She has a normal mood and affect.   Skin:   Areas Examined (abnormalities noted in diagram):   Head / Face Inspection Performed  Neck Inspection Performed  Chest / Axilla Inspection Performed  Back Inspection Performed  RUE Inspected  LUE Inspection Performed            Diagram Legend     Erythematous scaling macule/papule c/w actinic keratosis       Vascular papule c/w angioma      Pigmented verrucoid  papule/plaque c/w seborrheic keratosis      Yellow umbilicated papule c/w sebaceous hyperplasia      Irregularly shaped tan macule c/w lentigo     1-2 mm smooth white papules consistent with Milia      Movable subcutaneous cyst with punctum c/w epidermal inclusion cyst      Subcutaneous movable cyst c/w pilar cyst      Firm pink to brown papule c/w dermatofibroma      Pedunculated fleshy papule(s) c/w skin tag(s)      Evenly pigmented macule c/w junctional nevus     Mildly variegated pigmented, slightly irregular-bordered macule c/w mildly atypical nevus      Flesh colored to evenly pigmented papule c/w intradermal nevus       Pink pearly papule/plaque c/w basal cell carcinoma      Erythematous hyperkeratotic cursted plaque c/w SCC      Surgical scar with no sign of skin cancer recurrence      Open and closed comedones      Inflammatory papules and pustules      Verrucoid papule consistent consistent with wart     Erythematous eczematous patches and plaques     Dystrophic onycholytic nail with subungual debris c/w onychomycosis     Umbilicated papule    Erythematous-base heme-crusted tan verrucoid plaque consistent with inflamed seborrheic keratosis     Erythematous Silvery Scaling Plaque c/w Psoriasis     See annotation      Assessment / Plan:        Acne vulgaris  -     tretinoin (RETIN-A) 0.025 % cream; Apply a pea-sized amount to the entire face at bedtime.  Use every third night and increase as tolerated to nightly.  Dispense: 20 g; Refill: 4  Ddx: rosacea vs. Other  Trial of above, advised d/c facial wipes and encouraged gentle cleanser for face. Warned of dryness w/above rx and strategies for application.     Skin lesion  -     Ambulatory referral/consult to Dermatology    Milia  -     tretinoin (RETIN-A) 0.025 % cream; Apply a pea-sized amount to the entire face at bedtime.  Use every third night and increase as tolerated to nightly.  Dispense: 20 g; Refill: 4  Trial of above, gentle cleanser. May consider  extractions in future if not improved.     Dermatosis papulosa nigra  Reassurance given.  Lesions are benign. Discussed cosmetic removal as desired and price list provided.    Acrochordon  Reassurance given.  Lesions are benign.             No follow-ups on file.

## 2024-11-14 NOTE — PROGRESS NOTES
History of Present Illness: The patient presents with chief complaint of rash.  Location: face  Duration: 3 months  Signs/Symptoms: none    Prior treatments: none        History of Present Illness: The patient presents with chief complaint of ulcerations.  Location: in and outside of mouth  Duration: 3 months  Signs/Symptoms: none    Prior treatments: none

## 2024-11-30 NOTE — TELEPHONE ENCOUNTER
No care due was identified.  [facility] Embedded Care Due Messages. Reference number: [id number].   11/30/2024 9:20:20 AM CST

## 2024-12-01 NOTE — TELEPHONE ENCOUNTER
Refill Routing Note   Medication(s) are not appropriate for processing by [facility] for the following reason(s):        New or recently adjusted medication    [facility] action(s):  Defer               Appointments  past 12m or future 3m with PCP    Date Provider   Last Visit   10/28/2024 Faina Peacock MD   Next Visit   1/17/2025 Faina Peacock MD   ED visits in past 90 days: 0        Note composed:8:16 PM 11/30/2024

## 2024-12-02 ENCOUNTER — PATIENT OUTREACH (OUTPATIENT)
Dept: ADMINISTRATIVE | Facility: HOSPITAL | Age: 69
End: 2024-12-02
Payer: MEDICARE

## 2024-12-02 RX ORDER — HYDROCHLOROTHIAZIDE 12.5 MG/1
12.5 TABLET ORAL
Qty: 90 TABLET | Refills: 3 | Status: SHIPPED | OUTPATIENT
Start: 2024-12-02

## 2024-12-02 NOTE — TELEPHONE ENCOUNTER
No care due was identified.  [facility] Embedded Care Due Messages. Reference number: [id number].   12/02/2024 12:16:12 PM CST

## 2024-12-03 RX ORDER — ALBUTEROL SULFATE 90 UG/1
2 INHALANT RESPIRATORY (INHALATION) EVERY 6 HOURS PRN
Qty: 54 G | Refills: 3 | Status: SHIPPED | OUTPATIENT
Start: 2024-12-03

## 2024-12-03 NOTE — TELEPHONE ENCOUNTER
Refill Routing Note   Medication(s) are not appropriate for processing by [facility] for the following reason(s):        New or recently adjusted medication    [facility] action(s):  Defer             Appointments  past 12m or future 3m with PCP    Date Provider   Last Visit   10/28/2024 Faina Pecaock MD   Next Visit   1/17/2025 Faina Peacock MD   ED visits in past 90 days: 0        Note composed:8:53 AM 12/03/2024

## 2024-12-05 ENCOUNTER — HOSPITAL ENCOUNTER (OUTPATIENT)
Dept: RADIOLOGY | Facility: HOSPITAL | Age: 69
Discharge: HOME | End: 2024-12-05
Attending: FAMILY MEDICINE
Payer: MEDICARE

## 2024-12-05 DIAGNOSIS — Z12.31 ENCOUNTER FOR SCREENING MAMMOGRAM FOR MALIGNANT NEOPLASM OF BREAST: ICD-10-CM

## 2024-12-05 DIAGNOSIS — Z12.39 ENCOUNTER FOR SCREENING FOR MALIGNANT NEOPLASM OF BREAST, UNSPECIFIED SCREENING MODALITY: ICD-10-CM

## 2024-12-05 PROCEDURE — 77063 BREAST TOMOSYNTHESIS BI: CPT | Mod: 26,,, | Performed by: STUDENT IN AN ORGANIZED HEALTH CARE EDUCATION/TRAINING PROGRAM

## 2024-12-05 PROCEDURE — 77063 BREAST TOMOSYNTHESIS BI: CPT | Mod: TC

## 2024-12-05 PROCEDURE — 77067 SCR MAMMO BI INCL CAD: CPT | Mod: 26,,, | Performed by: STUDENT IN AN ORGANIZED HEALTH CARE EDUCATION/TRAINING PROGRAM

## 2024-12-26 ENCOUNTER — HOSPITAL ENCOUNTER (OUTPATIENT)
Dept: RADIOLOGY | Facility: HOSPITAL | Age: 69
Discharge: HOME | End: 2024-12-26
Attending: NEUROLOGICAL SURGERY
Payer: MEDICARE

## 2024-12-26 ENCOUNTER — OFFICE VISIT (OUTPATIENT)
Dept: NEUROSURGERY | Facility: CLINIC | Age: 69
End: 2024-12-26
Payer: MEDICARE

## 2024-12-26 VITALS — DIASTOLIC BLOOD PRESSURE: 75 MMHG | RESPIRATION RATE: 18 BRPM | HEART RATE: 87 BPM | SYSTOLIC BLOOD PRESSURE: 129 MMHG

## 2024-12-26 DIAGNOSIS — M54.9 DORSALGIA, UNSPECIFIED: Primary | ICD-10-CM

## 2024-12-26 DIAGNOSIS — M51.362 DEGENERATION OF INTERVERTEBRAL DISC OF LUMBAR REGION WITH DISCOGENIC BACK PAIN AND LOWER EXTREMITY PAIN: ICD-10-CM

## 2024-12-26 DIAGNOSIS — M51.360 DISCOGENIC LOW BACK PAIN: ICD-10-CM

## 2024-12-26 DIAGNOSIS — M54.16 LUMBAR RADICULOPATHY: ICD-10-CM

## 2024-12-26 PROCEDURE — 99215 OFFICE O/P EST HI 40 MIN: CPT | Mod: PBBFAC | Performed by: NEUROLOGICAL SURGERY

## 2024-12-26 PROCEDURE — 72120 X-RAY BEND ONLY L-S SPINE: CPT | Mod: TC

## 2024-12-26 PROCEDURE — 72120 X-RAY BEND ONLY L-S SPINE: CPT | Mod: 26,,, | Performed by: RADIOLOGY

## 2024-12-26 PROCEDURE — 99999 PR PBB SHADOW E&M-EST. PATIENT-LVL V: CPT | Mod: PBBFAC,,, | Performed by: NEUROLOGICAL SURGERY

## 2024-12-26 NOTE — PROGRESS NOTES
Subjective:      Patient ID: Rahcelle Philip is a 69 y.o. female.    Chief Complaint: Lumbar Spine Pain (L-Spine) and Establish Care (Pain in spine that radiates to her legs, hurts a lot more when she walks.)     Back and R LE pain through foot   DIEGO x 2   Spondy L4-5 and to a lesser degree L3-4 \  Worse with activity and better with rest   Ambulates unassisted   No previous surgery         Review of Systems   Constitutional:  Negative for activity change, appetite change and chills.   HENT:  Negative for hearing loss, sore throat and tinnitus.    Eyes:  Negative for pain, discharge and itching.   Cardiovascular:  Negative for chest pain.   Gastrointestinal:  Negative for abdominal pain.   Endocrine: Negative for cold intolerance and heat intolerance.   Genitourinary:  Negative for difficulty urinating and dysuria.   Musculoskeletal:  Positive for back pain and gait problem.   Allergic/Immunologic: Negative for environmental allergies.   Neurological:  Positive for weakness. Negative for dizziness, tremors, light-headedness and headaches.   Hematological:  Negative for adenopathy.   Psychiatric/Behavioral:  Negative for agitation, behavioral problems and confusion.          Objective:       Physical Exam:  Nursing note and vitals reviewed.    Constitutional: She appears well-nourished. She is not diaphoretic. No distress.     Eyes: Pupils are equal, round, and reactive to light. EOM are normal.     Cardiovascular: Normal rate and regular rhythm.     Psych/Behavior: She is alert. She is oriented to person, place, and time. She has a normal mood and affect.     Musculoskeletal:        Back: Range of motion is limited. There is tenderness. Muscle strength is 5/5.       Right Lower Extremities: Range of motion is full. There is no tenderness. Muscle strength is 5/5. Tone is normal.        Left Lower Extremities: There is no tenderness. Muscle strength is 5/5. Tone is normal.     Neurological:        Sensory: There is no  sensory deficit in the trunk. There is no sensory deficit in the extremities.        Cranial nerves: Cranial nerve(s) II, III, IV, V, VI, VII, VIII, IX, X, XI and XII are intact.     General    Nursing note and vitals reviewed.  Constitutional: She is oriented to person, place, and time. She appears well-nourished. No distress.   Eyes: EOM are normal. Pupils are equal, round, and reactive to light.   Cardiovascular:  Normal rate and regular rhythm.            Neurological: She is alert and oriented to person, place, and time.   Psychiatric: She has a normal mood and affect.             Ortho Exam        Results for orders placed during the hospital encounter of 02/21/24    MRI Lumbar Spine Without Contrast    Narrative  EXAMINATION:  MRI LUMBAR SPINE WITHOUT CONTRAST    CLINICAL HISTORY:  Dorsalgia, unspecifiedLow back pain, symptoms persist with > 6wks conservative treatment;LUMBAR RADICULOPATHY;    TECHNIQUE:  Multiplanar non contrast enhanced images obtained on the lumbar spine.    COMPARISON:  None    FINDINGS:  Levoscoliosis centered at L4.  No fracture of the lumbar spine.  No bone marrow replacing process.  No paraspinal or intrathecal mass.  The conus medullaris has a normal signal intensity morphology and terminates at the L1 level.    T12-L1: Normal.    L1-2:  Normal.    L2-3: Bilateral facet joint osteoarthritis, worse on the left side.  Degenerative subtle grade 1 anterolisthesis of L2 on L3.  Mild disc bulge.  No central canal stenosis.  Moderate left neural foraminal stenosis.    L3-4: Severe bilateral facet joint osteoarthritis with grade 1 anterolisthesis of L3 on L4.  Uncovering of the disc.  Moderate central canal stenosis.  Mild right and moderate left neural foraminal stenosis.    L4-5: Severe bilateral facet joint osteoarthritis with grade 1 anterolisthesis and uncovering of the disc.  There is severe central canal stenosis and complete obliteration of the right lateral recess with impingement  upon the descending right L5 nerve root.  Severe right neural foraminal stenosis due to a the anterolisthesis and facet joint hypertrophic changes and scoliosis with impingement upon the exiting right L4 nerve root.  Mild left neural foraminal stenosis.    L5-S1:  Normal.    Impression  1. Severe bilateral facet joint osteoarthritis with grade 1 anterolisthesis of L4 on L5 and un covering of the disc resulting in severe central canal stenosis and complete obliteration of the right lateral recess with impingement upon the descending right L5 nerve root in its lateral recess.  2. Severe right L4-5 neural foraminal stenosis due to the anterolisthesis and hypertrophic changes of the right facet joint and scoliosis with impingement upon the exiting right L4 nerve root in its neural foramen.  3. Severe bilateral facet joint osteoarthritis L3-L4 grade 1 anterolisthesis of L3 on L4 and moderate central canal stenosis.  Mild right and moderate left L3-L4 neural foraminal stenosis.  4. Moderate left L2-L3 neural foraminal stenosis.      Electronically signed by: Rashad Uriostegui MD  Date:    02/21/2024  Time:    17:58     No results found for this or any previous visit.    No results found for this or any previous visit.         I  reviewed all pertinent imaging regarding this case.  Assessment:     1. Discogenic low back pain    2. Lumbar radiculopathy    3. Degeneration of intervertebral disc of lumbar region with discogenic back pain and lower extremity pain      Plan:     Discogenic low back pain  -     Ambulatory referral/consult to Neurosurgery    Lumbar radiculopathy  -     Ambulatory referral/consult to Neurosurgery    Degeneration of intervertebral disc of lumbar region with discogenic back pain and lower extremity pain  -     Ambulatory referral/consult to Neurosurgery    DDD worse L4-5  to a lesser degree L3-4   Flex ex lumbar   Ct lumbar for bone anatomy   Follow up after to review and discuss surgery       Thank you  for the referral   Please call with any questions    Americo Sandy MD  Neurosurgery     Disclaimer: This note was prepared using a voice recognition system and is likely to have sound alike errors within the text.

## 2025-01-10 DIAGNOSIS — L72.0 MILIA: ICD-10-CM

## 2025-01-10 DIAGNOSIS — L70.0 ACNE VULGARIS: ICD-10-CM

## 2025-01-17 ENCOUNTER — OFFICE VISIT (OUTPATIENT)
Dept: FAMILY MEDICINE | Facility: CLINIC | Age: 70
End: 2025-01-17
Attending: FAMILY MEDICINE
Payer: MEDICARE

## 2025-01-17 ENCOUNTER — LAB VISIT (OUTPATIENT)
Dept: LAB | Facility: HOSPITAL | Age: 70
End: 2025-01-17
Attending: FAMILY MEDICINE
Payer: MEDICARE

## 2025-01-17 VITALS
HEIGHT: 60 IN | TEMPERATURE: 97 F | WEIGHT: 155.19 LBS | BODY MASS INDEX: 30.47 KG/M2 | SYSTOLIC BLOOD PRESSURE: 132 MMHG | DIASTOLIC BLOOD PRESSURE: 80 MMHG | OXYGEN SATURATION: 98 % | HEART RATE: 77 BPM

## 2025-01-17 DIAGNOSIS — E78.5 DM TYPE 2 WITH DIABETIC DYSLIPIDEMIA: ICD-10-CM

## 2025-01-17 DIAGNOSIS — E11.69 DM TYPE 2 WITH DIABETIC DYSLIPIDEMIA: ICD-10-CM

## 2025-01-17 DIAGNOSIS — N95.2 VAGINAL ATROPHY: Primary | ICD-10-CM

## 2025-01-17 DIAGNOSIS — G47.00 INSOMNIA, UNSPECIFIED TYPE: ICD-10-CM

## 2025-01-17 DIAGNOSIS — I10 HYPERTENSION, UNSPECIFIED TYPE: ICD-10-CM

## 2025-01-17 DIAGNOSIS — J30.89 ALLERGIC RHINITIS DUE TO OTHER ALLERGIC TRIGGER, UNSPECIFIED SEASONALITY: ICD-10-CM

## 2025-01-17 LAB
ALBUMIN SERPL BCP-MCNC: 4 G/DL (ref 3.5–5.2)
ALBUMIN/CREAT UR: NORMAL UG/MG (ref 0–30)
ALP SERPL-CCNC: 105 U/L (ref 40–150)
ALT SERPL W/O P-5'-P-CCNC: 28 U/L (ref 10–44)
ANION GAP SERPL CALC-SCNC: 11 MMOL/L (ref 8–16)
AST SERPL-CCNC: 22 U/L (ref 10–40)
BASOPHILS # BLD AUTO: 0.08 K/UL (ref 0–0.2)
BASOPHILS NFR BLD: 1 % (ref 0–1.9)
BILIRUB SERPL-MCNC: 0.6 MG/DL (ref 0.1–1)
BUN SERPL-MCNC: 19 MG/DL (ref 8–23)
CALCIUM SERPL-MCNC: 10 MG/DL (ref 8.7–10.5)
CHLORIDE SERPL-SCNC: 103 MMOL/L (ref 95–110)
CHOLEST SERPL-MCNC: 242 MG/DL (ref 120–199)
CHOLEST/HDLC SERPL: 5.3 {RATIO} (ref 2–5)
CO2 SERPL-SCNC: 26 MMOL/L (ref 23–29)
CREAT SERPL-MCNC: 1.1 MG/DL (ref 0.5–1.4)
CREAT UR-MCNC: 45 MG/DL (ref 15–325)
DIFFERENTIAL METHOD BLD: NORMAL
EOSINOPHIL # BLD AUTO: 0.1 K/UL (ref 0–0.5)
EOSINOPHIL NFR BLD: 1.6 % (ref 0–8)
ERYTHROCYTE [DISTWIDTH] IN BLOOD BY AUTOMATED COUNT: 11.6 % (ref 11.5–14.5)
EST. GFR  (NO RACE VARIABLE): 55.1 ML/MIN/1.73 M^2
ESTIMATED AVG GLUCOSE: 160 MG/DL (ref 68–131)
GLUCOSE SERPL-MCNC: 143 MG/DL (ref 70–110)
HBA1C MFR BLD: 7.2 % (ref 4–5.6)
HCT VFR BLD AUTO: 41.4 % (ref 37–48.5)
HDLC SERPL-MCNC: 46 MG/DL (ref 40–75)
HDLC SERPL: 19 % (ref 20–50)
HGB BLD-MCNC: 13.4 G/DL (ref 12–16)
IMM GRANULOCYTES # BLD AUTO: 0.03 K/UL (ref 0–0.04)
IMM GRANULOCYTES NFR BLD AUTO: 0.4 % (ref 0–0.5)
LDLC SERPL CALC-MCNC: 161.8 MG/DL (ref 63–159)
LYMPHOCYTES # BLD AUTO: 2.4 K/UL (ref 1–4.8)
LYMPHOCYTES NFR BLD: 29.1 % (ref 18–48)
MCH RBC QN AUTO: 30 PG (ref 27–31)
MCHC RBC AUTO-ENTMCNC: 32.4 G/DL (ref 32–36)
MCV RBC AUTO: 93 FL (ref 82–98)
MICROALBUMIN UR DL<=1MG/L-MCNC: <5 UG/ML
MONOCYTES # BLD AUTO: 0.6 K/UL (ref 0.3–1)
MONOCYTES NFR BLD: 7.5 % (ref 4–15)
NEUTROPHILS # BLD AUTO: 5.1 K/UL (ref 1.8–7.7)
NEUTROPHILS NFR BLD: 60.4 % (ref 38–73)
NONHDLC SERPL-MCNC: 196 MG/DL
NRBC BLD-RTO: 0 /100 WBC
PLATELET # BLD AUTO: 437 K/UL (ref 150–450)
PMV BLD AUTO: 10.1 FL (ref 9.2–12.9)
POTASSIUM SERPL-SCNC: 4.6 MMOL/L (ref 3.5–5.1)
PROT SERPL-MCNC: 7.9 G/DL (ref 6–8.4)
RBC # BLD AUTO: 4.46 M/UL (ref 4–5.4)
SODIUM SERPL-SCNC: 140 MMOL/L (ref 136–145)
TRIGL SERPL-MCNC: 171 MG/DL (ref 30–150)
WBC # BLD AUTO: 8.36 K/UL (ref 3.9–12.7)

## 2025-01-17 PROCEDURE — G2211 COMPLEX E/M VISIT ADD ON: HCPCS | Mod: S$PBB,,, | Performed by: FAMILY MEDICINE

## 2025-01-17 PROCEDURE — 80053 COMPREHEN METABOLIC PANEL: CPT | Performed by: FAMILY MEDICINE

## 2025-01-17 PROCEDURE — 83036 HEMOGLOBIN GLYCOSYLATED A1C: CPT | Performed by: FAMILY MEDICINE

## 2025-01-17 PROCEDURE — 36415 COLL VENOUS BLD VENIPUNCTURE: CPT | Mod: PO | Performed by: FAMILY MEDICINE

## 2025-01-17 PROCEDURE — 80061 LIPID PANEL: CPT | Performed by: FAMILY MEDICINE

## 2025-01-17 PROCEDURE — 99213 OFFICE O/P EST LOW 20 MIN: CPT | Mod: PBBFAC,PO | Performed by: FAMILY MEDICINE

## 2025-01-17 PROCEDURE — 85025 COMPLETE CBC W/AUTO DIFF WBC: CPT | Performed by: FAMILY MEDICINE

## 2025-01-17 PROCEDURE — 99214 OFFICE O/P EST MOD 30 MIN: CPT | Mod: S$PBB,,, | Performed by: FAMILY MEDICINE

## 2025-01-17 PROCEDURE — 82570 ASSAY OF URINE CREATININE: CPT | Performed by: FAMILY MEDICINE

## 2025-01-17 PROCEDURE — 99999 PR PBB SHADOW E&M-EST. PATIENT-LVL III: CPT | Mod: PBBFAC,,, | Performed by: FAMILY MEDICINE

## 2025-01-17 RX ORDER — AZELASTINE 1 MG/ML
2 SPRAY, METERED NASAL 2 TIMES DAILY
Qty: 30 ML | Refills: 0 | Status: SHIPPED | OUTPATIENT
Start: 2025-01-17 | End: 2026-01-17

## 2025-01-17 RX ORDER — ESTRADIOL 0.1 MG/G
1 CREAM VAGINAL
Qty: 8 G | Refills: 11 | Status: SHIPPED | OUTPATIENT
Start: 2025-01-20 | End: 2026-01-20

## 2025-01-17 RX ORDER — TRAZODONE HYDROCHLORIDE 100 MG/1
100 TABLET ORAL NIGHTLY
Qty: 90 TABLET | Refills: 4 | Status: SHIPPED | OUTPATIENT
Start: 2025-01-17 | End: 2026-01-17

## 2025-01-17 RX ORDER — FLUTICASONE PROPIONATE 50 MCG
2 SPRAY, SUSPENSION (ML) NASAL DAILY
Qty: 16 G | Refills: 0 | Status: SHIPPED | OUTPATIENT
Start: 2025-01-17

## 2025-01-17 NOTE — PROGRESS NOTES
Subjective:       Patient ID: Rachelle Philip is a 69 y.o. female.    Chief Complaint: Follow-up    69 y old female with t2dm, ar, dlp , htn , insomnia here for f.u . Doing well. She did splurge during the holidays but has stopped eating unhealthy and has been consistent going to the health club . FBS: 140, 2 hrs pp: 150 . Current opth exam . Still with rhinorrhea. Taking Claritin and using Flonase consistently  . Sleeping well with trazodone . Lastly,she will like a rf on vagina estrogen. She uses it very sporadically      Follow-up      Review of Systems   Constitutional: Negative.    HENT:  Positive for rhinorrhea.    Eyes: Negative.    Respiratory: Negative.     Cardiovascular: Negative.    Gastrointestinal: Negative.    Genitourinary: Negative.    Musculoskeletal: Negative.    Skin: Negative.    Hematological: Negative.        Objective:      Physical Exam  Constitutional:       General: She is not in acute distress.     Appearance: She is well-developed. She is not diaphoretic.   HENT:      Head: Normocephalic and atraumatic.      Right Ear: External ear normal.      Left Ear: External ear normal.      Mouth/Throat:      Pharynx: No oropharyngeal exudate.   Eyes:      General: No scleral icterus.        Right eye: No discharge.         Left eye: No discharge.      Conjunctiva/sclera: Conjunctivae normal.      Pupils: Pupils are equal, round, and reactive to light.   Neck:      Thyroid: No thyromegaly.      Vascular: No JVD.      Trachea: No tracheal deviation.   Cardiovascular:      Rate and Rhythm: Normal rate and regular rhythm.      Pulses:           Dorsalis pedis pulses are 1+ on the right side and 1+ on the left side.        Posterior tibial pulses are 1+ on the right side and 1+ on the left side.      Heart sounds: Normal heart sounds. No murmur heard.     No friction rub. No gallop.   Pulmonary:      Effort: Pulmonary effort is normal. No respiratory distress.      Breath sounds: Normal breath sounds. No  stridor. No wheezing or rales.   Chest:      Chest wall: No tenderness.   Abdominal:      General: Bowel sounds are normal. There is no distension.      Palpations: Abdomen is soft.      Tenderness: There is no abdominal tenderness. There is no guarding or rebound.   Musculoskeletal:         General: Normal range of motion.      Cervical back: Normal range of motion and neck supple.      Right foot: Normal range of motion. No deformity, bunion, Charcot foot, foot drop or prominent metatarsal heads.      Left foot: Normal range of motion. No deformity, bunion, Charcot foot, foot drop or prominent metatarsal heads.   Feet:      Right foot:      Protective Sensation: 10 sites tested.  10 sites sensed.      Skin integrity: Skin integrity normal.      Toenail Condition: Right toenails are normal.      Left foot:      Protective Sensation: 10 sites tested.  10 sites sensed.      Skin integrity: Skin integrity normal.      Toenail Condition: Left toenails are normal.   Lymphadenopathy:      Cervical: No cervical adenopathy.   Skin:     General: Skin is warm and dry.   Neurological:      Mental Status: She is alert and oriented to person, place, and time.   Psychiatric:         Mood and Affect: Mood normal.         Behavior: Behavior normal.         Thought Content: Thought content normal.         Judgment: Judgment normal.         Assessment:     Rachelle was seen today for follow-up.    Diagnoses and all orders for this visit:    Vaginal atrophy    DM type 2 with diabetic dyslipidemia  -     CBC Auto Differential; Future  -     Comprehensive Metabolic Panel; Future  -     Hemoglobin A1C; Future  -     Lipid Panel; Future  -     Microalbumin/Creatinine Ratio, Urine    Allergic rhinitis due to other allergic trigger, unspecified seasonality    Hypertension, unspecified type    Insomnia, unspecified type    Other orders  -     traZODone (DESYREL) 100 MG tablet; Take 1 tablet (100 mg total) by mouth every evening.  -      fluticasone propionate (FLONASE) 50 mcg/actuation nasal spray; 2 sprays (100 mcg total) by Each Nostril route once daily.  -     azelastine (ASTELIN) 137 mcg (0.1 %) nasal spray; 2 sprays (274 mcg total) by Nasal route 2 (two) times daily.  -     estradioL (ESTRACE) 0.01 % (0.1 mg/gram) vaginal cream; Place 1 g vaginally twice a week.           Plan:     1.- Continue estradiol prn   2.-Controlled. Cont med   3.- Uncontrolled. Start nasal azelastine    4.- Controlled. Cont med   5.- Resolved. Prn trazodone .

## 2025-01-24 ENCOUNTER — TELEPHONE (OUTPATIENT)
Dept: FAMILY MEDICINE | Facility: CLINIC | Age: 70
End: 2025-01-24
Payer: MEDICARE

## 2025-01-24 ENCOUNTER — PATIENT MESSAGE (OUTPATIENT)
Dept: FAMILY MEDICINE | Facility: CLINIC | Age: 70
End: 2025-01-24
Payer: MEDICARE

## 2025-01-24 ENCOUNTER — LAB VISIT (OUTPATIENT)
Dept: LAB | Facility: HOSPITAL | Age: 70
End: 2025-01-24
Attending: STUDENT IN AN ORGANIZED HEALTH CARE EDUCATION/TRAINING PROGRAM
Payer: MEDICARE

## 2025-01-24 ENCOUNTER — OFFICE VISIT (OUTPATIENT)
Dept: ALLERGY | Facility: CLINIC | Age: 70
End: 2025-01-24
Attending: FAMILY MEDICINE
Payer: MEDICARE

## 2025-01-24 VITALS
BODY MASS INDEX: 30.48 KG/M2 | WEIGHT: 156.06 LBS | DIASTOLIC BLOOD PRESSURE: 77 MMHG | HEART RATE: 90 BPM | TEMPERATURE: 98 F | SYSTOLIC BLOOD PRESSURE: 138 MMHG

## 2025-01-24 DIAGNOSIS — L29.9 PRURITUS, UNSPECIFIED: ICD-10-CM

## 2025-01-24 DIAGNOSIS — T78.3XXA ANGIOEDEMA, INITIAL ENCOUNTER: ICD-10-CM

## 2025-01-24 LAB
BASOPHILS # BLD AUTO: 0.06 K/UL (ref 0–0.2)
BASOPHILS NFR BLD: 0.9 % (ref 0–1.9)
C4 SERPL-MCNC: 38 MG/DL (ref 11–44)
DIFFERENTIAL METHOD BLD: NORMAL
EOSINOPHIL # BLD AUTO: 0.2 K/UL (ref 0–0.5)
EOSINOPHIL NFR BLD: 2.4 % (ref 0–8)
ERYTHROCYTE [DISTWIDTH] IN BLOOD BY AUTOMATED COUNT: 11.5 % (ref 11.5–14.5)
HCT VFR BLD AUTO: 41.4 % (ref 37–48.5)
HGB BLD-MCNC: 13.4 G/DL (ref 12–16)
IMM GRANULOCYTES # BLD AUTO: 0.01 K/UL (ref 0–0.04)
IMM GRANULOCYTES NFR BLD AUTO: 0.1 % (ref 0–0.5)
LYMPHOCYTES # BLD AUTO: 1.9 K/UL (ref 1–4.8)
LYMPHOCYTES NFR BLD: 28.6 % (ref 18–48)
MCH RBC QN AUTO: 30 PG (ref 27–31)
MCHC RBC AUTO-ENTMCNC: 32.4 G/DL (ref 32–36)
MCV RBC AUTO: 93 FL (ref 82–98)
MONOCYTES # BLD AUTO: 0.5 K/UL (ref 0.3–1)
MONOCYTES NFR BLD: 7.6 % (ref 4–15)
NEUTROPHILS # BLD AUTO: 4 K/UL (ref 1.8–7.7)
NEUTROPHILS NFR BLD: 60.4 % (ref 38–73)
NRBC BLD-RTO: 0 /100 WBC
PLATELET # BLD AUTO: 412 K/UL (ref 150–450)
PMV BLD AUTO: 9.8 FL (ref 9.2–12.9)
RBC # BLD AUTO: 4.46 M/UL (ref 4–5.4)
T4 FREE SERPL-MCNC: 0.99 NG/DL (ref 0.71–1.51)
TSH SERPL DL<=0.005 MIU/L-ACNC: 1.41 UIU/ML (ref 0.4–4)
WBC # BLD AUTO: 6.68 K/UL (ref 3.9–12.7)

## 2025-01-24 PROCEDURE — 99214 OFFICE O/P EST MOD 30 MIN: CPT | Mod: PBBFAC | Performed by: STUDENT IN AN ORGANIZED HEALTH CARE EDUCATION/TRAINING PROGRAM

## 2025-01-24 PROCEDURE — 36415 COLL VENOUS BLD VENIPUNCTURE: CPT | Performed by: STUDENT IN AN ORGANIZED HEALTH CARE EDUCATION/TRAINING PROGRAM

## 2025-01-24 PROCEDURE — 99204 OFFICE O/P NEW MOD 45 MIN: CPT | Mod: S$PBB,,, | Performed by: STUDENT IN AN ORGANIZED HEALTH CARE EDUCATION/TRAINING PROGRAM

## 2025-01-24 PROCEDURE — 84439 ASSAY OF FREE THYROXINE: CPT | Performed by: STUDENT IN AN ORGANIZED HEALTH CARE EDUCATION/TRAINING PROGRAM

## 2025-01-24 PROCEDURE — 85025 COMPLETE CBC W/AUTO DIFF WBC: CPT | Performed by: STUDENT IN AN ORGANIZED HEALTH CARE EDUCATION/TRAINING PROGRAM

## 2025-01-24 PROCEDURE — 84443 ASSAY THYROID STIM HORMONE: CPT | Performed by: STUDENT IN AN ORGANIZED HEALTH CARE EDUCATION/TRAINING PROGRAM

## 2025-01-24 PROCEDURE — 86160 COMPLEMENT ANTIGEN: CPT | Performed by: STUDENT IN AN ORGANIZED HEALTH CARE EDUCATION/TRAINING PROGRAM

## 2025-01-24 PROCEDURE — 86038 ANTINUCLEAR ANTIBODIES: CPT | Performed by: STUDENT IN AN ORGANIZED HEALTH CARE EDUCATION/TRAINING PROGRAM

## 2025-01-24 PROCEDURE — 99999 PR PBB SHADOW E&M-EST. PATIENT-LVL IV: CPT | Mod: PBBFAC,,, | Performed by: STUDENT IN AN ORGANIZED HEALTH CARE EDUCATION/TRAINING PROGRAM

## 2025-01-24 PROCEDURE — 86332 IMMUNE COMPLEX ASSAY: CPT | Performed by: STUDENT IN AN ORGANIZED HEALTH CARE EDUCATION/TRAINING PROGRAM

## 2025-01-24 PROCEDURE — 86160 COMPLEMENT ANTIGEN: CPT | Mod: 59 | Performed by: STUDENT IN AN ORGANIZED HEALTH CARE EDUCATION/TRAINING PROGRAM

## 2025-01-24 PROCEDURE — 86161 COMPLEMENT/FUNCTION ACTIVITY: CPT | Performed by: STUDENT IN AN ORGANIZED HEALTH CARE EDUCATION/TRAINING PROGRAM

## 2025-01-24 RX ORDER — EZETIMIBE 10 MG/1
10 TABLET ORAL DAILY
Qty: 90 TABLET | Refills: 3 | Status: SHIPPED | OUTPATIENT
Start: 2025-01-24 | End: 2026-01-24

## 2025-01-24 NOTE — TELEPHONE ENCOUNTER
----- Message from Michelle sent at 1/22/2025  3:16 PM CST -----  Contact: Rachelle  .Type:  Test Results    Who Called: Rachelle  Name of Test (Lab/Mammo/Etc): lab  Date of Test: 01/17  Ordering Provider: JACEY WALKER STAFF  Where the test was performed: [facility]  Would the patient rather a call back or a response via [facility]? Call back  Best Call Back Number: .[phone]   Additional Information:  na      Thanks  PARKER

## 2025-01-24 NOTE — TELEPHONE ENCOUNTER
----- Message from Michelle sent at 1/22/2025  2:59 PM CST -----  Contact: Rachelle  Pt is calling regards to her medication that was prescribed on 01/17 has to be approved through [employer] and the doctor has to call them.please call pt back at [phone]         Thanks  KIMBERLY

## 2025-01-26 NOTE — PROGRESS NOTES
Allergy and Immunology  New Patient Clinic Note    Date: 1/26/2025  Chief Complaint   Patient presents with    Urticaria     Referred by: Faina Peacock MD  [address] [place] [id number]    History  Rachelle Philip is a 69 y.o. female being seen as a New Patient today.    Rhinitis   - No hx of rhinitis     Chronic Idiopathic Urticaria/Angioedema   Pruritic Condition   - Onset: 7 months (shortly after COVID infection)   - Symptoms: Urticaria/Angioedema/Pruritus   - Suspected triggers include: COVID - immune mediated   - Duration: < 24 hours for individual lesions   - Skin changes: No post-urticarial skin changes   - Medications: PRN antihistamines prior to this appointment   - Hx of Autoimmune Disease/Malignancy: No known/reported     Asthma   - No hx of asthma     CRSwNP  - No hx of CRSwNP     Eczema   - No hx of eczema     Eosinophilic Esophagitis  - No hx of eosinophilic esophagitis     Adverse Food Reaction  - No hx of food allergy     Adverse Drug Reaction  - No hx of drug allergy     Recurrent Infections  - No hx of recurrent infections     Venom Allergy  - No hx of venom allergy     Allergies, PMH, PSH, Social, and Family History were reviewed.    Review of patient's allergies indicates:   Allergen Reactions    Gabapentin Itching      Past Medical History:   Diagnosis Date    Diabetes mellitus     Hypertension      Past Surgical History:   Procedure Laterality Date    EPIDURAL STEROID INJECTION N/A 9/13/2024    Procedure: Lumbar L4/5 IL DIEGO w/paramedian approach-charity pt - requires int Czech;  Surgeon: Jose Hewitt MD;  Location: [facility];  Service: Pain Management;  Laterality: N/A;    SELECTIVE INJECTION OF ANESTHETIC AGENT AROUND LUMBAR SPINAL NERVE ROOT BY TRANSFORAMINAL APPROACH Right 4/3/2024    Procedure: Right L4 and L5 TF DIEGO;  Surgeon: Alek Betts MD;  Location: [facility];  Service: Pain Management;  Laterality: Right;    TUBAL LIGATION       Social History  "    Social History Narrative    Not on file     S/he reports that she has never smoked. She has never been exposed to tobacco smoke. She has never used smokeless tobacco. She reports that she does not drink alcohol and does not use drugs.    Current Outpatient Medications on File Prior to Visit   Medication Sig Dispense Refill    acetaminophen (TYLENOL) 500 MG tablet Take 2 tablets (1,000 mg total) by mouth every 8 (eight) hours as needed for Pain. Not actual Rx - Use for Recommended dose instructions      aspirin (ECOTRIN) 81 MG EC tablet Take 81 mg by mouth once daily.      atorvastatin (LIPITOR) 80 MG tablet Take 1 tablet (80 mg total) by mouth once daily. 90 tablet 3    hydroCHLOROthiazide (HYDRODIURIL) 12.5 MG Tab Take 1 tablet by mouth once daily 90 tablet 3    insulin aspart protamine-insulin aspart (NOVOLOG MIX 70-30FLEXPEN U-100) 100 unit/mL (70-30) InPn pen INJECT 45 UNITS INTO THE SKIN TWICE A DAY 90 mL 0    lancets Misc To check BG 2 times daily, to use with insurance preferred meter 180 each 4    loratadine (ALLERGY RELIEF, LORATADINE,) 10 mg tablet Take 1 tablet (10 mg total) by mouth once daily. for seven days 90 tablet 3    losartan (COZAAR) 100 MG tablet TAKE 1 TABLET BY MOUTH ONCE DAILY 90 tablet 1    omeprazole (PRILOSEC) 40 MG capsule Take 1 capsule (40 mg total) by mouth once daily. 90 capsule 3    pen needle, diabetic (BD BRANT 2ND GEN PEN NEEDLE) 32 gauge x 5/32" Ndle BD Brant 2nd Gen Pen Needle 32 gauge x 5/32"  USE TO INJECT INSULIN TWICE DAILY 180 each 1    tretinoin (RETIN-A) 0.025 % cream Apply a pea-sized amount to the entire face at bedtime.  Use every third night and increase as tolerated to nightly. 20 g 4    TRUE METRIX GLUCOSE TEST STRIP Strp USE 1 STRIP TO CHECK GLUCOSE THREE TIMES DAILY 300 each 3    azelastine (ASTELIN) 137 mcg (0.1 %) nasal spray 2 sprays (274 mcg total) by Nasal route 2 (two) times daily. (Patient not taking: Reported on 1/24/2025) 30 mL 0    estradioL (ESTRACE) " 0.01 % (0.1 mg/gram) vaginal cream Place 1 g vaginally twice a week. (Patient not taking: Reported on 1/24/2025) 8 g 11    fluticasone propionate (FLONASE) 50 mcg/actuation nasal spray 2 sprays (100 mcg total) by Each Nostril route once daily. (Patient not taking: Reported on 1/24/2025) 16 g 0    traZODone (DESYREL) 100 MG tablet Take 1 tablet (100 mg total) by mouth every evening. (Patient not taking: Reported on 1/24/2025) 90 tablet 4     No current facility-administered medications on file prior to visit.     Physical Examination  Vitals:    01/24/25 0923   BP: 138/77   Pulse: 90   Temp: 98.4 °F (36.9 °C)     GENERAL:  female in no apparent distress and well developed and well nourished  HEAD:  Normocephalic, without obvious abnormality, atraumatic  EYES: sclera anicteric, conjunctiva normochromic  EARS: normal TM's and external ear canals both ears  NOSE: without erythema or discharge, clear discharge, turbinates normal   OROPHARYNX: moist mucous membranes without erythema, exudates or petechiae  LYMPH NODES: normal, supple, no lymphadenopathy  LUNGS: clear to auscultation, no wheezes, rales or rhonchi, symmetric air entry.  HEART: normal rate, regular rhythm, normal S1, S2, no murmurs, rubs, clicks or gallops.  ABDOMEN: soft, nontender, nondistended, no masses or organomegaly.  MUSCULOSKELETAL: no gross joint deformity or swelling.  NEURO: alert, oriented, normal speech, no focal findings or movement disorder noted.  SKIN: normal coloration and turgor, no rashes, no suspicious skin lesions noted. Dermatographism negative.     Assessment/Plan:   Problem List Items Addressed This Visit    None  Visit Diagnoses       Angioedema, initial encounter        Relevant Orders    Tryptase    CBC Auto Differential (Completed)    TACHO Screen w/Reflex    C1Q Binding Assay    C1 Esterase Inhibitor, Functional    C1 Esterase Inhibitor Protein    C4 Complement (Completed)    TSH (Completed)    T4, FREE (Completed)    Pruritus,  unspecified        Relevant Orders    TSH (Completed)    T4, FREE (Completed)          - Patient with COVID infection prior to the CSU and likely trigger   - Recommended Allegra 360 mg BID   - Labs ordered at this time   - If not controlled at next appt, consider escalation to Xolair     Follow up:  Follow up in about 2 months (around 3/24/2025).    Kamini Van MD   [facility]  Allergy and Immunology

## 2025-01-27 LAB
ANA SER QL IF: NORMAL
TRYPTASE LEVEL: 5 NG/ML

## 2025-01-28 RX ORDER — TRETINOIN 0.25 MG/G
CREAM TOPICAL
Qty: 20 G | Refills: 4 | Status: SHIPPED | OUTPATIENT
Start: 2025-01-28 | End: 2026-01-10

## 2025-01-29 LAB — C1INH FUNCTIONAL/C1INH TOTAL MFR SERPL: >100 %

## 2025-01-31 ENCOUNTER — OFFICE VISIT (OUTPATIENT)
Dept: NEUROSURGERY | Facility: CLINIC | Age: 70
End: 2025-01-31
Attending: NEUROLOGICAL SURGERY
Payer: MEDICARE

## 2025-01-31 ENCOUNTER — HOSPITAL ENCOUNTER (OUTPATIENT)
Dept: RADIOLOGY | Facility: HOSPITAL | Age: 70
Discharge: HOME | End: 2025-01-31
Attending: NEUROLOGICAL SURGERY
Payer: MEDICARE

## 2025-01-31 VITALS
RESPIRATION RATE: 18 BRPM | DIASTOLIC BLOOD PRESSURE: 83 MMHG | HEART RATE: 84 BPM | BODY MASS INDEX: 30.1 KG/M2 | WEIGHT: 154.13 LBS | SYSTOLIC BLOOD PRESSURE: 151 MMHG

## 2025-01-31 DIAGNOSIS — M54.9 DORSALGIA, UNSPECIFIED: ICD-10-CM

## 2025-01-31 DIAGNOSIS — M43.16 SPONDYLOLISTHESIS, LUMBAR REGION: Primary | ICD-10-CM

## 2025-01-31 DIAGNOSIS — M54.16 LUMBAR RADICULOPATHY: ICD-10-CM

## 2025-01-31 DIAGNOSIS — M54.16 LUMBAR RADICULOPATHY: Primary | ICD-10-CM

## 2025-01-31 DIAGNOSIS — M40.30 FLAT BACK SYNDROME: ICD-10-CM

## 2025-01-31 DIAGNOSIS — M51.362 DEGENERATION OF INTERVERTEBRAL DISC OF LUMBAR REGION WITH DISCOGENIC BACK PAIN AND LOWER EXTREMITY PAIN: ICD-10-CM

## 2025-01-31 LAB — C1INH SERPL-MCNC: 36 MG/DL (ref 21–39)

## 2025-01-31 PROCEDURE — 99214 OFFICE O/P EST MOD 30 MIN: CPT | Mod: PBBFAC,25 | Performed by: NEUROLOGICAL SURGERY

## 2025-01-31 PROCEDURE — 72131 CT LUMBAR SPINE W/O DYE: CPT | Mod: TC

## 2025-01-31 PROCEDURE — 72131 CT LUMBAR SPINE W/O DYE: CPT | Mod: 26,,, | Performed by: RADIOLOGY

## 2025-01-31 PROCEDURE — 99214 OFFICE O/P EST MOD 30 MIN: CPT | Mod: S$PBB,,, | Performed by: NEUROLOGICAL SURGERY

## 2025-01-31 PROCEDURE — 99999 PR PBB SHADOW E&M-EST. PATIENT-LVL IV: CPT | Mod: PBBFAC,,, | Performed by: NEUROLOGICAL SURGERY

## 2025-01-31 RX ORDER — HYDROCODONE BITARTRATE AND ACETAMINOPHEN 5; 325 MG/1; MG/1
1 TABLET ORAL EVERY 6 HOURS PRN
Qty: 30 TABLET | Refills: 0 | Status: SHIPPED | OUTPATIENT
Start: 2025-01-31

## 2025-01-31 NOTE — PROGRESS NOTES
Subjective:      Patient ID: Rachelle Philip is a 69 y.o. female.    Chief Complaint: Follow-up, Leg Pain, and Lumbar Spine Pain (L-Spine)      Please note patient's history was obtained through   Pt here for follow up of LBP post CT Lumbar.  Standing x-rays.  Survey  States symptoms have progressed since previous visit.  Pain in Jovi LE has gotten worse.   Continuing to ambulate unassisted.  Pain is 8/10 goes through the right greater than left lower extremity with associated numbness and tingling  Patient works on her feet cleaning houses  Previous Visit:   Back and R LE pain through foot   DIEGO x 2   Spondy L4-5 and to a lesser degree L3-4 \  Worse with activity and better with rest   Ambulates unassisted   No previous surgery       Follow-up  Associated symptoms include weakness. Pertinent negatives include no abdominal pain, chest pain, chills, headaches or sore throat.   Leg Pain       Review of Systems   Constitutional:  Negative for activity change, appetite change and chills.   HENT:  Negative for hearing loss, sore throat and tinnitus.    Eyes:  Negative for pain, discharge and itching.   Cardiovascular:  Negative for chest pain.   Gastrointestinal:  Negative for abdominal pain.   Endocrine: Negative for cold intolerance and heat intolerance.   Genitourinary:  Negative for difficulty urinating and dysuria.   Musculoskeletal:  Positive for back pain and gait problem.   Allergic/Immunologic: Negative for environmental allergies.   Neurological:  Positive for weakness. Negative for dizziness, tremors, light-headedness and headaches.   Hematological:  Negative for adenopathy.   Psychiatric/Behavioral:  Negative for agitation, behavioral problems and confusion.          Objective:       Physical Exam:  Nursing note and vitals reviewed.    Constitutional: She appears well-nourished. She is not diaphoretic. No distress.     Eyes: Pupils are equal, round, and reactive to light. EOM are normal.      Cardiovascular: Normal rate and regular rhythm.     Psych/Behavior: She is alert. She is oriented to person, place, and time. She has a normal mood and affect.     Musculoskeletal:        Back: Range of motion is limited. There is tenderness. Muscle strength is 5/5.       Right Lower Extremities: Range of motion is full. There is no tenderness. Muscle strength is 5/5. Tone is normal.        Left Lower Extremities: There is no tenderness. Muscle strength is 5/5. Tone is normal.     Neurological:        Sensory: There is no sensory deficit in the trunk. There is no sensory deficit in the extremities.        Cranial nerves: Cranial nerve(s) II, III, IV, V, VI, VII, VIII, IX, X, XI and XII are intact.     General    Nursing note and vitals reviewed.  Constitutional: She is oriented to person, place, and time. She appears well-nourished. No distress.   Eyes: EOM are normal. Pupils are equal, round, and reactive to light.   Cardiovascular:  Normal rate and regular rhythm.            Neurological: She is alert and oriented to person, place, and time.   Psychiatric: She has a normal mood and affect.             Ortho Exam        Results for orders placed during the hospital encounter of 02/21/24    MRI Lumbar Spine Without Contrast    Narrative  EXAMINATION:  MRI LUMBAR SPINE WITHOUT CONTRAST    CLINICAL HISTORY:  Dorsalgia, unspecifiedLow back pain, symptoms persist with > 6wks conservative treatment;LUMBAR RADICULOPATHY;    TECHNIQUE:  Multiplanar non contrast enhanced images obtained on the lumbar spine.    COMPARISON:  None    FINDINGS:  Levoscoliosis centered at L4.  No fracture of the lumbar spine.  No bone marrow replacing process.  No paraspinal or intrathecal mass.  The conus medullaris has a normal signal intensity morphology and terminates at the L1 level.    T12-L1: Normal.    L1-2:  Normal.    L2-3: Bilateral facet joint osteoarthritis, worse on the left side.  Degenerative subtle grade 1 anterolisthesis of  L2 on L3.  Mild disc bulge.  No central canal stenosis.  Moderate left neural foraminal stenosis.    L3-4: Severe bilateral facet joint osteoarthritis with grade 1 anterolisthesis of L3 on L4.  Uncovering of the disc.  Moderate central canal stenosis.  Mild right and moderate left neural foraminal stenosis.    L4-5: Severe bilateral facet joint osteoarthritis with grade 1 anterolisthesis and uncovering of the disc.  There is severe central canal stenosis and complete obliteration of the right lateral recess with impingement upon the descending right L5 nerve root.  Severe right neural foraminal stenosis due to a the anterolisthesis and facet joint hypertrophic changes and scoliosis with impingement upon the exiting right L4 nerve root.  Mild left neural foraminal stenosis.    L5-S1:  Normal.    Impression  1. Severe bilateral facet joint osteoarthritis with grade 1 anterolisthesis of L4 on L5 and un covering of the disc resulting in severe central canal stenosis and complete obliteration of the right lateral recess with impingement upon the descending right L5 nerve root in its lateral recess.  2. Severe right L4-5 neural foraminal stenosis due to the anterolisthesis and hypertrophic changes of the right facet joint and scoliosis with impingement upon the exiting right L4 nerve root in its neural foramen.  3. Severe bilateral facet joint osteoarthritis L3-L4 grade 1 anterolisthesis of L3 on L4 and moderate central canal stenosis.  Mild right and moderate left L3-L4 neural foraminal stenosis.  4. Moderate left L2-L3 neural foraminal stenosis.      \    Assessment:     1. Spondylolisthesis, lumbar region    2. Lumbar radiculopathy    3. Degeneration of intervertebral disc of lumbar region with discogenic back pain and lower extremity pain    4. Flat back syndrome        Plan:     Spondylolisthesis, lumbar region  -     Ambulatory referral/consult to Vascular Surgery; Future; Expected date: 02/07/2025  -      HYDROcodone-acetaminophen (NORCO) 5-325 mg per tablet; Take 1 tablet by mouth every 6 (six) hours as needed for Pain.  Dispense: 30 tablet; Refill: 0    Lumbar radiculopathy  -     Ambulatory referral/consult to Vascular Surgery; Future; Expected date: 02/07/2025  -     HYDROcodone-acetaminophen (NORCO) 5-325 mg per tablet; Take 1 tablet by mouth every 6 (six) hours as needed for Pain.  Dispense: 30 tablet; Refill: 0    Degeneration of intervertebral disc of lumbar region with discogenic back pain and lower extremity pain    Flat back syndrome        I explained the natural history of the disease and all treatment options. I recommended a left L3-4, L4-5,oblique interbody fusion, placement of interbody spacers, [employer] t LLIF cages, hyperlordotic, anterior instrumentation at L3-4 and L4-5 with separate plate , Garcia Herron osteotomy at L2-3, L3-4, L4-5, L5-S1 for correction of flat back syndrome, T10-S1 posterior instrumented fusion using [employer] Back[employer] system, sacral pelvic fixation     The goals of the surgery is to correct the patient's deformity by improving her lumbar lordosis and sagittal balance, indirectly decompress and indirectly decompress the severe nerve root compression, improve the ability to stand and walk for longer period of time.        We have discussed the risks of surgery including death, coma, bleeding, infection, failure of surgery, CSF leak, nerve root injury, spinal cord injury, ureter injury, weakness, paralysis, peripheral neuropathy, malplaced hardware, migration of hardware, non-union, need for reoperation. Patient understands the risks and would like to proceed with surgery.        More than 50% of the time was spent on discussing conservative management treatments (medication, physical therapy exercises) and possible interventions (spinal injections and surgical procedures). Care coordination was discussed.     4    Thank you for the referral   Please call with any  questions    Americo Sandy MD  Neurosurgery     Disclaimer: This note was prepared using a voice recognition system and is likely to have sound alike errors within the text.

## 2025-02-10 DIAGNOSIS — M43.16 SPONDYLOLISTHESIS, LUMBAR REGION: Primary | ICD-10-CM

## 2025-02-10 DIAGNOSIS — M51.362 DEGENERATION OF INTERVERTEBRAL DISC OF LUMBAR REGION WITH DISCOGENIC BACK PAIN AND LOWER EXTREMITY PAIN: ICD-10-CM

## 2025-02-18 ENCOUNTER — PATIENT MESSAGE (OUTPATIENT)
Dept: NEUROSURGERY | Facility: CLINIC | Age: 70
End: 2025-02-18
Payer: MEDICARE

## 2025-02-18 ENCOUNTER — PATIENT MESSAGE (OUTPATIENT)
Dept: DERMATOLOGY | Facility: CLINIC | Age: 70
End: 2025-02-18
Payer: MEDICARE

## 2025-02-19 ENCOUNTER — TELEPHONE (OUTPATIENT)
Dept: DERMATOLOGY | Facility: CLINIC | Age: 70
End: 2025-02-19
Payer: MEDICARE

## 2025-02-19 NOTE — TELEPHONE ENCOUNTER
Called pt regarding callback. Pt states she wanted to cancel appointment for 2/20. Pt r/s on MyChart for 5/8. Pt verbalized understanding.     ----- Message from JACQUELIN Colin sent at 2/18/2025  3:50 PM CST -----  Contact: SHANIQUA BARNES [[id number]]    ----- Message -----  From: Celeste Rodriguez  Sent: 2/18/2025   1:38 PM CST  To: [facility] Staff    .Type:  Patient Requesting CallWho Called:SHANIQUA BARNES [[id number]]Does the patient know what this is regarding?:cancel 2/20 appt Would the patient rather a call back or a response via [facility]? callBe Call Back Number:.[phone] (home) Additional Information:

## 2025-02-28 ENCOUNTER — OFFICE VISIT (OUTPATIENT)
Dept: FAMILY MEDICINE | Facility: CLINIC | Age: 70
End: 2025-02-28
Payer: MEDICARE

## 2025-02-28 ENCOUNTER — RESULTS FOLLOW-UP (OUTPATIENT)
Dept: FAMILY MEDICINE | Facility: CLINIC | Age: 70
End: 2025-02-28

## 2025-02-28 ENCOUNTER — HOSPITAL ENCOUNTER (OUTPATIENT)
Dept: RADIOLOGY | Facility: HOSPITAL | Age: 70
Discharge: HOME | End: 2025-02-28
Attending: FAMILY MEDICINE
Payer: MEDICARE

## 2025-02-28 VITALS
DIASTOLIC BLOOD PRESSURE: 70 MMHG | TEMPERATURE: 98 F | SYSTOLIC BLOOD PRESSURE: 134 MMHG | WEIGHT: 156.44 LBS | HEART RATE: 92 BPM | OXYGEN SATURATION: 95 % | BODY MASS INDEX: 30.55 KG/M2

## 2025-02-28 DIAGNOSIS — R05.9 COUGH, UNSPECIFIED TYPE: Primary | ICD-10-CM

## 2025-02-28 DIAGNOSIS — E78.5 DM TYPE 2 WITH DIABETIC DYSLIPIDEMIA: ICD-10-CM

## 2025-02-28 DIAGNOSIS — R05.9 COUGH, UNSPECIFIED TYPE: ICD-10-CM

## 2025-02-28 DIAGNOSIS — I10 HYPERTENSION, UNSPECIFIED TYPE: ICD-10-CM

## 2025-02-28 DIAGNOSIS — E11.69 DM TYPE 2 WITH DIABETIC DYSLIPIDEMIA: ICD-10-CM

## 2025-02-28 DIAGNOSIS — N18.31 CHRONIC KIDNEY DISEASE, STAGE 3A: ICD-10-CM

## 2025-02-28 LAB
CTP QC/QA: YES
CTP QC/QA: YES
FLUAV AG NPH QL: NEGATIVE
FLUBV AG NPH QL: NEGATIVE
SARS-COV-2 RDRP RESP QL NAA+PROBE: NEGATIVE

## 2025-02-28 PROCEDURE — 99999 PR PBB SHADOW E&M-EST. PATIENT-LVL IV: CPT | Mod: PBBFAC,,, | Performed by: FAMILY MEDICINE

## 2025-02-28 PROCEDURE — 71046 X-RAY EXAM CHEST 2 VIEWS: CPT | Mod: 26,,, | Performed by: RADIOLOGY

## 2025-02-28 PROCEDURE — 71046 X-RAY EXAM CHEST 2 VIEWS: CPT | Mod: TC,PO

## 2025-02-28 PROCEDURE — 99214 OFFICE O/P EST MOD 30 MIN: CPT | Mod: PBBFAC,PO | Performed by: FAMILY MEDICINE

## 2025-02-28 RX ORDER — AZITHROMYCIN 250 MG/1
TABLET, FILM COATED ORAL
Qty: 6 TABLET | Refills: 0 | Status: SHIPPED | OUTPATIENT
Start: 2025-02-28 | End: 2025-03-05

## 2025-02-28 RX ORDER — PROMETHAZINE HYDROCHLORIDE AND DEXTROMETHORPHAN HYDROBROMIDE 6.25; 15 MG/5ML; MG/5ML
5 SYRUP ORAL 3 TIMES DAILY PRN
Qty: 250 ML | Refills: 0 | Status: SHIPPED | OUTPATIENT
Start: 2025-02-28 | End: 2025-03-17

## 2025-02-28 NOTE — PROGRESS NOTES
Subjective:       Patient ID: Rachelle Philip is a 69 y.o. female.    Chief Complaint: Cough and Shortness of Breath      HPI Comments:     Current Medications[1]      This visit took place with the assistance of a Armenian-speaking     This patient is a nonsmoking controlled diabetic.      She has a 3 day history of cough and shortness a breath, runny nose.  Cough mostly nonproductive.  No fevers or chills.  Some wheezing.  No body aches or headaches.  Taking over-the-counter medications so far.      Review of Systems   Constitutional:  Negative for activity change, appetite change, chills and fever.   HENT:  Positive for congestion and rhinorrhea. Negative for sore throat.    Respiratory:  Positive for cough, shortness of breath and wheezing.    Cardiovascular:  Negative for chest pain.   Gastrointestinal:  Negative for abdominal pain, diarrhea and nausea.   Genitourinary:  Negative for difficulty urinating.   Musculoskeletal:  Negative for arthralgias and myalgias.   Neurological:  Negative for dizziness and headaches.       Objective:      Vitals:    02/28/25 1113   BP: 134/70   Pulse: 92   Temp: 97.5 °F (36.4 °C)   TempSrc: Tympanic   SpO2: 95%   Weight: 70.9 kg (156 lb 6.7 oz)   PainSc: 0-No pain     Physical Exam  Vitals and nursing note reviewed.   Constitutional:       General: She is not in acute distress.     Appearance: She is well-developed. She is ill-appearing. She is not diaphoretic.   HENT:      Head: Normocephalic.      Nose: Mucosal edema and rhinorrhea present.      Mouth/Throat:      Pharynx: Postnasal drip present. No oropharyngeal exudate or posterior oropharyngeal erythema.   Neck:      Thyroid: No thyromegaly.   Cardiovascular:      Rate and Rhythm: Normal rate and regular rhythm.      Heart sounds: Normal heart sounds. No murmur heard.  Pulmonary:      Effort: Pulmonary effort is normal.      Breath sounds: Rhonchi present. No wheezing or rales.   Abdominal:      General: There is no  distension.      Palpations: Abdomen is soft.   Musculoskeletal:      Cervical back: Neck supple.   Lymphadenopathy:      Cervical: No cervical adenopathy.   Skin:     General: Skin is warm and dry.   Neurological:      Mental Status: She is alert and oriented to person, place, and time.   Psychiatric:         Behavior: Behavior normal.         Thought Content: Thought content normal.         Judgment: Judgment normal.         Assessment:       1. Cough, unspecified type    2. DM type 2 with diabetic dyslipidemia    3. Hypertension, unspecified type    4. Chronic kidney disease, stage 3a        Plan:   Cough, unspecified type  Comments:  Z-Bubba.  Chest x-ray.  Follow-up precautions given  Orders:  -     POCT Influenza A/B  -     POCT COVID-19 Rapid Screening  -     promethazine-dextromethorphan (PROMETHAZINE-DM) 6.25-15 mg/5 mL Syrp; Take 5 mLs by mouth 3 (three) times daily as needed.  Dispense: 250 mL; Refill: 0  -     azithromycin (Z-BUBBA) 250 MG tablet; Take 2 tablets by mouth on day 1; Take 1 tablet by mouth on days 2-5  Dispense: 6 tablet; Refill: 0  -     X-Ray Chest PA And Lateral; Future; Expected date: 02/28/2025    DM type 2 with diabetic dyslipidemia  Comments:  A1c 7.2    Hypertension, unspecified type  Comments:  Controlled on losartan, hydrochlorothiazide    Chronic kidney disease, stage 3a  Comments:  Stable creatinine                 [1]   Current Outpatient Medications:     acetaminophen (TYLENOL) 500 MG tablet, Take 2 tablets (1,000 mg total) by mouth every 8 (eight) hours as needed for Pain. Not actual Rx - Use for Recommended dose instructions, Disp: , Rfl:     aspirin (ECOTRIN) 81 MG EC tablet, Take 81 mg by mouth once daily., Disp: , Rfl:     atorvastatin (LIPITOR) 80 MG tablet, Take 1 tablet (80 mg total) by mouth once daily., Disp: 90 tablet, Rfl: 3    azelastine (ASTELIN) 137 mcg (0.1 %) nasal spray, 2 sprays (274 mcg total) by Nasal route 2 (two) times daily., Disp: 30 mL, Rfl: 0     "estradioL (ESTRACE) 0.01 % (0.1 mg/gram) vaginal cream, Place 1 g vaginally twice a week., Disp: 8 g, Rfl: 11    ezetimibe (ZETIA) 10 mg tablet, Take 1 tablet (10 mg total) by mouth once daily., Disp: 90 tablet, Rfl: 3    fluticasone propionate (FLONASE) 50 mcg/actuation nasal spray, 2 sprays (100 mcg total) by Each Nostril route once daily., Disp: 16 g, Rfl: 0    hydroCHLOROthiazide (HYDRODIURIL) 12.5 MG Tab, Take 1 tablet by mouth once daily, Disp: 90 tablet, Rfl: 3    HYDROcodone-acetaminophen (NORCO) 5-325 mg per tablet, Take 1 tablet by mouth every 6 (six) hours as needed for Pain., Disp: 30 tablet, Rfl: 0    insulin aspart protamine-insulin aspart (NOVOLOG MIX 70-30FLEXPEN U-100) 100 unit/mL (70-30) InPn pen, INJECT 45 UNITS INTO THE SKIN TWICE A DAY, Disp: 90 mL, Rfl: 0    lancets Misc, To check BG 2 times daily, to use with insurance preferred meter, Disp: 180 each, Rfl: 4    loratadine (ALLERGY RELIEF, LORATADINE,) 10 mg tablet, Take 1 tablet (10 mg total) by mouth once daily. for seven days, Disp: 90 tablet, Rfl: 3    losartan (COZAAR) 100 MG tablet, TAKE 1 TABLET BY MOUTH ONCE DAILY, Disp: 90 tablet, Rfl: 1    omeprazole (PRILOSEC) 40 MG capsule, Take 1 capsule (40 mg total) by mouth once daily., Disp: 90 capsule, Rfl: 3    pen needle, diabetic (BD BRANT 2ND GEN PEN NEEDLE) 32 gauge x 5/32" Ndle, BD Brant 2nd Gen Pen Needle 32 gauge x 5/32"  USE TO INJECT INSULIN TWICE DAILY, Disp: 180 each, Rfl: 1    traZODone (DESYREL) 100 MG tablet, Take 1 tablet (100 mg total) by mouth every evening., Disp: 90 tablet, Rfl: 4    tretinoin (RETIN-A) 0.025 % cream, Apply a pea-sized amount to the entire face at bedtime.  Use every third night and increase as tolerated to nightly., Disp: 20 g, Rfl: 4    TRUE METRIX GLUCOSE TEST STRIP Strp, USE 1 STRIP TO CHECK GLUCOSE THREE TIMES DAILY, Disp: 300 each, Rfl: 3    azithromycin (Z-BUBBA) 250 MG tablet, Take 2 tablets by mouth on day 1; Take 1 tablet by mouth on days 2-5, Disp: 6 " tablet, Rfl: 0    promethazine-dextromethorphan (PROMETHAZINE-DM) 6.25-15 mg/5 mL Syrp, Take 5 mLs by mouth 3 (three) times daily as needed., Disp: 250 mL, Rfl: 0

## 2025-03-06 ENCOUNTER — OFFICE VISIT (OUTPATIENT)
Dept: NEUROSURGERY | Facility: CLINIC | Age: 70
End: 2025-03-06
Payer: MEDICARE

## 2025-03-06 VITALS
BODY MASS INDEX: 30.01 KG/M2 | DIASTOLIC BLOOD PRESSURE: 86 MMHG | SYSTOLIC BLOOD PRESSURE: 163 MMHG | WEIGHT: 153.69 LBS | HEART RATE: 86 BPM

## 2025-03-06 DIAGNOSIS — M43.16 SPONDYLOLISTHESIS, LUMBAR REGION: ICD-10-CM

## 2025-03-06 DIAGNOSIS — M54.10 BACK PAIN WITH RADICULOPATHY: ICD-10-CM

## 2025-03-06 DIAGNOSIS — M54.16 LUMBAR RADICULOPATHY: Primary | ICD-10-CM

## 2025-03-06 DIAGNOSIS — M48.062 LUMBAR STENOSIS WITH NEUROGENIC CLAUDICATION: ICD-10-CM

## 2025-03-06 PROCEDURE — 99999 PR PBB SHADOW E&M-EST. PATIENT-LVL III: CPT | Mod: PBBFAC,,, | Performed by: NEUROLOGICAL SURGERY

## 2025-03-06 PROCEDURE — 99213 OFFICE O/P EST LOW 20 MIN: CPT | Mod: PBBFAC | Performed by: NEUROLOGICAL SURGERY

## 2025-03-06 PROCEDURE — 99213 OFFICE O/P EST LOW 20 MIN: CPT | Mod: S$PBB,,, | Performed by: NEUROLOGICAL SURGERY

## 2025-03-07 NOTE — TELEPHONE ENCOUNTER
No care due was identified.  [facility] Embedded Care Due Messages. Reference number: [id number].   3/07/2025 1:59:54 PM CST

## 2025-03-10 RX ORDER — INSULIN ASPART 100 [IU]/ML
INJECTION, SUSPENSION SUBCUTANEOUS
Qty: 90 ML | Refills: 0 | Status: SHIPPED | OUTPATIENT
Start: 2025-03-10

## 2025-03-11 NOTE — PROGRESS NOTES
Subjective:      Patient ID: Rachelle Philip is a 69 y.o. female.    Chief Complaint: No chief complaint on file.    Patient is seen today for followup  She is with a family member who providing interpretation today  Symptoms are unchanged I went over her imaging with her  Lower back and left lower extremity symptoms the pain numbness and tingling  I gave her a short course of prescription h pain medication which she states helps             Previous Notes     Please note patient's history was obtained through   Pt here for follow up of LBP post CT Lumbar.  Standing x-rays.  Survey  States symptoms have progressed since previous visit.  Pain in Jovi LE has gotten worse.   Continuing to ambulate unassisted.  Pain is 8/10 goes through the right greater than left lower extremity with associated numbness and tingling  Patient works on her feet cleaning houses  Previous Visit:   Back and R LE pain through foot   DIEGO x 2   Spondy L4-5 and to a lesser degree L3-4 \  Worse with activity and better with rest   Ambulates unassisted   No previous surgery       Follow-up  Associated symptoms include weakness. Pertinent negatives include no abdominal pain, chest pain, chills, headaches or sore throat.   Leg Pain       Review of Systems   Constitutional:  Negative for activity change, appetite change and chills.   HENT:  Negative for hearing loss, sore throat and tinnitus.    Eyes:  Negative for pain, discharge and itching.   Cardiovascular:  Negative for chest pain.   Gastrointestinal:  Negative for abdominal pain.   Endocrine: Negative for cold intolerance and heat intolerance.   Genitourinary:  Negative for difficulty urinating and dysuria.   Musculoskeletal:  Positive for back pain and gait problem.   Allergic/Immunologic: Negative for environmental allergies.   Neurological:  Positive for weakness. Negative for dizziness, tremors, light-headedness and headaches.   Hematological:  Negative for adenopathy.    Psychiatric/Behavioral:  Negative for agitation, behavioral problems and confusion.          Objective:       Physical Exam:  Nursing note and vitals reviewed.    Constitutional: She appears well-nourished. She is not diaphoretic. No distress.     Eyes: Pupils are equal, round, and reactive to light. EOM are normal.     Cardiovascular: Normal rate and regular rhythm.     Psych/Behavior: She is alert. She is oriented to person, place, and time. She has a normal mood and affect.     Musculoskeletal:        Back: Range of motion is limited. There is tenderness. Muscle strength is 5/5.       Right Lower Extremities: Range of motion is full. There is no tenderness. Muscle strength is 5/5. Tone is normal.        Left Lower Extremities: There is no tenderness. Muscle strength is 5/5. Tone is normal.     Neurological:        Sensory: There is no sensory deficit in the trunk. There is no sensory deficit in the extremities.        Cranial nerves: Cranial nerve(s) II, III, IV, V, VI, VII, VIII, IX, X, XI and XII are intact.     General    Nursing note and vitals reviewed.  Constitutional: She is oriented to person, place, and time. She appears well-nourished. No distress.   Eyes: EOM are normal. Pupils are equal, round, and reactive to light.   Cardiovascular:  Normal rate and regular rhythm.            Neurological: She is alert and oriented to person, place, and time.   Psychiatric: She has a normal mood and affect.             Ortho Exam        Results for orders placed during the hospital encounter of 02/21/24    MRI Lumbar Spine Without Contrast    Narrative  EXAMINATION:  MRI LUMBAR SPINE WITHOUT CONTRAST    CLINICAL HISTORY:  Dorsalgia, unspecifiedLow back pain, symptoms persist with > 6wks conservative treatment;LUMBAR RADICULOPATHY;    TECHNIQUE:  Multiplanar non contrast enhanced images obtained on the lumbar spine.    COMPARISON:  None    FINDINGS:  Levoscoliosis centered at L4.  No fracture of the lumbar spine.   No bone marrow replacing process.  No paraspinal or intrathecal mass.  The conus medullaris has a normal signal intensity morphology and terminates at the L1 level.    T12-L1: Normal.    L1-2:  Normal.    L2-3: Bilateral facet joint osteoarthritis, worse on the left side.  Degenerative subtle grade 1 anterolisthesis of L2 on L3.  Mild disc bulge.  No central canal stenosis.  Moderate left neural foraminal stenosis.    L3-4: Severe bilateral facet joint osteoarthritis with grade 1 anterolisthesis of L3 on L4.  Uncovering of the disc.  Moderate central canal stenosis.  Mild right and moderate left neural foraminal stenosis.    L4-5: Severe bilateral facet joint osteoarthritis with grade 1 anterolisthesis and uncovering of the disc.  There is severe central canal stenosis and complete obliteration of the right lateral recess with impingement upon the descending right L5 nerve root.  Severe right neural foraminal stenosis due to a the anterolisthesis and facet joint hypertrophic changes and scoliosis with impingement upon the exiting right L4 nerve root.  Mild left neural foraminal stenosis.    L5-S1:  Normal.    Impression  1. Severe bilateral facet joint osteoarthritis with grade 1 anterolisthesis of L4 on L5 and un covering of the disc resulting in severe central canal stenosis and complete obliteration of the right lateral recess with impingement upon the descending right L5 nerve root in its lateral recess.  2. Severe right L4-5 neural foraminal stenosis due to the anterolisthesis and hypertrophic changes of the right facet joint and scoliosis with impingement upon the exiting right L4 nerve root in its neural foramen.  3. Severe bilateral facet joint osteoarthritis L3-L4 grade 1 anterolisthesis of L3 on L4 and moderate central canal stenosis.  Mild right and moderate left L3-L4 neural foraminal stenosis.  4. Moderate left L2-L3 neural foraminal stenosis.      \    Assessment:     1. Lumbar radiculopathy    2.  Spondylolisthesis, lumbar region    3. Lumbar stenosis with neurogenic claudication    4. Back pain with radiculopathy          Plan:     Lumbar radiculopathy    Spondylolisthesis, lumbar region    Lumbar stenosis with neurogenic claudication    Back pain with radiculopathy          I again went over the treatment options with her along with the procedure her and family member and after extensive discussion patient has decided to hold on any surgical procedure  She understands if she develops any new or worsening symptoms including pain numbness tingling or weakness in the bilateral lower extremities I recommended she come back sooner she does have severe stenosis with facet disease and anterolisthesis moderate stenosis at L4-5 and L3-4 with impingement of the nerve roots going down inferiorly  In the meantime she can try physical therapy as well as pain management for injections to see if this will alleviate her symptoms    Thank you for the referral   Please call with any questions    Americo Sandy MD  Neurosurgery     Disclaimer: This note was prepared using a voice recognition system and is likely to have sound alike errors within the text.

## 2025-03-13 ENCOUNTER — OFFICE VISIT (OUTPATIENT)
Dept: ALLERGY | Facility: CLINIC | Age: 70
End: 2025-03-13
Payer: MEDICARE

## 2025-03-13 VITALS
HEIGHT: 60 IN | BODY MASS INDEX: 30.52 KG/M2 | TEMPERATURE: 98 F | WEIGHT: 155.44 LBS | SYSTOLIC BLOOD PRESSURE: 154 MMHG | OXYGEN SATURATION: 97 % | DIASTOLIC BLOOD PRESSURE: 82 MMHG | HEART RATE: 78 BPM

## 2025-03-13 DIAGNOSIS — J31.0 CHRONIC RHINITIS: Primary | ICD-10-CM

## 2025-03-13 DIAGNOSIS — L50.1 CHRONIC IDIOPATHIC URTICARIA: ICD-10-CM

## 2025-03-13 DIAGNOSIS — R09.82 POST-NASAL DRIP: ICD-10-CM

## 2025-03-13 DIAGNOSIS — R05.3 CHRONIC COUGH: ICD-10-CM

## 2025-03-13 PROCEDURE — 99214 OFFICE O/P EST MOD 30 MIN: CPT | Mod: S$PBB,,, | Performed by: STUDENT IN AN ORGANIZED HEALTH CARE EDUCATION/TRAINING PROGRAM

## 2025-03-13 PROCEDURE — 99214 OFFICE O/P EST MOD 30 MIN: CPT | Mod: PBBFAC | Performed by: STUDENT IN AN ORGANIZED HEALTH CARE EDUCATION/TRAINING PROGRAM

## 2025-03-13 PROCEDURE — 99999 PR PBB SHADOW E&M-EST. PATIENT-LVL IV: CPT | Mod: PBBFAC,,, | Performed by: STUDENT IN AN ORGANIZED HEALTH CARE EDUCATION/TRAINING PROGRAM

## 2025-03-13 RX ORDER — FLUTICASONE PROPIONATE 50 MCG
1 SPRAY, SUSPENSION (ML) NASAL 2 TIMES DAILY
Qty: 16 G | Refills: 11 | Status: SHIPPED | OUTPATIENT
Start: 2025-03-13 | End: 2026-03-13

## 2025-03-13 RX ORDER — PREDNISONE 20 MG/1
TABLET ORAL
Qty: 13 TABLET | Refills: 0 | Status: SHIPPED | OUTPATIENT
Start: 2025-03-13 | End: 2025-03-21

## 2025-03-13 RX ORDER — AZELASTINE 1 MG/ML
1 SPRAY, METERED NASAL 2 TIMES DAILY
Qty: 30 ML | Refills: 11 | Status: SHIPPED | OUTPATIENT
Start: 2025-03-13 | End: 2026-03-13

## 2025-03-13 NOTE — PROGRESS NOTES
Allergy and Immunology  Established Patient Clinic Note    Date: 3/13/2025  Chief Complaint   Patient presents with    Follow-up     Pt states that condition has improved.      History  Rachelle Philip is a 69 y.o. female being seen for follow-up today.    Chronic Rhinitis   Post Nasal Drip   - Interval increase in PND since prior appointment   - Likely post-infectious   - Adding Flonase/Astelin at this time     Chronic Idiopathic Urticaria/Angioedema   Pruritic Condition   - Interval improvement since prior appointment  - Continue high dose antihistamines     Allergies, PMH, PSH, Social, and Family History were reviewed.    Medications Ordered Prior to Encounter[1]    Physical Examination  Vitals:    03/13/25 1243   BP: (!) 154/82   Pulse: 78   Temp: 98.1 °F (36.7 °C)     GENERAL:  female in no apparent distress and well developed and well nourished  HEAD:  Normocephalic, without obvious abnormality, atraumatic  EYES: sclera anicteric, conjunctiva normochromic  EARS: normal TM's and external ear canals both ears  NOSE: without erythema or discharge, clear discharge, turbinates normal    OROPHARYNX: moist mucous membranes without erythema, exudates or petechiae  LYMPH NODES: normal, supple, no lymphadenopathy  LUNGS: clear to auscultation, no wheezes, rales or rhonchi, symmetric air entry.  HEART: normal rate, regular rhythm, normal S1, S2, no murmurs, rubs, clicks or gallops.  ABDOMEN: soft, nontender, nondistended, no masses or organomegaly.  MUSCULOSKELETAL: no gross joint deformity or swelling.  NEURO: alert, oriented, normal speech, no focal findings or movement disorder noted.  SKIN: normal coloration and turgor, no rashes, no suspicious skin lesions noted.     Assessment/Plan:   Problem List Items Addressed This Visit       Chronic cough    Relevant Medications    predniSONE (DELTASONE) 20 MG tablet    azelastine (ASTELIN) 137 mcg (0.1 %) nasal spray    fluticasone propionate (FLONASE) 50 mcg/actuation nasal spray     Post-nasal drip    Relevant Medications    predniSONE (DELTASONE) 20 MG tablet    azelastine (ASTELIN) 137 mcg (0.1 %) nasal spray    fluticasone propionate (FLONASE) 50 mcg/actuation nasal spray    Chronic idiopathic urticaria    Chronic rhinitis - Primary    Relevant Medications    predniSONE (DELTASONE) 20 MG tablet    azelastine (ASTELIN) 137 mcg (0.1 %) nasal spray    fluticasone propionate (FLONASE) 50 mcg/actuation nasal spray     - Chronic Idiopathic Urticaria - well controlled on high dose antihistamines   - Chronic Rhinitis/PND/cough - not controlled   - Adding Prednisone and dual intranasal sprays     Follow up:  Follow up in about 3 months (around 6/13/2025).    Kamini Van MD   [facility]  Allergy and Immunology        [1]   Current Outpatient Medications on File Prior to Visit   Medication Sig Dispense Refill    acetaminophen (TYLENOL) 500 MG tablet Take 2 tablets (1,000 mg total) by mouth every 8 (eight) hours as needed for Pain. Not actual Rx - Use for Recommended dose instructions      aspirin (ECOTRIN) 81 MG EC tablet Take 81 mg by mouth once daily.      azelastine (ASTELIN) 137 mcg (0.1 %) nasal spray 2 sprays (274 mcg total) by Nasal route 2 (two) times daily. 30 mL 0    estradioL (ESTRACE) 0.01 % (0.1 mg/gram) vaginal cream Place 1 g vaginally twice a week. 8 g 11    ezetimibe (ZETIA) 10 mg tablet Take 1 tablet (10 mg total) by mouth once daily. 90 tablet 3    fluticasone propionate (FLONASE) 50 mcg/actuation nasal spray 2 sprays (100 mcg total) by Each Nostril route once daily. 16 g 0    hydroCHLOROthiazide (HYDRODIURIL) 12.5 MG Tab Take 1 tablet by mouth once daily 90 tablet 3    HYDROcodone-acetaminophen (NORCO) 5-325 mg per tablet Take 1 tablet by mouth every 6 (six) hours as needed for Pain. 30 tablet 0    insulin aspart protamine-insulin aspart (NOVOLOG MIX 70-30FLEXPEN U-100) 100 unit/mL (70-30) InPn pen INJECT 45 UNITS INTO THE SKIN TWICE A DAY 90 mL 0    lancets Misc  "To check BG 2 times daily, to use with insurance preferred meter 180 each 4    loratadine (ALLERGY RELIEF, LORATADINE,) 10 mg tablet Take 1 tablet (10 mg total) by mouth once daily. for seven days 90 tablet 3    losartan (COZAAR) 100 MG tablet TAKE 1 TABLET BY MOUTH ONCE DAILY 90 tablet 1    pen needle, diabetic (BD BRANT 2ND GEN PEN NEEDLE) 32 gauge x 5/32" Ndle BD Brant 2nd Gen Pen Needle 32 gauge x 5/32"  USE TO INJECT INSULIN TWICE DAILY 180 each 1    promethazine-dextromethorphan (PROMETHAZINE-DM) 6.25-15 mg/5 mL Syrp Take 5 mLs by mouth 3 (three) times daily as needed. 250 mL 0    traZODone (DESYREL) 100 MG tablet Take 1 tablet (100 mg total) by mouth every evening. 90 tablet 4    tretinoin (RETIN-A) 0.025 % cream Apply a pea-sized amount to the entire face at bedtime.  Use every third night and increase as tolerated to nightly. 20 g 4    TRUE METRIX GLUCOSE TEST STRIP Strp USE 1 STRIP TO CHECK GLUCOSE THREE TIMES DAILY 300 each 3    atorvastatin (LIPITOR) 80 MG tablet Take 1 tablet (80 mg total) by mouth once daily. 90 tablet 3    omeprazole (PRILOSEC) 40 MG capsule Take 1 capsule (40 mg total) by mouth once daily. 90 capsule 3     No current facility-administered medications on file prior to visit.     "

## 2025-03-20 ENCOUNTER — OFFICE VISIT (OUTPATIENT)
Dept: FAMILY MEDICINE | Facility: CLINIC | Age: 70
End: 2025-03-20
Attending: FAMILY MEDICINE
Payer: MEDICARE

## 2025-03-20 VITALS
TEMPERATURE: 96 F | HEART RATE: 92 BPM | OXYGEN SATURATION: 96 % | WEIGHT: 154.19 LBS | HEIGHT: 60 IN | DIASTOLIC BLOOD PRESSURE: 80 MMHG | BODY MASS INDEX: 30.27 KG/M2 | SYSTOLIC BLOOD PRESSURE: 130 MMHG

## 2025-03-20 DIAGNOSIS — R05.2 SUBACUTE COUGH: Primary | ICD-10-CM

## 2025-03-20 PROCEDURE — 99214 OFFICE O/P EST MOD 30 MIN: CPT | Mod: PBBFAC,PO | Performed by: FAMILY MEDICINE

## 2025-03-20 PROCEDURE — 99214 OFFICE O/P EST MOD 30 MIN: CPT | Mod: S$PBB,,, | Performed by: FAMILY MEDICINE

## 2025-03-20 PROCEDURE — G2211 COMPLEX E/M VISIT ADD ON: HCPCS | Mod: S$PBB,,, | Performed by: FAMILY MEDICINE

## 2025-03-20 PROCEDURE — 99999 PR PBB SHADOW E&M-EST. PATIENT-LVL IV: CPT | Mod: PBBFAC,,, | Performed by: FAMILY MEDICINE

## 2025-03-20 RX ORDER — ALBUTEROL SULFATE 0.83 MG/ML
2.5 SOLUTION RESPIRATORY (INHALATION)
Status: SHIPPED | OUTPATIENT
Start: 2025-03-20

## 2025-03-20 RX ORDER — ALBUTEROL SULFATE 90 UG/1
2 INHALANT RESPIRATORY (INHALATION) EVERY 6 HOURS PRN
Qty: 18 G | Refills: 0 | Status: SHIPPED | OUTPATIENT
Start: 2025-03-20

## 2025-03-20 RX ORDER — FLUTICASONE PROPIONATE AND SALMETEROL 250; 50 UG/1; UG/1
1 POWDER RESPIRATORY (INHALATION) 2 TIMES DAILY
Qty: 1 EACH | Refills: 0 | Status: SHIPPED | OUTPATIENT
Start: 2025-03-20 | End: 2026-03-20

## 2025-03-20 NOTE — PROGRESS NOTES
Subjective:       Patient ID: Rachelle Philip is a 69 y.o. female.    Chief Complaint: Cough    69 y old female with subacute cough and sob  for 6 weeks. She has seen Allergy. On omeprazole , nasal azelastine and Fluticasone. Wheezing at night . No hx of asthma . Non smoker . She has used albuterol which has helped .      Review of Systems   Constitutional: Negative.    HENT: Negative.     Eyes: Negative.    Respiratory:  Positive for cough, shortness of breath and wheezing.    Cardiovascular: Negative.    Gastrointestinal: Negative.    Genitourinary: Negative.    Musculoskeletal: Negative.    Skin: Negative.    Hematological: Negative.        Objective:      Physical Exam  Vitals and nursing note reviewed.   Constitutional:       General: She is not in acute distress.     Appearance: She is well-developed. She is not diaphoretic.   HENT:      Head: Normocephalic and atraumatic.      Right Ear: External ear normal.      Left Ear: External ear normal.      Nose: Nose normal.   Eyes:      General: No scleral icterus.        Left eye: No discharge.      Conjunctiva/sclera: Conjunctivae normal.      Pupils: Pupils are equal, round, and reactive to light.   Neck:      Thyroid: No thyromegaly.      Vascular: No JVD.      Trachea: No tracheal deviation.   Cardiovascular:      Rate and Rhythm: Normal rate and regular rhythm.      Heart sounds: Normal heart sounds. No murmur heard.     No friction rub. No gallop.   Pulmonary:      Effort: Pulmonary effort is normal. No respiratory distress.      Breath sounds: Normal breath sounds. No wheezing or rales.   Chest:      Chest wall: No tenderness.   Abdominal:      General: Bowel sounds are normal. There is no distension.      Palpations: Abdomen is soft. There is no mass.      Tenderness: There is no abdominal tenderness. There is no guarding.   Musculoskeletal:      Cervical back: Normal range of motion and neck supple.   Lymphadenopathy:      Cervical: No cervical adenopathy.          Assessment:        Rachelle was seen today for cough.    Diagnoses and all orders for this visit:    Subacute cough  -     Complete PFT with bronchodilator; Future    Other orders  -     albuterol nebulizer solution 2.5 mg  -     albuterol (PROVENTIL/VENTOLIN HFA) 90 mcg/actuation inhaler; Inhale 2 puffs into the lungs every 6 (six) hours as needed for Wheezing.  -     fluticasone-salmeterol diskus inhaler 250-50 mcg; Inhale 1 puff into the lungs 2 (two) times daily. Controller       Plan:   Asthma variant?   Start Advair   PFT

## 2025-03-24 DIAGNOSIS — Z00.00 ENCOUNTER FOR MEDICARE ANNUAL WELLNESS EXAM: ICD-10-CM

## 2025-04-14 RX ORDER — FLUTICASONE PROPIONATE AND SALMETEROL 250; 50 UG/1; UG/1
POWDER RESPIRATORY (INHALATION)
Qty: 60 EACH | Refills: 0 | Status: SHIPPED | OUTPATIENT
Start: 2025-04-14

## 2025-04-14 NOTE — TELEPHONE ENCOUNTER
No care due was identified.  [facility] Embedded Care Due Messages. Reference number: [id number].   4/14/2025 7:51:22 AM CDT

## 2025-04-17 ENCOUNTER — RESULTS FOLLOW-UP (OUTPATIENT)
Dept: FAMILY MEDICINE | Facility: CLINIC | Age: 70
End: 2025-04-17

## 2025-04-17 ENCOUNTER — LAB VISIT (OUTPATIENT)
Dept: LAB | Facility: HOSPITAL | Age: 70
End: 2025-04-17
Attending: FAMILY MEDICINE
Payer: MEDICARE

## 2025-04-17 ENCOUNTER — OFFICE VISIT (OUTPATIENT)
Dept: NEUROSURGERY | Facility: CLINIC | Age: 70
End: 2025-04-17
Payer: MEDICARE

## 2025-04-17 ENCOUNTER — OFFICE VISIT (OUTPATIENT)
Dept: FAMILY MEDICINE | Facility: CLINIC | Age: 70
End: 2025-04-17
Attending: FAMILY MEDICINE
Payer: MEDICARE

## 2025-04-17 VITALS
WEIGHT: 148.81 LBS | HEART RATE: 79 BPM | DIASTOLIC BLOOD PRESSURE: 72 MMHG | SYSTOLIC BLOOD PRESSURE: 116 MMHG | BODY MASS INDEX: 28.1 KG/M2 | HEIGHT: 61 IN

## 2025-04-17 VITALS
BODY MASS INDEX: 28.1 KG/M2 | TEMPERATURE: 99 F | DIASTOLIC BLOOD PRESSURE: 76 MMHG | OXYGEN SATURATION: 98 % | HEART RATE: 77 BPM | HEIGHT: 61 IN | WEIGHT: 148.81 LBS | SYSTOLIC BLOOD PRESSURE: 138 MMHG

## 2025-04-17 DIAGNOSIS — M54.16 LUMBAR RADICULOPATHY: Primary | ICD-10-CM

## 2025-04-17 DIAGNOSIS — J02.9 SORE THROAT: ICD-10-CM

## 2025-04-17 DIAGNOSIS — M54.10 BACK PAIN WITH RADICULOPATHY: ICD-10-CM

## 2025-04-17 DIAGNOSIS — E78.5 DM TYPE 2 WITH DIABETIC DYSLIPIDEMIA: Primary | ICD-10-CM

## 2025-04-17 DIAGNOSIS — Z12.11 COLON CANCER SCREENING: ICD-10-CM

## 2025-04-17 DIAGNOSIS — E11.69 DM TYPE 2 WITH DIABETIC DYSLIPIDEMIA: Primary | ICD-10-CM

## 2025-04-17 DIAGNOSIS — M79.10 MYALGIA: ICD-10-CM

## 2025-04-17 DIAGNOSIS — M43.16 SPONDYLOLISTHESIS, LUMBAR REGION: ICD-10-CM

## 2025-04-17 DIAGNOSIS — E11.69 DM TYPE 2 WITH DIABETIC DYSLIPIDEMIA: ICD-10-CM

## 2025-04-17 DIAGNOSIS — I10 HYPERTENSION, UNSPECIFIED TYPE: ICD-10-CM

## 2025-04-17 DIAGNOSIS — E78.5 DM TYPE 2 WITH DIABETIC DYSLIPIDEMIA: ICD-10-CM

## 2025-04-17 DIAGNOSIS — M54.16 LUMBAR RADICULOPATHY: ICD-10-CM

## 2025-04-17 DIAGNOSIS — M48.062 LUMBAR STENOSIS WITH NEUROGENIC CLAUDICATION: Primary | ICD-10-CM

## 2025-04-17 DIAGNOSIS — M48.062 LUMBAR STENOSIS WITH NEUROGENIC CLAUDICATION: ICD-10-CM

## 2025-04-17 LAB
ABSOLUTE EOSINOPHIL (OHS): 0.16 K/UL
ABSOLUTE MONOCYTE (OHS): 0.53 K/UL (ref 0.3–1)
ABSOLUTE NEUTROPHIL COUNT (OHS): 4.19 K/UL (ref 1.8–7.7)
ALBUMIN SERPL BCP-MCNC: 4 G/DL (ref 3.5–5.2)
ALP SERPL-CCNC: 123 UNIT/L (ref 40–150)
ALT SERPL W/O P-5'-P-CCNC: 33 UNIT/L (ref 10–44)
ANION GAP (OHS): 9 MMOL/L (ref 8–16)
AST SERPL-CCNC: 22 UNIT/L (ref 11–45)
BASOPHILS # BLD AUTO: 0.07 K/UL
BASOPHILS NFR BLD AUTO: 0.9 %
BILIRUB SERPL-MCNC: 0.5 MG/DL (ref 0.1–1)
BUN SERPL-MCNC: 20 MG/DL (ref 8–23)
CALCIUM SERPL-MCNC: 9.8 MG/DL (ref 8.7–10.5)
CHLORIDE SERPL-SCNC: 104 MMOL/L (ref 95–110)
CHOLEST SERPL-MCNC: 312 MG/DL (ref 120–199)
CHOLEST/HDLC SERPL: 6.6 {RATIO} (ref 2–5)
CK SERPL-CCNC: 94 U/L (ref 20–180)
CO2 SERPL-SCNC: 27 MMOL/L (ref 23–29)
CREAT SERPL-MCNC: 0.8 MG/DL (ref 0.5–1.4)
CTP QC/QA: YES
EAG (OHS): 189 MG/DL (ref 68–131)
ERYTHROCYTE [DISTWIDTH] IN BLOOD BY AUTOMATED COUNT: 12 % (ref 11.5–14.5)
FLUAV AG NPH QL: NEGATIVE
FLUBV AG NPH QL: NEGATIVE
GFR SERPLBLD CREATININE-BSD FMLA CKD-EPI: >60 ML/MIN/1.73/M2
GLUCOSE SERPL-MCNC: 210 MG/DL (ref 70–110)
HBA1C MFR BLD: 8.2 % (ref 4–5.6)
HCT VFR BLD AUTO: 41.8 % (ref 37–48.5)
HDLC SERPL-MCNC: 47 MG/DL (ref 40–75)
HDLC SERPL: 15.1 % (ref 20–50)
HGB BLD-MCNC: 13.6 GM/DL (ref 12–16)
IMM GRANULOCYTES # BLD AUTO: 0.01 K/UL (ref 0–0.04)
IMM GRANULOCYTES NFR BLD AUTO: 0.1 % (ref 0–0.5)
LDLC SERPL CALC-MCNC: 207.4 MG/DL (ref 63–159)
LYMPHOCYTES # BLD AUTO: 2.62 K/UL (ref 1–4.8)
MCH RBC QN AUTO: 29.8 PG (ref 27–31)
MCHC RBC AUTO-ENTMCNC: 32.5 G/DL (ref 32–36)
MCV RBC AUTO: 92 FL (ref 82–98)
MOLECULAR STREP A: NEGATIVE
NONHDLC SERPL-MCNC: 265 MG/DL
NUCLEATED RBC (/100WBC) (OHS): 0 /100 WBC
PLATELET # BLD AUTO: 387 K/UL (ref 150–450)
PMV BLD AUTO: 10.5 FL (ref 9.2–12.9)
POTASSIUM SERPL-SCNC: 5.1 MMOL/L (ref 3.5–5.1)
PROT SERPL-MCNC: 7.4 GM/DL (ref 6–8.4)
RBC # BLD AUTO: 4.56 M/UL (ref 4–5.4)
RELATIVE EOSINOPHIL (OHS): 2.1 %
RELATIVE LYMPHOCYTE (OHS): 34.6 % (ref 18–48)
RELATIVE MONOCYTE (OHS): 7 % (ref 4–15)
RELATIVE NEUTROPHIL (OHS): 55.3 % (ref 38–73)
SARS-COV-2 RDRP RESP QL NAA+PROBE: NEGATIVE
SODIUM SERPL-SCNC: 140 MMOL/L (ref 136–145)
TRIGL SERPL-MCNC: 288 MG/DL (ref 30–150)
WBC # BLD AUTO: 7.58 K/UL (ref 3.9–12.7)

## 2025-04-17 PROCEDURE — 80053 COMPREHEN METABOLIC PANEL: CPT

## 2025-04-17 PROCEDURE — 99213 OFFICE O/P EST LOW 20 MIN: CPT | Mod: S$PBB,,, | Performed by: NEUROLOGICAL SURGERY

## 2025-04-17 PROCEDURE — 99999 PR PBB SHADOW E&M-EST. PATIENT-LVL IV: CPT | Mod: PBBFAC,,, | Performed by: FAMILY MEDICINE

## 2025-04-17 PROCEDURE — 99214 OFFICE O/P EST MOD 30 MIN: CPT | Mod: PBBFAC,PO | Performed by: FAMILY MEDICINE

## 2025-04-17 PROCEDURE — 80061 LIPID PANEL: CPT

## 2025-04-17 PROCEDURE — 36415 COLL VENOUS BLD VENIPUNCTURE: CPT | Mod: PO

## 2025-04-17 PROCEDURE — 83036 HEMOGLOBIN GLYCOSYLATED A1C: CPT

## 2025-04-17 PROCEDURE — 87635 SARS-COV-2 COVID-19 AMP PRB: CPT | Mod: PBBFAC,PO | Performed by: FAMILY MEDICINE

## 2025-04-17 PROCEDURE — 99999 PR PBB SHADOW E&M-EST. PATIENT-LVL IV: CPT | Mod: PBBFAC,,, | Performed by: NEUROLOGICAL SURGERY

## 2025-04-17 PROCEDURE — 99999PBSHW POCT STREP A MOLECULAR: Mod: PBBFAC,,,

## 2025-04-17 PROCEDURE — 85025 COMPLETE CBC W/AUTO DIFF WBC: CPT

## 2025-04-17 PROCEDURE — 87651 STREP A DNA AMP PROBE: CPT | Mod: PBBFAC,PO | Performed by: FAMILY MEDICINE

## 2025-04-17 PROCEDURE — 99999PBSHW: Mod: PBBFAC,,,

## 2025-04-17 PROCEDURE — 99214 OFFICE O/P EST MOD 30 MIN: CPT | Mod: S$PBB,,, | Performed by: FAMILY MEDICINE

## 2025-04-17 PROCEDURE — 99999PBSHW POCT INFLUENZA A/B: Mod: PBBFAC,,,

## 2025-04-17 PROCEDURE — 87804 INFLUENZA ASSAY W/OPTIC: CPT | Mod: 59,PBBFAC,PO | Performed by: FAMILY MEDICINE

## 2025-04-17 PROCEDURE — 82550 ASSAY OF CK (CPK): CPT

## 2025-04-17 PROCEDURE — G2211 COMPLEX E/M VISIT ADD ON: HCPCS | Mod: S$PBB,,, | Performed by: FAMILY MEDICINE

## 2025-04-17 PROCEDURE — 99214 OFFICE O/P EST MOD 30 MIN: CPT | Mod: PBBFAC,27 | Performed by: NEUROLOGICAL SURGERY

## 2025-04-17 RX ORDER — HYDROCODONE BITARTRATE AND ACETAMINOPHEN 5; 325 MG/1; MG/1
1 TABLET ORAL EVERY 6 HOURS PRN
Qty: 28 TABLET | Refills: 0 | Status: SHIPPED | OUTPATIENT
Start: 2025-04-17

## 2025-04-17 RX ORDER — INSULIN GLARGINE 100 [IU]/ML
INJECTION, SOLUTION SUBCUTANEOUS
Qty: 8 EACH | Refills: 0 | Status: SHIPPED | OUTPATIENT
Start: 2025-04-17

## 2025-04-17 NOTE — PROGRESS NOTES
Subjective:       Patient ID: Rachelle Philip is a 69 y.o. female.    Chief Complaint: Diabetes    69 y old female with type 2  dm , htn , lumbar stenosis here for follow up . She stopped Novolog 1 w ago since she started exeperincing abdominal pain and generalized myalgias . FBS have been in the 300's range , 2 hr pp: 180 . She also cancelled her spinal surgery . She will like a rf on hydrocodone. Lastly , dealing with ST for 1 d. She is employed at a .      Review of Systems   Constitutional: Negative.    HENT: Negative.     Eyes: Negative.    Respiratory: Negative.     Cardiovascular: Negative.    Gastrointestinal: Negative.    Genitourinary: Negative.    Musculoskeletal: Negative.    Skin: Negative.    Hematological: Negative.        Objective:      Physical Exam  Constitutional:       General: She is not in acute distress.     Appearance: She is well-developed. She is not diaphoretic.   HENT:      Head: Normocephalic and atraumatic.      Right Ear: External ear normal.      Left Ear: External ear normal.      Mouth/Throat:      Pharynx: No oropharyngeal exudate.   Eyes:      General: No scleral icterus.        Right eye: No discharge.         Left eye: No discharge.      Conjunctiva/sclera: Conjunctivae normal.      Pupils: Pupils are equal, round, and reactive to light.   Neck:      Thyroid: No thyromegaly.      Vascular: No JVD.      Trachea: No tracheal deviation.   Cardiovascular:      Rate and Rhythm: Normal rate and regular rhythm.      Heart sounds: Normal heart sounds. No murmur heard.     No friction rub. No gallop.   Pulmonary:      Effort: Pulmonary effort is normal. No respiratory distress.      Breath sounds: Normal breath sounds. No stridor. No wheezing or rales.   Chest:      Chest wall: No tenderness.   Abdominal:      General: Bowel sounds are normal. There is no distension.      Palpations: Abdomen is soft.      Tenderness: There is no abdominal tenderness. There is no guarding or rebound.    Musculoskeletal:         General: Normal range of motion.      Cervical back: Normal range of motion and neck supple.   Lymphadenopathy:      Cervical: No cervical adenopathy.   Skin:     General: Skin is warm and dry.   Neurological:      Mental Status: She is alert and oriented to person, place, and time.   Psychiatric:         Mood and Affect: Mood normal.         Behavior: Behavior normal.         Thought Content: Thought content normal.         Judgment: Judgment normal.         Assessment:     Rachelle was seen today for diabetes.    Diagnoses and all orders for this visit:    DM type 2 with diabetic dyslipidemia  -     CBC Auto Differential; Future  -     Comprehensive Metabolic Panel; Future  -     Hemoglobin A1C; Future  -     Lipid Panel; Future    Colon cancer screening  -     Fecal Immunochemical Test (iFOBT); Future  -     Fecal Immunochemical Test (iFOBT)    Hypertension, unspecified type    Myalgia  -     CK; Future    Spondylolisthesis, lumbar region  -     HYDROcodone-acetaminophen (NORCO) 5-325 mg per tablet; Take 1 tablet by mouth every 6 (six) hours as needed for Pain.    Lumbar radiculopathy  -     HYDROcodone-acetaminophen (NORCO) 5-325 mg per tablet; Take 1 tablet by mouth every 6 (six) hours as needed for Pain.    Sore throat  -     POCT COVID-19 Rapid Screening  -     POCT Influenza A/B  -     POCT Strep A, Molecular    Other orders  -     insulin glargine U-100, Lantus, (LANTUS SOLOSTAR U-100 INSULIN) 100 unit/mL (3 mL) InPn pen; 20 u sq qhs           Plan:     1.- Start Lantus. Increase it by 4 units every 4 days .   2.- FIT    3.- Controlled. Continue meds  4.- Statin induced?   5.-Med rf   6.- We will continue to follow up .

## 2025-04-17 NOTE — PROGRESS NOTES
Subjective:      Patient ID: Rachelle Philip is a 69 y.o. female.    Chief Complaint: Lumbar radiculopathy (Pt reports for f/u of lumbar spine pain; Pt reports symptoms are unchanged since LOV; Pt has no new imaging to review; Pt advised she has no interest in surgery but would like physical therapy and pain management ref)    Pt here for follow up Lumbar spine pain. No new imaging.   Symptoms consistent with previous visit. Would like to avoid surgery at all costs and would prefer to continue with Pain Management & PT.   Meds = Hydrocodone      Previous Note:  Patient is seen today for followup  She is with a family member who providing interpretation today  Symptoms are unchanged I went over her imaging with her  Lower back and left lower extremity symptoms the pain numbness and tingling  I gave her a short course of prescription h pain medication which she states helps         Previous Notes   Please note patient's history was obtained through   Pt here for follow up of LBP post CT Lumbar.  Standing x-rays.  Survey  States symptoms have progressed since previous visit.  Pain in Jovi LE has gotten worse.   Continuing to ambulate unassisted.  Pain is 8/10 goes through the right greater than left lower extremity with associated numbness and tingling  Patient works on her feet cleaning houses  Previous Visit:   Back and R LE pain through foot   DIEGO x 2   Spondy L4-5 and to a lesser degree L3-4 \  Worse with activity and better with rest   Ambulates unassisted   No previous surgery       Follow-up  Associated symptoms include weakness. Pertinent negatives include no abdominal pain, chest pain, chills, headaches or sore throat.   Leg Pain       Review of Systems   Constitutional:  Negative for activity change, appetite change and chills.   HENT:  Negative for hearing loss, sore throat and tinnitus.    Eyes:  Negative for pain, discharge and itching.   Cardiovascular:  Negative for chest pain.    Gastrointestinal:  Negative for abdominal pain.   Endocrine: Negative for cold intolerance and heat intolerance.   Genitourinary:  Negative for difficulty urinating and dysuria.   Musculoskeletal:  Positive for back pain and gait problem.   Allergic/Immunologic: Negative for environmental allergies.   Neurological:  Positive for weakness. Negative for dizziness, tremors, light-headedness and headaches.   Hematological:  Negative for adenopathy.   Psychiatric/Behavioral:  Negative for agitation, behavioral problems and confusion.          Objective:       Physical Exam:  Nursing note and vitals reviewed.    Constitutional: She appears well-nourished. She is not diaphoretic. No distress.     Eyes: Pupils are equal, round, and reactive to light. EOM are normal.     Cardiovascular: Normal rate and regular rhythm.     Psych/Behavior: She is alert. She is oriented to person, place, and time. She has a normal mood and affect.     Musculoskeletal:        Back: Range of motion is limited. There is tenderness. Muscle strength is 5/5.       Right Lower Extremities: Range of motion is full. There is no tenderness. Muscle strength is 5/5. Tone is normal.        Left Lower Extremities: There is no tenderness. Muscle strength is 5/5. Tone is normal.     Neurological:        Sensory: There is no sensory deficit in the trunk. There is no sensory deficit in the extremities.        Cranial nerves: Cranial nerve(s) II, III, IV, V, VI, VII, VIII, IX, X, XI and XII are intact.     General    Nursing note and vitals reviewed.  Constitutional: She is oriented to person, place, and time. She appears well-nourished. No distress.   Eyes: EOM are normal. Pupils are equal, round, and reactive to light.   Cardiovascular:  Normal rate and regular rhythm.            Neurological: She is alert and oriented to person, place, and time.   Psychiatric: She has a normal mood and affect.             Ortho Exam        Results for orders placed during  the hospital encounter of 02/21/24    MRI Lumbar Spine Without Contrast    Narrative  EXAMINATION:  MRI LUMBAR SPINE WITHOUT CONTRAST    CLINICAL HISTORY:  Dorsalgia, unspecifiedLow back pain, symptoms persist with > 6wks conservative treatment;LUMBAR RADICULOPATHY;    TECHNIQUE:  Multiplanar non contrast enhanced images obtained on the lumbar spine.    COMPARISON:  None    FINDINGS:  Levoscoliosis centered at L4.  No fracture of the lumbar spine.  No bone marrow replacing process.  No paraspinal or intrathecal mass.  The conus medullaris has a normal signal intensity morphology and terminates at the L1 level.    T12-L1: Normal.    L1-2:  Normal.    L2-3: Bilateral facet joint osteoarthritis, worse on the left side.  Degenerative subtle grade 1 anterolisthesis of L2 on L3.  Mild disc bulge.  No central canal stenosis.  Moderate left neural foraminal stenosis.    L3-4: Severe bilateral facet joint osteoarthritis with grade 1 anterolisthesis of L3 on L4.  Uncovering of the disc.  Moderate central canal stenosis.  Mild right and moderate left neural foraminal stenosis.    L4-5: Severe bilateral facet joint osteoarthritis with grade 1 anterolisthesis and uncovering of the disc.  There is severe central canal stenosis and complete obliteration of the right lateral recess with impingement upon the descending right L5 nerve root.  Severe right neural foraminal stenosis due to a the anterolisthesis and facet joint hypertrophic changes and scoliosis with impingement upon the exiting right L4 nerve root.  Mild left neural foraminal stenosis.    L5-S1:  Normal.    Impression  1. Severe bilateral facet joint osteoarthritis with grade 1 anterolisthesis of L4 on L5 and un covering of the disc resulting in severe central canal stenosis and complete obliteration of the right lateral recess with impingement upon the descending right L5 nerve root in its lateral recess.  2. Severe right L4-5 neural foraminal stenosis due to the  anterolisthesis and hypertrophic changes of the right facet joint and scoliosis with impingement upon the exiting right L4 nerve root in its neural foramen.  3. Severe bilateral facet joint osteoarthritis L3-L4 grade 1 anterolisthesis of L3 on L4 and moderate central canal stenosis.  Mild right and moderate left L3-L4 neural foraminal stenosis.  4. Moderate left L2-L3 neural foraminal stenosis.      \    Assessment:     1. Lumbar stenosis with neurogenic claudication    2. Spondylolisthesis, lumbar region    3. Back pain with radiculopathy            Plan:     Lumbar stenosis with neurogenic claudication    Spondylolisthesis, lumbar region    Back pain with radiculopathy            I again went over the treatment options with her along with the procedure her and family member and after extensive discussion patient has decided to hold on any surgical procedure  She understands if she develops any new or worsening symptoms including pain numbness tingling or weakness in the bilateral lower extremities I recommended she come back sooner she does have severe stenosis with facet disease and anterolisthesis moderate stenosis at L4-5 and L3-4 with impingement of the nerve roots going down inferiorly  In the meantime she can try physical therapy as well as pain management for injections to see if this will alleviate her symptoms    Thank you for the referral   Please call with any questions    Americo Sandy MD  Neurosurgery     Disclaimer: This note was prepared using a voice recognition system and is likely to have sound alike errors within the text.

## 2025-05-01 ENCOUNTER — CLINICAL SUPPORT (OUTPATIENT)
Dept: REHABILITATION | Facility: HOSPITAL | Age: 70
End: 2025-05-01
Attending: NEUROLOGICAL SURGERY
Payer: MEDICARE

## 2025-05-01 DIAGNOSIS — M48.062 LUMBAR STENOSIS WITH NEUROGENIC CLAUDICATION: ICD-10-CM

## 2025-05-01 DIAGNOSIS — R68.89 DECREASED FUNCTIONAL ACTIVITY TOLERANCE: ICD-10-CM

## 2025-05-01 DIAGNOSIS — M25.60 DECREASED RANGE OF MOTION WITH DECREASED STRENGTH: Primary | ICD-10-CM

## 2025-05-01 DIAGNOSIS — R53.1 DECREASED RANGE OF MOTION WITH DECREASED STRENGTH: Primary | ICD-10-CM

## 2025-05-01 DIAGNOSIS — M54.16 LUMBAR RADICULOPATHY: ICD-10-CM

## 2025-05-01 PROCEDURE — 97161 PT EVAL LOW COMPLEX 20 MIN: CPT | Mod: PN

## 2025-05-01 PROCEDURE — 97112 NEUROMUSCULAR REEDUCATION: CPT | Mod: PN

## 2025-05-02 ENCOUNTER — OFFICE VISIT (OUTPATIENT)
Dept: FAMILY MEDICINE | Facility: CLINIC | Age: 70
End: 2025-05-02
Attending: FAMILY MEDICINE
Payer: MEDICARE

## 2025-05-02 VITALS
HEIGHT: 61 IN | BODY MASS INDEX: 27.45 KG/M2 | WEIGHT: 145.38 LBS | HEART RATE: 79 BPM | DIASTOLIC BLOOD PRESSURE: 74 MMHG | OXYGEN SATURATION: 98 % | SYSTOLIC BLOOD PRESSURE: 114 MMHG | TEMPERATURE: 99 F

## 2025-05-02 DIAGNOSIS — Z79.4 TYPE 2 DIABETES MELLITUS WITH OTHER SPECIFIED COMPLICATION, WITH LONG-TERM CURRENT USE OF INSULIN: Primary | ICD-10-CM

## 2025-05-02 DIAGNOSIS — E11.69 TYPE 2 DIABETES MELLITUS WITH OTHER SPECIFIED COMPLICATION, WITH LONG-TERM CURRENT USE OF INSULIN: Primary | ICD-10-CM

## 2025-05-02 PROCEDURE — 99999 PR PBB SHADOW E&M-EST. PATIENT-LVL V: CPT | Mod: PBBFAC,,, | Performed by: FAMILY MEDICINE

## 2025-05-02 PROCEDURE — 99215 OFFICE O/P EST HI 40 MIN: CPT | Mod: PBBFAC,PO | Performed by: FAMILY MEDICINE

## 2025-05-02 RX ORDER — TIRZEPATIDE 2.5 MG/.5ML
2.5 INJECTION, SOLUTION SUBCUTANEOUS
Qty: 4 PEN | Refills: 0 | Status: SHIPPED | OUTPATIENT
Start: 2025-05-02

## 2025-05-02 NOTE — PROGRESS NOTES
Subjective:       Patient ID: Rachelle Philip is a 69 y.o. female.    Chief Complaint: Follow-up and Diabetes    69 y old female with uncontrolled t2 dm and dlp here for f.u . Upset about her persistent hyperglycemia . She has elemininated carbs from her diet . FBS: 300's . Exercises  regularly . She has been taking Lipitor 40 mg over the last 3 months .       Review of Systems   Constitutional: Negative.    HENT: Negative.     Eyes: Negative.    Respiratory: Negative.     Cardiovascular: Negative.    Gastrointestinal: Negative.    Genitourinary: Negative.    Musculoskeletal: Negative.    Skin: Negative.    Hematological: Negative.        Objective:      Physical Exam  Vitals and nursing note reviewed.   Constitutional:       General: She is not in acute distress.     Appearance: She is well-developed. She is not diaphoretic.   HENT:      Head: Normocephalic and atraumatic.      Right Ear: External ear normal.      Left Ear: External ear normal.      Nose: Nose normal.   Eyes:      General: No scleral icterus.        Left eye: No discharge.      Conjunctiva/sclera: Conjunctivae normal.      Pupils: Pupils are equal, round, and reactive to light.   Neck:      Thyroid: No thyromegaly.      Vascular: No JVD.      Trachea: No tracheal deviation.   Cardiovascular:      Rate and Rhythm: Normal rate and regular rhythm.      Heart sounds: Normal heart sounds. No murmur heard.     No friction rub. No gallop.   Pulmonary:      Effort: Pulmonary effort is normal. No respiratory distress.      Breath sounds: Normal breath sounds. No wheezing or rales.   Chest:      Chest wall: No tenderness.   Abdominal:      General: Bowel sounds are normal. There is no distension.      Palpations: Abdomen is soft. There is no mass.      Tenderness: There is no abdominal tenderness. There is no guarding.   Musculoskeletal:      Cervical back: Normal range of motion and neck supple.   Lymphadenopathy:      Cervical: No cervical adenopathy.          Assessment:       1. Type 2 diabetes mellitus with other specified complication, with long-term current use of insulin        Plan:     Rachelle was seen today for follow-up and diabetes.    Diagnoses and all orders for this visit:    Type 2 diabetes mellitus with other specified complication, with long-term current use of insulin  -     tirzepatide (MOUNJARO) 2.5 mg/0.5 mL PnIj; Inject 2.5 mg into the skin every 7 days.     1.- Uncontrolled. Start Mounjaro . Side effects discussed. She will also Increase Lantus every 4 days by 4 units until FBS: < 130 and 2 hrs pp< 180 . F.u in 6 week   2.- She will also start taking Lipitor 80 mg qhs

## 2025-05-03 NOTE — PROGRESS NOTES
Outpatient Rehab    Physical Therapy Evaluation    Patient Name: Rachelle Philip  MRN: [id number]  YOB: 1955  Encounter Date: 5/1/2025    Therapy Diagnosis:   Encounter Diagnoses   Name Primary?    Lumbar radiculopathy     Lumbar stenosis with neurogenic claudication     Decreased range of motion with decreased strength Yes    Decreased functional activity tolerance      Physician: Americo Sandy MD    Physician Orders: Eval and Treat  Medical Diagnosis: Lumbar radiculopathy  Lumbar stenosis with neurogenic claudication    Visit # / Visits Authorized:  1 / 1  Insurance Authorization Period: 4/17/2025 to 4/17/2026  Date of Evaluation: 5/1/2025  Plan of Care Certification: 5/1/2025 to 6/28/2025     Time In: 1030   Time Out: 1120  Total Time (in minutes): 50   Total Billable Time (in minutes): 50    Intake Outcome Measure for FOTO Survey    Therapist reviewed FOTO scores for Rachelle Philip on 5/1/2025.   FOTO report - see Media section or FOTO account episode details.     Intake Score: 47%         Subjective   History of Present Illness  Rachelle is a 69 y.o. female who reports to physical therapy with a chief concern of Low back pain.                 History of Present Condition/Illness: Low back pain for about 4 years.  Pain in leg(right) down to anterior ankle.    Pain     Patient reports a current pain level of 9/10. Pain at best is reported as 4/10. Pain at worst is reported as 10/10.   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    Rachelle Philip  has a past surgical history that includes Tubal ligation; Selective injection of anesthetic agent around lumbar spinal nerve root by transforaminal  approach (Right, 4/3/2024); and Epidural steroid injection (N/A, 9/13/2024).    Rachelle has a current medication list which includes the following prescription(s): albuterol, aspirin, atorvastatin, azelastine, estradiol, ezetimibe, fluticasone propionate, fluticasone-salmeterol 250-50 mcg/dose, hydrochlorothiazide, hydrocodone-acetaminophen, lantus solostar u-100 insulin, lancets, loratadine, losartan, omeprazole, pen needle, diabetic, mounjaro, tretinoin, and true metrix glucose test strip, and the following Facility-Administered Medications: albuterol.    Review of patient's allergies indicates:   Allergen Reactions    Gabapentin Itching    Metformin      GI side effects         Objective      Lumbar Range of Motion   Active (deg) Passive (deg) Pain   Flexion 60 (%)       Extension 20 (%)       Right Lateral Flexion         Right Rotation         Left Lateral Flexion         Left Rotation                  Hip Range of Motion   Right Hip   Active (deg) Passive (deg) Pain   Flexion   100     Extension 10       ABduction         ADduction         External Rotation 90/90         External Rotation Prone         Internal Rotation 90/90         Internal Rotation Prone             Left Hip   Active (deg) Passive (deg) Pain   Flexion   100     Extension 10       ABduction         ADduction         External Rotation 90/90         External Rotation Prone         Internal Rotation 90/90         Internal Rotation Prone                            Hip Strength - Planes of Motion   Right Strength Right Pain Left Strength Left  Pain   Flexion (L2) 4   4     Extension 4-   4-     ABduction 4-   4-     ADduction           Internal Rotation           External Rotation                      Treatment:  Balance/Neuromuscular Re-Education  NMR 1: Posterior pelvic tilt in supine  NMR 2: Supine march with bracing  NMR 3: Lower trunk rotation in supine  NMR 4: Spine hip abduction with band    Time Entry(in minutes):  PT Evaluation (Low) Time  Entry: 25  Neuromuscular Re-Education Time Entry: 25    Assessment & Plan   Assessment  Rachelle presents with a condition of Low complexity.   Presentation of Symptoms: Stable       Functional Limitations: Activity tolerance, Completing work/school activities, Pain when reaching, Pain with ADLs/IADLs, Sitting tolerance, Disrupted sleep pattern, Range of motion, Performing household chores, Participating in leisure activities, Painful locomotion/ambulation  Impairments: Activity intolerance, Abnormal or restricted range of motion, Impaired physical strength, Lack of appropriate home exercise program, Pain with functional activity    Prognosis: Excellent  Assessment Details: Patient demonstrates deficits with range of motion, strength, and function that limit ability to participate in school, work, and recreational activities. They would benefit from skilled PT services to normalize kinetic chain mobility, strength, and function to safely return to their prior level of activity.    Plan  From a physical therapy perspective, the patient would benefit from: Skilled Rehab Services    Planned therapy interventions include: Therapeutic exercise, Therapeutic activities, Neuromuscular re-education, Manual therapy, ADLs/IADLs, Other (Comment), and Gait training. Dry Needling (prn)  Planned modalities to include: Biofeedback, Electrical stimulation - attended, Electrical stimulation - passive/unattended, Thermotherapy (hot pack), and Cryotherapy (cold pack).        Visit Frequency: 2 times Per Week for 8 Weeks.       This plan was discussed with Patient.   Discussion participants: Agreed Upon Plan of Care  Plan details: Frequency and duration of treatment to be adjusted as needed          Patient's spiritual, cultural, and educational needs considered and patient agreeable to plan of care and goals.           Goals:   Active       Functional outcome       Pt will improve FOTO disability score to 58% disability or less in order to  improve overall QOL and return to PLOF.         Start:  05/01/25    Expected End:  06/28/25               Home program       The patient will be independent in home program       Start:  05/01/25    Expected End:  05/31/25               Pain       Patient will report pain of 5/10 demonstrating a reduction of overall pain       Start:  05/01/25    Expected End:  05/31/25               Pain       Patient will report pain of 2/10 demonstrating a reduction of overall pain       Start:  05/01/25    Expected End:  06/28/25               Range of Motion       Patient will achieve spinal flexion to 80%       Start:  05/01/25    Expected End:  06/28/25               Strength       Patient will achieve bilateral hip extension strength of 4/5       Start:  05/01/25    Expected End:  06/28/25                Lopez Shell, PT

## 2025-05-08 ENCOUNTER — OFFICE VISIT (OUTPATIENT)
Dept: DERMATOLOGY | Facility: CLINIC | Age: 70
End: 2025-05-08
Payer: MEDICARE

## 2025-05-08 DIAGNOSIS — L70.0 ACNE VULGARIS: ICD-10-CM

## 2025-05-08 DIAGNOSIS — L71.9 ROSACEA: Primary | ICD-10-CM

## 2025-05-08 PROCEDURE — 99214 OFFICE O/P EST MOD 30 MIN: CPT | Mod: S$PBB,,, | Performed by: PHYSICIAN ASSISTANT

## 2025-05-08 PROCEDURE — G2211 COMPLEX E/M VISIT ADD ON: HCPCS | Mod: S$PBB,,, | Performed by: PHYSICIAN ASSISTANT

## 2025-05-08 PROCEDURE — 99214 OFFICE O/P EST MOD 30 MIN: CPT | Mod: PBBFAC | Performed by: PHYSICIAN ASSISTANT

## 2025-05-08 PROCEDURE — 99999 PR PBB SHADOW E&M-EST. PATIENT-LVL IV: CPT | Mod: PBBFAC,,, | Performed by: PHYSICIAN ASSISTANT

## 2025-05-08 RX ORDER — METRONIDAZOLE 10 MG/G
GEL TOPICAL DAILY
Qty: 60 G | Refills: 2 | Status: SHIPPED | OUTPATIENT
Start: 2025-05-08 | End: 2026-05-08

## 2025-05-08 NOTE — PROGRESS NOTES
Subjective:      Patient ID:  Rachelle Philip is a 69 y.o. female who presents for   Chief Complaint   Patient presents with    Rash     Hx of acne, DPNs, milia, last seen 11/14/24. Here for f/u. Primary language Amharic, using Leatha for translation. Still getting red, pimple, swollen, painful bumps.  Using tretinoin but as a spot treatment when she gets them.  Frequent flares weekly. +Reds, painful, spots that seem to pop up after going to suana at the gym and symptoms persist for 3 days after.     Previous tx: tretinoin 0.025% cream and advised d/c of facial cleansing wipes in favor of gentle cleanser.         Review of Systems   Constitutional:  Negative for fever and chills.   Gastrointestinal:  Negative for nausea and vomiting.   Skin:  Positive for activity-related sunscreen use. Negative for itching, rash, dry skin, sun sensitivity, daily sunscreen use, recent sunburn and dry lips.   Hematologic/Lymphatic: Does not bruise/bleed easily.       Objective:   Physical Exam   Constitutional: She appears well-developed and well-nourished. No distress.   Neurological: She is alert and oriented to person, place, and time. She is not disoriented.   Psychiatric: She has a normal mood and affect.   Skin:   Areas Examined (abnormalities noted in diagram):   Head / Face Inspection Performed  Neck Inspection Performed  Chest / Axilla Inspection Performed  RUE Inspected  LUE Inspection Performed            Diagram Legend     Erythematous scaling macule/papule c/w actinic keratosis       Vascular papule c/w angioma      Pigmented verrucoid papule/plaque c/w seborrheic keratosis      Yellow umbilicated papule c/w sebaceous hyperplasia      Irregularly shaped tan macule c/w lentigo     1-2 mm smooth white papules consistent with Milia      Movable subcutaneous cyst with punctum c/w epidermal inclusion cyst      Subcutaneous movable cyst c/w pilar cyst      Firm pink to brown papule c/w dermatofibroma      Pedunculated fleshy  papule(s) c/w skin tag(s)      Evenly pigmented macule c/w junctional nevus     Mildly variegated pigmented, slightly irregular-bordered macule c/w mildly atypical nevus      Flesh colored to evenly pigmented papule c/w intradermal nevus       Pink pearly papule/plaque c/w basal cell carcinoma      Erythematous hyperkeratotic cursted plaque c/w SCC      Surgical scar with no sign of skin cancer recurrence      Open and closed comedones      Inflammatory papules and pustules      Verrucoid papule consistent consistent with wart     Erythematous eczematous patches and plaques     Dystrophic onycholytic nail with subungual debris c/w onychomycosis     Umbilicated papule    Erythematous-base heme-crusted tan verrucoid plaque consistent with inflamed seborrheic keratosis     Erythematous Silvery Scaling Plaque c/w Psoriasis     See annotation      Assessment / Plan:        Rosacea  Acne vulgaris  -     metronidazole 1% (METROGEL) 1 % Gel; Apply topically once daily. For rosacea.  Dispense: 60 g; Refill: 2  Strongly suspect rosacea given h/o intermittent flaring of redness / pain which seems to come after heat of sauna. Trial of above rx. Advised may d/c tretinoin, but she wishes to continue as she finds it helps. Advised caution w/tretinoin as retinoids may flare rosacea. Encouraged liberal daily mineral spf 30+.            Follow up in about 3 months (around 8/8/2025) for rosacea.

## 2025-05-09 ENCOUNTER — CLINICAL SUPPORT (OUTPATIENT)
Dept: REHABILITATION | Facility: HOSPITAL | Age: 70
End: 2025-05-09
Payer: MEDICARE

## 2025-05-09 DIAGNOSIS — R68.89 DECREASED FUNCTIONAL ACTIVITY TOLERANCE: ICD-10-CM

## 2025-05-09 DIAGNOSIS — M25.60 DECREASED RANGE OF MOTION WITH DECREASED STRENGTH: Primary | ICD-10-CM

## 2025-05-09 DIAGNOSIS — R53.1 DECREASED RANGE OF MOTION WITH DECREASED STRENGTH: Primary | ICD-10-CM

## 2025-05-09 PROCEDURE — 97110 THERAPEUTIC EXERCISES: CPT | Mod: PN

## 2025-05-09 PROCEDURE — 97112 NEUROMUSCULAR REEDUCATION: CPT | Mod: PN

## 2025-05-09 NOTE — PROGRESS NOTES
"  Outpatient Rehab    Physical Therapy Visit    Patient Name: Rachelle Philip  MRN: [id number]  YOB: 1955  Encounter Date: 5/9/2025    Therapy Diagnosis:   Encounter Diagnoses   Name Primary?    Decreased range of motion with decreased strength Yes    Decreased functional activity tolerance      Physician: Americo Sandy MD    Physician Orders: Eval and Treat  Medical Diagnosis: Lumbar radiculopathy  Lumbar stenosis with neurogenic claudication    Visit # / Visits Authorized:  1 / 12  Insurance Authorization Period: 4/30/2025 to 12/31/2025  Date of Evaluation: 5/1/2025  Plan of Care Certification: 5/1/2025 to 6/28/2025      PT/PTA:     Number of PTA visits since last PT visit:   Time In: 0850   Time Out: 0945  Total Time (in minutes): 55   Total Billable Time (in minutes): 55    FOTO:  Intake Score: 47%  Survey Score 2:  %  Survey Score 3:  %         Subjective   States she is doing ok but has back pain and taking medication.  Pain reported as 6/10. Low back pain    Objective            Treatment:       CPT Intervention    Duration / Intensity      X TE NuStep 5 minutes   X TE Seated ball rollout X30   X NMR Sit to stand  20"  2x10   X NMR MedX extension   Too painful 20# x10   X NMR Posterior pelvic tilt X20   X TE DKTC on ball X30   X TE Lower trunk rotation on ball     X NMR Supine clamshell YTB 2x10                                                                 PLAN           Time Entry(in minutes):  Neuromuscular Re-Education Time Entry: 25  Therapeutic Exercise Time Entry: 30    Assessment & Plan   Assessment: The patient returns for first treatment after evaluation.  She is controlling pain with medication.  She was instructed in tretching and core strengthening woth moderate cues.  She had difficulty tolerating MedX trunk extension and rotation was not attempted  Evaluation/Treatment Tolerance: Patient limited by pain    Patient will continue to benefit from skilled outpatient physical therapy " to address the deficits listed in the problem list box on initial evaluation, provide pt/family education and to maximize pt's level of independence in the home and community environment.     Patient's spiritual, cultural, and educational needs considered and patient agreeable to plan of care and goals.           Plan: Outpatient Physical Therapy to include any combination of the following interventions: virtual visits, dry needling, modalities, electrical stimulation (IFC, Pre-Mod, Attended with Functional Dry Needling), Manual Therapy, Moist Heat/ Ice, Neuromuscular Re-ed, Patient Education, Self Care, Therapeutic Exercise, Functional Training, and Therapeutic Activites    Goals:   Active       Functional outcome       Pt will improve FOTO disability score to 58% disability or less in order to improve overall QOL and return to PLOF.         Start:  05/01/25    Expected End:  06/28/25               Home program       The patient will be independent in home program       Start:  05/01/25    Expected End:  05/31/25               Pain       Patient will report pain of 5/10 demonstrating a reduction of overall pain       Start:  05/01/25    Expected End:  05/31/25               Pain       Patient will report pain of 2/10 demonstrating a reduction of overall pain       Start:  05/01/25    Expected End:  06/28/25               Range of Motion       Patient will achieve spinal flexion to 80%       Start:  05/01/25    Expected End:  06/28/25               Strength       Patient will achieve bilateral hip extension strength of 4/5       Start:  05/01/25    Expected End:  06/28/25                Lopez Shell, PT

## 2025-05-15 ENCOUNTER — CLINICAL SUPPORT (OUTPATIENT)
Dept: REHABILITATION | Facility: HOSPITAL | Age: 70
End: 2025-05-15
Payer: MEDICARE

## 2025-05-15 DIAGNOSIS — M25.60 DECREASED RANGE OF MOTION WITH DECREASED STRENGTH: Primary | ICD-10-CM

## 2025-05-15 DIAGNOSIS — R53.1 DECREASED RANGE OF MOTION WITH DECREASED STRENGTH: Primary | ICD-10-CM

## 2025-05-15 DIAGNOSIS — R68.89 DECREASED FUNCTIONAL ACTIVITY TOLERANCE: ICD-10-CM

## 2025-05-15 PROCEDURE — 97112 NEUROMUSCULAR REEDUCATION: CPT | Mod: PN

## 2025-05-16 ENCOUNTER — CLINICAL SUPPORT (OUTPATIENT)
Dept: REHABILITATION | Facility: HOSPITAL | Age: 70
End: 2025-05-16
Payer: MEDICARE

## 2025-05-16 DIAGNOSIS — R53.1 DECREASED RANGE OF MOTION WITH DECREASED STRENGTH: Primary | ICD-10-CM

## 2025-05-16 DIAGNOSIS — M25.60 DECREASED RANGE OF MOTION WITH DECREASED STRENGTH: Primary | ICD-10-CM

## 2025-05-16 DIAGNOSIS — R68.89 DECREASED FUNCTIONAL ACTIVITY TOLERANCE: ICD-10-CM

## 2025-05-16 PROCEDURE — 97112 NEUROMUSCULAR REEDUCATION: CPT | Mod: PN

## 2025-05-16 PROCEDURE — 97110 THERAPEUTIC EXERCISES: CPT | Mod: PN

## 2025-05-16 NOTE — TELEPHONE ENCOUNTER
No care due was identified.  [facility] Embedded Care Due Messages. Reference number: [id number].   5/16/2025 12:13:47 PM CDT

## 2025-05-20 RX ORDER — INSULIN GLARGINE 100 [IU]/ML
INJECTION, SOLUTION SUBCUTANEOUS
Qty: 8 EACH | Refills: 0 | Status: SHIPPED | OUTPATIENT
Start: 2025-05-20

## 2025-05-20 NOTE — PROGRESS NOTES
"  Outpatient Rehab    Physical Therapy Visit    Patient Name: Rachelle Philip  MRN: [id number]  YOB: 1955  Encounter Date: 5/15/2025    Therapy Diagnosis:   Encounter Diagnoses   Name Primary?    Decreased range of motion with decreased strength Yes    Decreased functional activity tolerance      Physician: Americo Sandy MD    Physician Orders: Eval and Treat  Medical Diagnosis: Lumbar radiculopathy  Lumbar stenosis with neurogenic claudication    Visit # / Visits Authorized:  3 / 12  Insurance Authorization Period: 4/30/2025 to 12/31/2025  Date of Evaluation: 5/1/2025  Plan of Care Certification: 5/1/2025 to 6/28/2025      PT/PTA:     Number of PTA visits since last PT visit:   Time In: 0955   Time Out: 1050  Total Time (in minutes): 55   Total Billable Time (in minutes): 55    FOTO:  Intake Score:  %  Survey Score 2:  %  Survey Score 3:  %         Subjective   The patient states her shoulder feels better..  Pain reported as 5/10. Low back pain    Objective            Treatment:       CPT Intervention    Duration / Intensity      X TE NuStep 5 minutes   X TE Seated ball rollout X30   X NMR Sit to stand  20"  2x10   X NMR MedX extension   Too painful 20# x10   X NMR Posterior pelvic tilt X20   X TE DKTC on ball X30   X TE Lower trunk rotation on ball     X NMR Supine clamshell YTB 2x10                                                                 PLAN           Time Entry(in minutes):  Neuromuscular Re-Education Time Entry: 25  Therapeutic Exercise Time Entry: 30    Assessment & Plan   Assessment: The patient hads decreased shoulder pain and is performing lumbar core strengthening       Patient will continue to benefit from skilled outpatient physical therapy to address the deficits listed in the problem list box on initial evaluation, provide pt/family education and to maximize pt's level of independence in the home and community environment.     Patient's spiritual, cultural, and educational needs " considered and patient agreeable to plan of care and goals.           Plan: Outpatient Physical Therapy to include any combination of the following interventions: virtual visits, dry needling, modalities, electrical stimulation (IFC, Pre-Mod, Attended with Functional Dry Needling), Manual Therapy, Moist Heat/ Ice, Neuromuscular Re-ed, Patient Education, Self Care, Therapeutic Exercise, Functional Training, and Therapeutic Activites    Goals:   Active       Functional outcome       Pt will improve FOTO disability score to 58% disability or less in order to improve overall QOL and return to PLOF.         Start:  05/01/25    Expected End:  06/28/25               Home program       The patient will be independent in home program       Start:  05/01/25    Expected End:  05/31/25               Pain       Patient will report pain of 5/10 demonstrating a reduction of overall pain       Start:  05/01/25    Expected End:  05/31/25               Pain       Patient will report pain of 2/10 demonstrating a reduction of overall pain       Start:  05/01/25    Expected End:  06/28/25               Range of Motion       Patient will achieve spinal flexion to 80%       Start:  05/01/25    Expected End:  06/28/25               Strength       Patient will achieve bilateral hip extension strength of 4/5       Start:  05/01/25    Expected End:  06/28/25                Lopez Shell, PT

## 2025-05-21 DIAGNOSIS — Z79.4 TYPE 2 DIABETES MELLITUS WITH OTHER SPECIFIED COMPLICATION, WITH LONG-TERM CURRENT USE OF INSULIN: ICD-10-CM

## 2025-05-21 DIAGNOSIS — E11.69 TYPE 2 DIABETES MELLITUS WITH OTHER SPECIFIED COMPLICATION, WITH LONG-TERM CURRENT USE OF INSULIN: ICD-10-CM

## 2025-05-21 RX ORDER — TIRZEPATIDE 2.5 MG/.5ML
2.5 INJECTION, SOLUTION SUBCUTANEOUS
Qty: 4 PEN | Refills: 0 | Status: SHIPPED | OUTPATIENT
Start: 2025-05-21

## 2025-05-21 NOTE — TELEPHONE ENCOUNTER
No care due was identified.  [facility] Embedded Care Due Messages. Reference number: [id number].   5/21/2025 12:22:32 PM CDT

## 2025-05-22 ENCOUNTER — CLINICAL SUPPORT (OUTPATIENT)
Dept: REHABILITATION | Facility: HOSPITAL | Age: 70
End: 2025-05-22
Payer: MEDICARE

## 2025-05-22 DIAGNOSIS — R68.89 DECREASED FUNCTIONAL ACTIVITY TOLERANCE: ICD-10-CM

## 2025-05-22 DIAGNOSIS — R53.1 DECREASED RANGE OF MOTION WITH DECREASED STRENGTH: Primary | ICD-10-CM

## 2025-05-22 DIAGNOSIS — M25.60 DECREASED RANGE OF MOTION WITH DECREASED STRENGTH: Primary | ICD-10-CM

## 2025-05-22 DIAGNOSIS — E11.69 DYSLIPIDEMIA ASSOCIATED WITH TYPE 2 DIABETES MELLITUS: ICD-10-CM

## 2025-05-22 DIAGNOSIS — E78.5 DYSLIPIDEMIA ASSOCIATED WITH TYPE 2 DIABETES MELLITUS: ICD-10-CM

## 2025-05-22 PROCEDURE — 97112 NEUROMUSCULAR REEDUCATION: CPT | Mod: PN

## 2025-05-22 PROCEDURE — 97110 THERAPEUTIC EXERCISES: CPT | Mod: PN

## 2025-05-22 RX ORDER — ATORVASTATIN CALCIUM 80 MG/1
80 TABLET, FILM COATED ORAL
Qty: 90 TABLET | Refills: 3 | Status: SHIPPED | OUTPATIENT
Start: 2025-05-22

## 2025-05-23 ENCOUNTER — CLINICAL SUPPORT (OUTPATIENT)
Dept: REHABILITATION | Facility: HOSPITAL | Age: 70
End: 2025-05-23
Payer: MEDICARE

## 2025-05-23 DIAGNOSIS — M25.60 DECREASED RANGE OF MOTION WITH DECREASED STRENGTH: Primary | ICD-10-CM

## 2025-05-23 DIAGNOSIS — R68.89 DECREASED FUNCTIONAL ACTIVITY TOLERANCE: ICD-10-CM

## 2025-05-23 DIAGNOSIS — R53.1 DECREASED RANGE OF MOTION WITH DECREASED STRENGTH: Primary | ICD-10-CM

## 2025-05-23 PROCEDURE — 97110 THERAPEUTIC EXERCISES: CPT | Mod: PN

## 2025-05-23 PROCEDURE — 97112 NEUROMUSCULAR REEDUCATION: CPT | Mod: PN

## 2025-05-24 DIAGNOSIS — Z79.4 TYPE 2 DIABETES MELLITUS WITH OTHER SPECIFIED COMPLICATION, WITH LONG-TERM CURRENT USE OF INSULIN: ICD-10-CM

## 2025-05-24 DIAGNOSIS — Z79.4 TYPE 2 DIABETES MELLITUS WITHOUT COMPLICATION, WITH LONG-TERM CURRENT USE OF INSULIN: ICD-10-CM

## 2025-05-24 DIAGNOSIS — E11.9 TYPE 2 DIABETES MELLITUS WITHOUT COMPLICATION, WITH LONG-TERM CURRENT USE OF INSULIN: ICD-10-CM

## 2025-05-24 DIAGNOSIS — E11.69 TYPE 2 DIABETES MELLITUS WITH OTHER SPECIFIED COMPLICATION, WITH LONG-TERM CURRENT USE OF INSULIN: ICD-10-CM

## 2025-05-24 NOTE — TELEPHONE ENCOUNTER
No care due was identified.  [facility] Embedded Care Due Messages. Reference number: [id number].   5/24/2025 10:59:33 AM CDT

## 2025-05-24 NOTE — TELEPHONE ENCOUNTER
No care due was identified.  [facility] Embedded Care Due Messages. Reference number: [id number].   5/24/2025 10:56:59 AM CDT

## 2025-05-24 NOTE — TELEPHONE ENCOUNTER
No care due was identified.  [facility] Embedded Care Due Messages. Reference number: [id number].   5/24/2025 11:03:24 AM CDT

## 2025-05-25 NOTE — PROGRESS NOTES
"  Outpatient Rehab    Physical Therapy Visit    Patient Name: Rachelle Philip  MRN: [id number]  YOB: 1955  Encounter Date: 5/22/2025    Therapy Diagnosis:   Encounter Diagnoses   Name Primary?    Decreased range of motion with decreased strength Yes    Decreased functional activity tolerance        Physician: Americo Sandy MD    Physician Orders: Eval and Treat  Medical Diagnosis: Lumbar radiculopathy  Lumbar stenosis with neurogenic claudication    Visit # / Visits Authorized:  5 / 12  Insurance Authorization Period: 4/30/2025 to 12/31/2025  Date of Evaluation: 5/1/2025  Plan of Care Certification: 5/1/2025 to 6/28/2025      PT/PTA:     Number of PTA visits since last PT visit:   Time In: 1000   Time Out: 1054  Total Time (in minutes): 54   Total Billable Time (in minutes):      FOTO:  Intake Score: 47%  Survey Score 2:  %  Survey Score 3:  %         Subjective   The patient reports right shoulder and low back pain.  Pain reported as 5/10.      Objective            Treatment:       CPT Intervention    Duration / Intensity      X TE NuStep 5 minutes   X TE Seated ball rollout X30   X NMR Sit to stand  20"  2x10   X NMR Posterior pelvic tilt X30   X TE DKTC on ball X30   X TE Lower trunk rotation on ball X30   X NMR Supine clamshell RTB 2x10   X NMR Shuttle  2 min 4 bands   X NMR Paloff press 20# 2x10   X NMR Serratus punch  0# 2x10   X NMR Bridges 2x15   X TE QL stretch 3x20 sec   X NMR HSA RTB 2x10   X TE SL ER 1# 2x10   X NMR Cervical Chin Tuck w/ Protraction 2x10   X NMR Open Books 2x10     PLAN           Time Entry(in minutes):  Neuromuscular Re-Education Time Entry: 24  Therapeutic Exercise Time Entry: 30    Assessment & Plan   Assessment: The patient is progressing with core strengthening as well as postural and thoracic strengthening to improve pain-free mobility. The patient is performing stretching activities, as well as positioning to improve performance of exercise exercises without pain.   "     Patient will continue to benefit from skilled outpatient physical therapy to address the deficits listed in the problem list box on initial evaluation, provide pt/family education and to maximize pt's level of independence in the home and community environment.     Patient's spiritual, cultural, and educational needs considered and patient agreeable to plan of care and goals.           Plan: Outpatient Physical Therapy to include any combination of the following interventions: virtual visits, dry needling, modalities, electrical stimulation (IFC, Pre-Mod, Attended with Functional Dry Needling), Manual Therapy, Moist Heat/ Ice, Neuromuscular Re-ed, Patient Education, Self Care, Therapeutic Exercise, Functional Training, and Therapeutic Activites    Goals:   Active       Functional outcome       Pt will improve FOTO disability score to 58% disability or less in order to improve overall QOL and return to PLOF.         Start:  05/01/25    Expected End:  06/28/25               Home program       The patient will be independent in home program       Start:  05/01/25    Expected End:  05/31/25               Pain       Patient will report pain of 5/10 demonstrating a reduction of overall pain       Start:  05/01/25    Expected End:  05/31/25               Pain       Patient will report pain of 2/10 demonstrating a reduction of overall pain       Start:  05/01/25    Expected End:  06/28/25               Range of Motion       Patient will achieve spinal flexion to 80%       Start:  05/01/25    Expected End:  06/28/25               Strength       Patient will achieve bilateral hip extension strength of 4/5       Start:  05/01/25    Expected End:  06/28/25                  Lopez Shell, PT

## 2025-05-25 NOTE — PROGRESS NOTES
"  Outpatient Rehab    Physical Therapy Visit    Patient Name: Rachelle Philip  MRN: [id number]  YOB: 1955  Encounter Date: 5/16/2025    Therapy Diagnosis:   Encounter Diagnoses   Name Primary?    Decreased range of motion with decreased strength Yes    Decreased functional activity tolerance        Physician: Americo Sandy MD    Physician Orders: Eval and Treat  Medical Diagnosis: Lumbar radiculopathy  Lumbar stenosis with neurogenic claudication    Visit # / Visits Authorized:  5 / 12  Insurance Authorization Period: 4/30/2025 to 12/31/2025  Date of Evaluation: 5/1/2025  Plan of Care Certification: 5/1/2025 to 6/28/2025      PT/PTA:     Number of PTA visits since last PT visit:   Time In: 1400   Time Out: 1455  Total Time (in minutes): 55   Total Billable Time (in minutes): 55    FOTO:  Intake Score: 47%  Survey Score 2:  %  Survey Score 3:  %         Subjective   The patient reports right shoulder and mid scapula pain as well as low back pain into the right leg.  Pain reported as 5/10.      Objective            Treatment:       CPT Intervention    Duration / Intensity      X TE NuStep 5 minutes   X TE Seated ball rollout X30   X NMR Sit to stand  20"  2x10   X NMR MedX extension   Too painful 20# x10   X NMR Posterior pelvic tilt X20   X TE DKTC on ball X30   X TE Lower trunk rotation on ball     X NMR Supine clamshell YTB 2x10                                                                 PLAN           Time Entry(in minutes):  Neuromuscular Re-Education Time Entry: 25  Therapeutic Exercise Time Entry: 30    Assessment & Plan   Assessment: The patient is progressing physical therapy with core strengthening, as well as upper back strengthening and range of motion activity to improve function.  Evaluation/Treatment Tolerance: Patient tolerated treatment well    Patient will continue to benefit from skilled outpatient physical therapy to address the deficits listed in the problem list box on initial " evaluation, provide pt/family education and to maximize pt's level of independence in the home and community environment.     Patient's spiritual, cultural, and educational needs considered and patient agreeable to plan of care and goals.           Plan: Outpatient Physical Therapy to include any combination of the following interventions: virtual visits, dry needling, modalities, electrical stimulation (IFC, Pre-Mod, Attended with Functional Dry Needling), Manual Therapy, Moist Heat/ Ice, Neuromuscular Re-ed, Patient Education, Self Care, Therapeutic Exercise, Functional Training, and Therapeutic Activites    Goals:   Active       Functional outcome       Pt will improve FOTO disability score to 58% disability or less in order to improve overall QOL and return to PLOF.         Start:  05/01/25    Expected End:  06/28/25               Home program       The patient will be independent in home program       Start:  05/01/25    Expected End:  05/31/25               Pain       Patient will report pain of 5/10 demonstrating a reduction of overall pain       Start:  05/01/25    Expected End:  05/31/25               Pain       Patient will report pain of 2/10 demonstrating a reduction of overall pain       Start:  05/01/25    Expected End:  06/28/25               Range of Motion       Patient will achieve spinal flexion to 80%       Start:  05/01/25    Expected End:  06/28/25               Strength       Patient will achieve bilateral hip extension strength of 4/5       Start:  05/01/25    Expected End:  06/28/25                  Lopez Shell, PT

## 2025-05-26 NOTE — PROGRESS NOTES
"  Outpatient Rehab    Physical Therapy Visit    Patient Name: Rachelle Philip  MRN: [id number]  YOB: 1955  Encounter Date: 5/23/2025    Therapy Diagnosis:   Encounter Diagnoses   Name Primary?    Decreased range of motion with decreased strength Yes    Decreased functional activity tolerance        Physician: Americo Sandy MD    Physician Orders: Eval and Treat  Medical Diagnosis: Lumbar radiculopathy  Lumbar stenosis with neurogenic claudication    Visit # / Visits Authorized:  5 / 12  Insurance Authorization Period: 4/30/2025 to 12/31/2025  Date of Evaluation: 5/1/2025  Plan of Care Certification: 5/1/2025 to 6/28/2025      PT/PTA:     Number of PTA visits since last PT visit:   Time In: 1400   Time Out: 1453  Total Time (in minutes): 53   Total Billable Time (in minutes): 53    FOTO:  Intake Score: 47%  Survey Score 2:  %  Survey Score 3:  %         Subjective   The patient reports low back pain right shoulder and mid back pain.         Objective            Treatment:       CPT Intervention    Duration / Intensity      X TE NuStep 5 minutes   X TE Seated ball rollout X30   X NMR Sit to stand  20"  2x10   X NMR Posterior pelvic tilt X30   X TE DKTC on ball X30   X TE Lower trunk rotation on ball X30   X NMR Supine clamshell RTB 2x10   X NMR Shuttle  2 min 4 bands   X NMR Paloff press 20# 2x10   X NMR Serratus punch  0# 2x10   X NMR Bridges 2x15   X TE QL stretch 3x20 sec   X NMR HSA RTB 2x10   X TE SL ER 1# 2x10   X NMR Cervical Chin Tuck w/ Protraction 2x10   X NMR Open Books 2x10     PLAN           Time Entry(in minutes):  Neuromuscular Re-Education Time Entry: 23  Therapeutic Exercise Time Entry: 30    Assessment & Plan   Assessment: Through the use of an , the patient was instructed in thoracic and shoulder strengthening as well as core strengthening with range of motion, activities, such as open books to improve upper extremity and lower extremity range of motion and flexibility.   "     Patient will continue to benefit from skilled outpatient physical therapy to address the deficits listed in the problem list box on initial evaluation, provide pt/family education and to maximize pt's level of independence in the home and community environment.     Patient's spiritual, cultural, and educational needs considered and patient agreeable to plan of care and goals.           Plan: Outpatient Physical Therapy to include any combination of the following interventions: virtual visits, dry needling, modalities, electrical stimulation (IFC, Pre-Mod, Attended with Functional Dry Needling), Manual Therapy, Moist Heat/ Ice, Neuromuscular Re-ed, Patient Education, Self Care, Therapeutic Exercise, Functional Training, and Therapeutic Activites    Goals:   Active       Functional outcome       Pt will improve FOTO disability score to 58% disability or less in order to improve overall QOL and return to PLOF.         Start:  05/01/25    Expected End:  06/28/25               Home program       The patient will be independent in home program       Start:  05/01/25    Expected End:  05/31/25               Pain       Patient will report pain of 5/10 demonstrating a reduction of overall pain       Start:  05/01/25    Expected End:  05/31/25               Pain       Patient will report pain of 2/10 demonstrating a reduction of overall pain       Start:  05/01/25    Expected End:  06/28/25               Range of Motion       Patient will achieve spinal flexion to 80%       Start:  05/01/25    Expected End:  06/28/25               Strength       Patient will achieve bilateral hip extension strength of 4/5       Start:  05/01/25    Expected End:  06/28/25                  Lopez Shell, PT

## 2025-05-27 RX ORDER — CALCIUM CITRATE/VITAMIN D3 200MG-6.25
TABLET ORAL
Qty: 300 EACH | Refills: 3 | Status: SHIPPED | OUTPATIENT
Start: 2025-05-27

## 2025-05-27 RX ORDER — TIRZEPATIDE 2.5 MG/.5ML
2.5 INJECTION, SOLUTION SUBCUTANEOUS
Qty: 4 PEN | Refills: 0 | Status: SHIPPED | OUTPATIENT
Start: 2025-05-27

## 2025-05-27 RX ORDER — INSULIN GLARGINE 100 [IU]/ML
INJECTION, SOLUTION SUBCUTANEOUS
Qty: 8 EACH | Refills: 0 | Status: SHIPPED | OUTPATIENT
Start: 2025-05-27

## 2025-05-29 ENCOUNTER — CLINICAL SUPPORT (OUTPATIENT)
Dept: REHABILITATION | Facility: HOSPITAL | Age: 70
End: 2025-05-29
Payer: MEDICARE

## 2025-05-29 DIAGNOSIS — R53.1 DECREASED RANGE OF MOTION WITH DECREASED STRENGTH: Primary | ICD-10-CM

## 2025-05-29 DIAGNOSIS — M25.60 DECREASED RANGE OF MOTION WITH DECREASED STRENGTH: Primary | ICD-10-CM

## 2025-05-29 DIAGNOSIS — R68.89 DECREASED FUNCTIONAL ACTIVITY TOLERANCE: ICD-10-CM

## 2025-05-29 PROCEDURE — 97112 NEUROMUSCULAR REEDUCATION: CPT | Mod: PN

## 2025-05-29 PROCEDURE — 97110 THERAPEUTIC EXERCISES: CPT | Mod: PN

## 2025-06-05 ENCOUNTER — TELEPHONE (OUTPATIENT)
Dept: FAMILY MEDICINE | Facility: CLINIC | Age: 70
End: 2025-06-05
Payer: MEDICARE

## 2025-06-06 ENCOUNTER — TELEPHONE (OUTPATIENT)
Dept: FAMILY MEDICINE | Facility: CLINIC | Age: 70
End: 2025-06-06
Payer: MEDICARE

## 2025-06-13 RX ORDER — ALBUTEROL SULFATE 90 UG/1
2 INHALANT RESPIRATORY (INHALATION) EVERY 6 HOURS PRN
Qty: 18 G | Refills: 0 | Status: SHIPPED | OUTPATIENT
Start: 2025-06-13

## 2025-06-13 RX ORDER — FLUTICASONE PROPIONATE AND SALMETEROL 250; 50 UG/1; UG/1
1 POWDER RESPIRATORY (INHALATION) 2 TIMES DAILY
Qty: 60 EACH | Refills: 0 | Status: SHIPPED | OUTPATIENT
Start: 2025-06-13

## 2025-06-13 NOTE — TELEPHONE ENCOUNTER
No care due was identified.  [facility] Embedded Care Due Messages. Reference number: [id number].   6/12/2025 10:40:35 PM CDT

## 2025-06-13 NOTE — TELEPHONE ENCOUNTER
No care due was identified.  [facility] Embedded Care Due Messages. Reference number: [id number].   6/12/2025 10:38:37 PM CDT

## 2025-06-20 ENCOUNTER — OFFICE VISIT (OUTPATIENT)
Dept: FAMILY MEDICINE | Facility: CLINIC | Age: 70
End: 2025-06-20
Attending: FAMILY MEDICINE
Payer: MEDICARE

## 2025-06-20 ENCOUNTER — PATIENT MESSAGE (OUTPATIENT)
Dept: ALLERGY | Facility: CLINIC | Age: 70
End: 2025-06-20
Payer: MEDICARE

## 2025-06-20 VITALS
SYSTOLIC BLOOD PRESSURE: 118 MMHG | HEART RATE: 83 BPM | WEIGHT: 142.44 LBS | BODY MASS INDEX: 26.89 KG/M2 | OXYGEN SATURATION: 99 % | HEIGHT: 61 IN | TEMPERATURE: 97 F | DIASTOLIC BLOOD PRESSURE: 76 MMHG

## 2025-06-20 DIAGNOSIS — J06.9 UPPER RESPIRATORY TRACT INFECTION, UNSPECIFIED TYPE: ICD-10-CM

## 2025-06-20 DIAGNOSIS — E11.69 DM TYPE 2 WITH DIABETIC DYSLIPIDEMIA: ICD-10-CM

## 2025-06-20 DIAGNOSIS — R11.0 NAUSEA: Primary | ICD-10-CM

## 2025-06-20 DIAGNOSIS — E78.5 DM TYPE 2 WITH DIABETIC DYSLIPIDEMIA: ICD-10-CM

## 2025-06-20 PROCEDURE — 99999 PR PBB SHADOW E&M-EST. PATIENT-LVL V: CPT | Mod: PBBFAC,,, | Performed by: FAMILY MEDICINE

## 2025-06-20 PROCEDURE — 99215 OFFICE O/P EST HI 40 MIN: CPT | Mod: PBBFAC,PO | Performed by: FAMILY MEDICINE

## 2025-06-20 RX ORDER — ONDANSETRON 8 MG/1
8 TABLET, ORALLY DISINTEGRATING ORAL ONCE
Qty: 30 TABLET | Refills: 0 | Status: SHIPPED | OUTPATIENT
Start: 2025-06-20 | End: 2025-06-20

## 2025-06-20 RX ORDER — INSULIN GLARGINE 100 [IU]/ML
INJECTION, SOLUTION SUBCUTANEOUS
Qty: 30 EACH | Refills: 1 | Status: SHIPPED | OUTPATIENT
Start: 2025-06-20

## 2025-06-20 RX ORDER — GUAIFENESIN 1200 MG/1
TABLET, EXTENDED RELEASE ORAL
Qty: 14 TABLET | Refills: 0 | Status: SHIPPED | OUTPATIENT
Start: 2025-06-20

## 2025-06-20 RX ORDER — TIRZEPATIDE 5 MG/.5ML
5 INJECTION, SOLUTION SUBCUTANEOUS
Qty: 4 PEN | Refills: 0 | Status: SHIPPED | OUTPATIENT
Start: 2025-06-20

## 2025-06-20 RX ORDER — INSULIN GLARGINE 100 [IU]/ML
INJECTION, SOLUTION SUBCUTANEOUS
Qty: 8 EACH | Refills: 0 | Status: SHIPPED | OUTPATIENT
Start: 2025-06-20 | End: 2025-06-20 | Stop reason: SDUPTHER

## 2025-06-20 RX ORDER — PROMETHAZINE HYDROCHLORIDE AND DEXTROMETHORPHAN HYDROBROMIDE 6.25; 15 MG/5ML; MG/5ML
5 SYRUP ORAL EVERY 4 HOURS PRN
Qty: 118 ML | Refills: 0 | Status: SHIPPED | OUTPATIENT
Start: 2025-06-20 | End: 2025-06-30

## 2025-06-20 NOTE — PROGRESS NOTES
Subjective:       Patient ID: Rachelle Philip is a 69 y.o. female.    Chief Complaint: Follow-up    69 y old female with uncontrolled t2 dm  here for f.u after increasing Lantus  dose and starting Mounjaro . FBS: 130 . She has lost 3 lbs . She does have nausea. Also with an URI for 1 w  . Mainly with cough and nasal congestion . No sob , cp .       Review of Systems   Constitutional: Negative.    HENT:  Positive for postnasal drip and sinus pressure.    Eyes: Negative.    Respiratory:  Positive for cough.    Cardiovascular: Negative.    Gastrointestinal: Negative.    Genitourinary: Negative.    Musculoskeletal: Negative.    Skin: Negative.    Hematological: Negative.        Objective:      Physical Exam  Constitutional:       General: She is not in acute distress.     Appearance: She is well-developed. She is not diaphoretic.   HENT:      Head: Normocephalic and atraumatic.      Right Ear: External ear normal.      Left Ear: External ear normal.      Mouth/Throat:      Pharynx: No oropharyngeal exudate.   Eyes:      General: No scleral icterus.        Right eye: No discharge.         Left eye: No discharge.      Conjunctiva/sclera: Conjunctivae normal.      Pupils: Pupils are equal, round, and reactive to light.   Neck:      Thyroid: No thyromegaly.      Vascular: No JVD.      Trachea: No tracheal deviation.   Cardiovascular:      Rate and Rhythm: Normal rate and regular rhythm.      Heart sounds: Normal heart sounds. No murmur heard.     No friction rub. No gallop.   Pulmonary:      Effort: Pulmonary effort is normal. No respiratory distress.      Breath sounds: Normal breath sounds. No stridor. No wheezing or rales.   Chest:      Chest wall: No tenderness.   Abdominal:      General: Bowel sounds are normal. There is no distension.      Palpations: Abdomen is soft.      Tenderness: There is no abdominal tenderness. There is no guarding or rebound.   Musculoskeletal:         General: Normal range of motion.      Cervical  back: Normal range of motion and neck supple.   Lymphadenopathy:      Cervical: No cervical adenopathy.   Skin:     General: Skin is warm and dry.   Neurological:      Mental Status: She is alert and oriented to person, place, and time.   Psychiatric:         Mood and Affect: Mood normal.         Behavior: Behavior normal.         Thought Content: Thought content normal.         Judgment: Judgment normal.         Assessment:       1. Nausea    2. DM type 2 with diabetic dyslipidemia    3. Upper respiratory tract infection, unspecified type        Plan:     1.- Prn zofran   2.- Improving . Start monitoring glucose 2 hrs pp .   3.- Symptomatic treatment . Prn f.u

## 2025-06-20 NOTE — TELEPHONE ENCOUNTER
Copied from CRM #[id number]. Topic: Medications - Pharmacy  >> Jun 20, 2025  3:03 PM Elsy wrote:  Type:  Pharmacy Calling to Clarify an RX    Name of Caller:  Pharmacy Name:  [facility] [address] [place] [id number] [facility]  [id number] [facility] [id number]  Prescription Name:insulin glargine U-100, Lantus, (LANTUS SOLOSTAR U-100 INSULIN) 100 unit/mL (3 mL) InPn pen  What do they need to clarify?:they were calling back because they did not get the get it not. She is there waiting on it.  Best Call Back Number:Phone: [phone] Fax: [phone]  Additional Information: They wanted it marked as urgent.

## 2025-06-20 NOTE — TELEPHONE ENCOUNTER
No care due was identified.  [facility] Embedded Care Due Messages. Reference number: [id number].   6/20/2025 3:14:08 PM CDT

## 2025-06-20 NOTE — TELEPHONE ENCOUNTER
Copied from CRM #[id number]. Topic: Medications - Medication Refill  >> Jun 20, 2025  3:14 PM Rhiannon wrote:  Pt is at pharmacy now and they don't have a script for the pt's insulin  Pt states the pharmacy called but no reply.  Pt states she has been w/o the insulin for 2 days and levels are very high.  She states if she gets sick, your office will be responsible.    Type:  RX Refill Request    Who Called:  pt  Refill or New Rx: refill  RX Name and Strength: insulin glargine U-100, Lantus, (LANTUS SOLOSTAR U-100 INSULIN) 100 unit/mL (3 mL) InPn pen  How is the patient currently taking it? (ex. 1XDay):  Is this a 30 day or 90 day RX:  Preferred Pharmacy with phone number:  Local or Mail Order: local  Ordering Provider: christina  Would the patient rather a call back or a response via [facility]? phone  Best Call Back Number: [phone]   Additional Information:

## 2025-06-23 ENCOUNTER — TELEPHONE (OUTPATIENT)
Dept: FAMILY MEDICINE | Facility: CLINIC | Age: 70
End: 2025-06-23
Payer: MEDICARE

## 2025-06-23 ENCOUNTER — PATIENT MESSAGE (OUTPATIENT)
Dept: ADMINISTRATIVE | Facility: CLINIC | Age: 70
End: 2025-06-23
Payer: MEDICARE

## 2025-06-26 RX ORDER — GUAIFENESIN 1200 MG/1
TABLET, EXTENDED RELEASE ORAL
Qty: 14 TABLET | Refills: 0 | Status: SHIPPED | OUTPATIENT
Start: 2025-06-26

## 2025-07-23 DIAGNOSIS — E11.9 TYPE 2 DIABETES MELLITUS WITHOUT COMPLICATION: ICD-10-CM

## 2025-07-25 ENCOUNTER — TELEPHONE (OUTPATIENT)
Dept: FAMILY MEDICINE | Facility: CLINIC | Age: 70
End: 2025-07-25

## 2025-07-25 ENCOUNTER — OFFICE VISIT (OUTPATIENT)
Dept: FAMILY MEDICINE | Facility: CLINIC | Age: 70
End: 2025-07-25
Attending: FAMILY MEDICINE
Payer: MEDICARE

## 2025-07-25 ENCOUNTER — HOSPITAL ENCOUNTER (OUTPATIENT)
Dept: RADIOLOGY | Facility: HOSPITAL | Age: 70
Discharge: HOME | End: 2025-07-25
Attending: FAMILY MEDICINE
Payer: MEDICARE

## 2025-07-25 VITALS
BODY MASS INDEX: 26.81 KG/M2 | WEIGHT: 142 LBS | SYSTOLIC BLOOD PRESSURE: 128 MMHG | OXYGEN SATURATION: 98 % | TEMPERATURE: 97 F | HEIGHT: 61 IN | HEART RATE: 81 BPM | DIASTOLIC BLOOD PRESSURE: 80 MMHG

## 2025-07-25 DIAGNOSIS — R05.3 CHRONIC COUGH: ICD-10-CM

## 2025-07-25 DIAGNOSIS — E11.69 DM TYPE 2 WITH DIABETIC DYSLIPIDEMIA: ICD-10-CM

## 2025-07-25 DIAGNOSIS — E78.5 DM TYPE 2 WITH DIABETIC DYSLIPIDEMIA: ICD-10-CM

## 2025-07-25 DIAGNOSIS — M43.16 SPONDYLOLISTHESIS, LUMBAR REGION: ICD-10-CM

## 2025-07-25 DIAGNOSIS — M54.2 NECK PAIN: Primary | ICD-10-CM

## 2025-07-25 DIAGNOSIS — H93.19 TINNITUS, UNSPECIFIED LATERALITY: ICD-10-CM

## 2025-07-25 DIAGNOSIS — J31.0 CHRONIC RHINITIS: ICD-10-CM

## 2025-07-25 DIAGNOSIS — M54.2 NECK PAIN: ICD-10-CM

## 2025-07-25 DIAGNOSIS — M54.16 LUMBAR RADICULOPATHY: ICD-10-CM

## 2025-07-25 DIAGNOSIS — Z12.11 COLON CANCER SCREENING: ICD-10-CM

## 2025-07-25 DIAGNOSIS — R09.82 POST-NASAL DRIP: ICD-10-CM

## 2025-07-25 PROCEDURE — 72040 X-RAY EXAM NECK SPINE 2-3 VW: CPT | Mod: 26,,, | Performed by: RADIOLOGY

## 2025-07-25 PROCEDURE — 99215 OFFICE O/P EST HI 40 MIN: CPT | Mod: PBBFAC,PO | Performed by: FAMILY MEDICINE

## 2025-07-25 PROCEDURE — 72040 X-RAY EXAM NECK SPINE 2-3 VW: CPT | Mod: TC,PO

## 2025-07-25 PROCEDURE — 99999 PR PBB SHADOW E&M-EST. PATIENT-LVL V: CPT | Mod: PBBFAC,,, | Performed by: FAMILY MEDICINE

## 2025-07-25 RX ORDER — FLUTICASONE PROPIONATE AND SALMETEROL 250; 50 UG/1; UG/1
1 POWDER RESPIRATORY (INHALATION) 2 TIMES DAILY
Qty: 60 EACH | Refills: 0 | Status: SHIPPED | OUTPATIENT
Start: 2025-07-25

## 2025-07-25 RX ORDER — HYDROCODONE BITARTRATE AND ACETAMINOPHEN 5; 325 MG/1; MG/1
1 TABLET ORAL EVERY 6 HOURS PRN
Qty: 12 TABLET | Refills: 0 | Status: SHIPPED | OUTPATIENT
Start: 2025-07-25

## 2025-07-25 RX ORDER — FLUTICASONE PROPIONATE 50 MCG
1 SPRAY, SUSPENSION (ML) NASAL 2 TIMES DAILY
Qty: 16 G | Refills: 11 | Status: SHIPPED | OUTPATIENT
Start: 2025-07-25 | End: 2026-07-25

## 2025-07-25 NOTE — PROGRESS NOTES
Subjective:       Patient ID: Rachelle Philip is a 69 y.o. female.    Chief Complaint: Follow-up (For diabetes)    69 y old female with t2 dm , htn , dlp , lumbar spondylosis , asthma variant , AR here for f.u . Not doing well. She has R scapula pain . This started 2 w ago . No injuries. Associated with neck pain and RUE paresthesias  . Not taking any meds. Her fbs and 2 hrs pp have been in the 180's, also has been dealing with  dyspepsia ,abdominal cramps, nausea and constipation since starting MOunjaro  . She also has been having  l ear tinnitus . Lastly , she will like to f.u with the spine clinic since she still has LE paresthesias.       Review of Systems   Constitutional: Negative.    HENT: Negative.     Eyes: Negative.    Respiratory: Negative.     Cardiovascular: Negative.    Gastrointestinal: Negative.    Genitourinary: Negative.    Musculoskeletal:  Positive for arthralgias.   Skin: Negative.    Hematological: Negative.        Objective:      Physical Exam  Constitutional:       General: She is not in acute distress.     Appearance: She is well-developed. She is not diaphoretic.   HENT:      Head: Normocephalic and atraumatic.      Right Ear: External ear normal.      Left Ear: External ear normal.      Mouth/Throat:      Pharynx: No oropharyngeal exudate.   Eyes:      General: No scleral icterus.        Right eye: No discharge.         Left eye: No discharge.      Conjunctiva/sclera: Conjunctivae normal.      Pupils: Pupils are equal, round, and reactive to light.   Neck:      Thyroid: No thyromegaly.      Vascular: No JVD.      Trachea: No tracheal deviation.   Cardiovascular:      Rate and Rhythm: Normal rate and regular rhythm.      Heart sounds: Normal heart sounds. No murmur heard.     No friction rub. No gallop.   Pulmonary:      Effort: Pulmonary effort is normal. No respiratory distress.      Breath sounds: Normal breath sounds. No stridor. No wheezing or rales.   Chest:      Chest wall: No tenderness.    Abdominal:      General: Bowel sounds are normal. There is no distension.      Palpations: Abdomen is soft.      Tenderness: There is no abdominal tenderness. There is no guarding or rebound.   Musculoskeletal:         General: Normal range of motion.      Cervical back: Normal range of motion and neck supple. Spasms and bony tenderness present. Pain with movement present.   Lymphadenopathy:      Cervical: No cervical adenopathy.   Skin:     General: Skin is warm and dry.   Neurological:      Mental Status: She is alert and oriented to person, place, and time.   Psychiatric:         Mood and Affect: Mood normal.         Behavior: Behavior normal.         Thought Content: Thought content normal.         Judgment: Judgment normal.         Assessment:       1. Neck pain    2. Colon cancer screening    3. DM type 2 with diabetic dyslipidemia    4. Chronic rhinitis    5. Chronic cough    6. Post-nasal drip    7. Spondylolisthesis, lumbar region    8. Lumbar radiculopathy    9. Tinnitus, unspecified laterality        Plan:     1.- X rays   2.- FIT .   3.- Labs .   4.- Controlled. Med rf   5.-See No 4  6.- See No 5.  7.- f.u with the spine clinic   9.- See ENT

## 2025-07-31 ENCOUNTER — TELEPHONE (OUTPATIENT)
Dept: FAMILY MEDICINE | Facility: CLINIC | Age: 70
End: 2025-07-31

## 2025-07-31 ENCOUNTER — OFFICE VISIT (OUTPATIENT)
Dept: FAMILY MEDICINE | Facility: CLINIC | Age: 70
End: 2025-07-31
Attending: FAMILY MEDICINE
Payer: MEDICARE

## 2025-07-31 VITALS
HEIGHT: 61 IN | HEART RATE: 90 BPM | OXYGEN SATURATION: 96 % | SYSTOLIC BLOOD PRESSURE: 130 MMHG | BODY MASS INDEX: 27.36 KG/M2 | DIASTOLIC BLOOD PRESSURE: 80 MMHG | WEIGHT: 144.94 LBS | TEMPERATURE: 97 F

## 2025-07-31 DIAGNOSIS — E78.5 DYSLIPIDEMIA: ICD-10-CM

## 2025-07-31 DIAGNOSIS — E11.65 UNCONTROLLED TYPE 2 DIABETES MELLITUS WITH HYPERGLYCEMIA: Primary | ICD-10-CM

## 2025-07-31 PROCEDURE — 99214 OFFICE O/P EST MOD 30 MIN: CPT | Mod: S$PBB,,, | Performed by: FAMILY MEDICINE

## 2025-07-31 PROCEDURE — 99999 PR PBB SHADOW E&M-EST. PATIENT-LVL V: CPT | Mod: PBBFAC,,, | Performed by: FAMILY MEDICINE

## 2025-07-31 PROCEDURE — G2211 COMPLEX E/M VISIT ADD ON: HCPCS | Mod: ,,, | Performed by: FAMILY MEDICINE

## 2025-07-31 PROCEDURE — 99215 OFFICE O/P EST HI 40 MIN: CPT | Mod: PBBFAC,PO | Performed by: FAMILY MEDICINE

## 2025-07-31 RX ORDER — TRAZODONE HYDROCHLORIDE 100 MG/1
100 TABLET ORAL NIGHTLY
COMMUNITY
Start: 2025-07-28

## 2025-07-31 RX ORDER — BEMPEDOIC ACID 180 MG/1
1 TABLET, FILM COATED ORAL DAILY
Qty: 90 TABLET | Refills: 1 | Status: SHIPPED | OUTPATIENT
Start: 2025-07-31

## 2025-07-31 NOTE — PROGRESS NOTES
Subjective:       Patient ID: Rachelle Philip is a 69 y.o. female.    Chief Complaint: Follow-up    69 y old female here to discuss recent labs : uncontrolled t2 dm and dlp . She has been on Lantus 35 u and Mounjaro 5 mg over the last month. She is having unbearable  GI side effects with Mounjaro: dyspepsia, abdominal pain . She keeps a low carb diet . [facility] . She has also been compliant with Lipitor 80 mg and Zetia .       Review of Systems   Constitutional: Negative.    HENT: Negative.     Eyes: Negative.    Respiratory: Negative.     Cardiovascular: Negative.    Gastrointestinal: Negative.    Genitourinary: Negative.    Musculoskeletal: Negative.    Skin: Negative.    Hematological: Negative.        Objective:      Physical Exam  Constitutional:       General: She is not in acute distress.     Appearance: She is well-developed. She is not diaphoretic.   HENT:      Head: Normocephalic and atraumatic.      Right Ear: External ear normal.      Left Ear: External ear normal.      Mouth/Throat:      Pharynx: No oropharyngeal exudate.   Eyes:      General: No scleral icterus.        Right eye: No discharge.         Left eye: No discharge.      Conjunctiva/sclera: Conjunctivae normal.      Pupils: Pupils are equal, round, and reactive to light.   Neck:      Thyroid: No thyromegaly.      Vascular: No JVD.      Trachea: No tracheal deviation.   Cardiovascular:      Rate and Rhythm: Normal rate and regular rhythm.      Heart sounds: Normal heart sounds. No murmur heard.     No friction rub. No gallop.   Pulmonary:      Effort: Pulmonary effort is normal. No respiratory distress.      Breath sounds: Normal breath sounds. No stridor. No wheezing or rales.   Chest:      Chest wall: No tenderness.   Abdominal:      General: Bowel sounds are normal. There is no distension.      Palpations: Abdomen is soft.      Tenderness: There is no abdominal tenderness. There is no guarding or rebound.    Musculoskeletal:         General: Normal range of motion.      Cervical back: Normal range of motion and neck supple.   Lymphadenopathy:      Cervical: No cervical adenopathy.   Skin:     General: Skin is warm and dry.   Neurological:      Mental Status: She is alert and oriented to person, place, and time.   Psychiatric:         Mood and Affect: Mood normal.         Behavior: Behavior normal.         Thought Content: Thought content normal.         Judgment: Judgment normal.         Assessment:       1. Uncontrolled type 2 diabetes mellitus with hyperglycemia    2. Dyslipidemia        Plan:     1.- Uncontrolled. Stop smounjaro . Start Jardiance and increase Lantus by 4 units every 4 days  until FBS: < 150 and 2 hr pp< 180 . See DM management   2.- Start Bempedoic acid .   F.u in 3 m

## 2025-08-01 NOTE — TELEPHONE ENCOUNTER
Attempt to reach patient to schedule appointment with one of our pain providers. The patient did not answer. Left voice message on patients voice box to call back at earliest convenience to schedule apt.

## 2025-08-21 ENCOUNTER — LAB VISIT (OUTPATIENT)
Dept: LAB | Facility: HOSPITAL | Age: 70
End: 2025-08-21
Attending: STUDENT IN AN ORGANIZED HEALTH CARE EDUCATION/TRAINING PROGRAM
Payer: MEDICARE

## 2025-08-21 ENCOUNTER — OFFICE VISIT (OUTPATIENT)
Dept: DIABETES | Facility: CLINIC | Age: 70
End: 2025-08-21
Payer: MEDICARE

## 2025-08-21 ENCOUNTER — OFFICE VISIT (OUTPATIENT)
Dept: ALLERGY | Facility: CLINIC | Age: 70
End: 2025-08-21
Payer: MEDICARE

## 2025-08-21 ENCOUNTER — TELEPHONE (OUTPATIENT)
Dept: DIABETES | Facility: CLINIC | Age: 70
End: 2025-08-21

## 2025-08-21 VITALS
WEIGHT: 140.63 LBS | TEMPERATURE: 100 F | HEIGHT: 60 IN | SYSTOLIC BLOOD PRESSURE: 146 MMHG | HEART RATE: 82 BPM | DIASTOLIC BLOOD PRESSURE: 79 MMHG | BODY MASS INDEX: 27.61 KG/M2

## 2025-08-21 VITALS
HEART RATE: 70 BPM | BODY MASS INDEX: 26.68 KG/M2 | DIASTOLIC BLOOD PRESSURE: 72 MMHG | SYSTOLIC BLOOD PRESSURE: 128 MMHG | WEIGHT: 141.31 LBS | HEIGHT: 61 IN

## 2025-08-21 DIAGNOSIS — Z79.4 TYPE 2 DIABETES MELLITUS WITHOUT COMPLICATION, WITH LONG-TERM CURRENT USE OF INSULIN: Primary | ICD-10-CM

## 2025-08-21 DIAGNOSIS — Z79.4 TYPE 2 DIABETES MELLITUS WITHOUT COMPLICATION, WITH LONG-TERM CURRENT USE OF INSULIN: ICD-10-CM

## 2025-08-21 DIAGNOSIS — E11.69 HYPERLIPIDEMIA ASSOCIATED WITH TYPE 2 DIABETES MELLITUS: ICD-10-CM

## 2025-08-21 DIAGNOSIS — E08.42 DIABETIC POLYNEUROPATHY ASSOCIATED WITH DIABETES MELLITUS DUE TO UNDERLYING CONDITION: ICD-10-CM

## 2025-08-21 DIAGNOSIS — E11.9 TYPE 2 DIABETES MELLITUS WITHOUT COMPLICATION, WITH LONG-TERM CURRENT USE OF INSULIN: ICD-10-CM

## 2025-08-21 DIAGNOSIS — L50.1 CHRONIC IDIOPATHIC URTICARIA: ICD-10-CM

## 2025-08-21 DIAGNOSIS — E11.9 HYPERTENSION COMPLICATING DIABETES: ICD-10-CM

## 2025-08-21 DIAGNOSIS — E78.01 FAMILIAL HYPERCHOLESTEREMIA: ICD-10-CM

## 2025-08-21 DIAGNOSIS — I10 HYPERTENSION COMPLICATING DIABETES: ICD-10-CM

## 2025-08-21 DIAGNOSIS — E78.5 HYPERLIPIDEMIA ASSOCIATED WITH TYPE 2 DIABETES MELLITUS: ICD-10-CM

## 2025-08-21 DIAGNOSIS — E11.9 TYPE 2 DIABETES MELLITUS WITHOUT COMPLICATION, WITH LONG-TERM CURRENT USE OF INSULIN: Primary | ICD-10-CM

## 2025-08-21 DIAGNOSIS — J31.0 CHRONIC RHINITIS: Primary | ICD-10-CM

## 2025-08-21 DIAGNOSIS — J31.0 CHRONIC RHINITIS: ICD-10-CM

## 2025-08-21 LAB — GLUCOSE SERPL-MCNC: 159 MG/DL (ref 70–110)

## 2025-08-21 PROCEDURE — 99215 OFFICE O/P EST HI 40 MIN: CPT | Mod: PBBFAC,27 | Performed by: STUDENT IN AN ORGANIZED HEALTH CARE EDUCATION/TRAINING PROGRAM

## 2025-08-21 PROCEDURE — 82962 GLUCOSE BLOOD TEST: CPT | Mod: PBBFAC | Performed by: NURSE PRACTITIONER

## 2025-08-21 PROCEDURE — G2211 COMPLEX E/M VISIT ADD ON: HCPCS | Mod: ,,, | Performed by: STUDENT IN AN ORGANIZED HEALTH CARE EDUCATION/TRAINING PROGRAM

## 2025-08-21 PROCEDURE — 99999 PR PBB SHADOW E&M-EST. PATIENT-LVL V: CPT | Mod: PBBFAC,,, | Performed by: STUDENT IN AN ORGANIZED HEALTH CARE EDUCATION/TRAINING PROGRAM

## 2025-08-21 PROCEDURE — 99999 PR PBB SHADOW E&M-EST. PATIENT-LVL V: CPT | Mod: PBBFAC,,, | Performed by: NURSE PRACTITIONER

## 2025-08-21 PROCEDURE — 99215 OFFICE O/P EST HI 40 MIN: CPT | Mod: PBBFAC | Performed by: NURSE PRACTITIONER

## 2025-08-21 PROCEDURE — 99214 OFFICE O/P EST MOD 30 MIN: CPT | Mod: S$PBB,,, | Performed by: STUDENT IN AN ORGANIZED HEALTH CARE EDUCATION/TRAINING PROGRAM

## 2025-08-21 PROCEDURE — 82785 ASSAY OF IGE: CPT

## 2025-08-21 PROCEDURE — 36415 COLL VENOUS BLD VENIPUNCTURE: CPT

## 2025-08-21 PROCEDURE — 99999PBSHW POCT GLUCOSE, HAND-HELD DEVICE: Mod: PBBFAC,,,

## 2025-08-21 RX ORDER — IPRATROPIUM BROMIDE 42 UG/1
2 SPRAY, METERED NASAL 4 TIMES DAILY PRN
Qty: 15 ML | Refills: 11 | Status: SHIPPED | OUTPATIENT
Start: 2025-08-21 | End: 2026-08-21

## 2025-08-21 RX ORDER — INSULIN GLARGINE 100 [IU]/ML
INJECTION, SOLUTION SUBCUTANEOUS
Qty: 15 ML | Refills: 5 | Status: SHIPPED | OUTPATIENT
Start: 2025-08-21

## 2025-08-21 RX ORDER — INSULIN GLARGINE 100 [IU]/ML
INJECTION, SOLUTION SUBCUTANEOUS
Qty: 15 ML | Refills: 5 | OUTPATIENT
Start: 2025-08-21

## 2025-08-21 RX ORDER — PEN NEEDLE, DIABETIC 30 GX3/16"
NEEDLE, DISPOSABLE MISCELLANEOUS
Qty: 100 EACH | Refills: 5 | Status: SHIPPED | OUTPATIENT
Start: 2025-08-21

## 2025-08-26 LAB
W ALLERGY INTERPRETATION: ABNORMAL
W ALTERNARIA ALTERNATA, CLASS: ABNORMAL
W ALTERNARIA ALTERNATA, IGE: <0.1 KU/L
W ASPERGILLUS FUMIGATUS, CLASS: ABNORMAL
W ASPERGILLUS FUMIGATUS, IGE: <0.1 KU/L
W BERMUDA GRASS, CLASS: ABNORMAL
W BERMUDA GRASS, IGE: <0.1 KU/L
W CAT DANDER, CLASS: ABNORMAL
W CAT DANDER, IGE: <0.1 KU/L
W CLADOSPORIUM HERBARUM, CLASS: ABNORMAL
W CLADOSPORIUM HERBARUM, IGE: <0.1 KU/L
W COCKROACH, GERMAN, CLASS: ABNORMAL
W COCKROACH, GERMAN, IGE: <0.1 KU/L
W COMMON PIGWEED, CLASS: ABNORMAL
W COMMON PIGWEED, IGE: <0.1 KU/L
W COMMON RAGWEED (SHORT), CLASS: ABNORMAL
W COMMON RAGWEED (SHORT), IGE: <0.1 KU/L
W COMMON SILVER BIRCH, CLASS: ABNORMAL
W COMMON SILVER BIRCH, IGE: <0.1 KU/L
W DERMATOPHAGOIDES FARINAE CLASS: ABNORMAL
W DERMATOPHAGOIDES FARINAE, IGE: 4.4 KU/L
W DERMATOPHAGOIDES PTERONYSSINUS CLASS: ABNORMAL
W DERMATOPHAGOIDES PTERONYSSINUS, IGE: 0.53 KU/L
W DOG DANDER, CLASS: ABNORMAL
W DOG DANDER, IGE: <0.1 KU/L
W ELM, CLASS: ABNORMAL
W ELM, IGE: <0.1 KU/L
W IGE: 28 IU/ML
W MAPLE (BOX ELDER), CLASS: ABNORMAL
W MAPLE (BOX ELDER), IGE: <0.1 KU/L
W MOUNTAIN JUNIPER CLASS: ABNORMAL
W MOUNTAIN JUNIPER, IGE: <0.1 KU/L
W MOUSE URINE PROTEINS CLASS: ABNORMAL
W MOUSE URINE PROTEINS, IGE: <0.1 KU/L
W MULBERRY, CLASS: ABNORMAL
W MULBERRY, IGE: <0.1 KU/L
W OAK, CLASS: ABNORMAL
W OAK, IGE: <0.1 KU/L
W PECAN, HICKORY, CLASS: ABNORMAL
W PECAN, HICKORY, IGE: <0.1 KU/L
W PENICILLIUM CHRYSOGENUM, CLASS: ABNORMAL
W PENICILLIUM CHRYSOGENUM, IGE: <0.1 KU/L
W ROUGH MARSHELDER, CLASS: ABNORMAL
W ROUGH MARSHELDER, IGE: <0.1 KU/L
W TIMOTHY GRASS, CLASS: ABNORMAL
W TIMOTHY GRASS, IGE: <0.1 KU/L
W WALNUT TREE, CLASS: ABNORMAL
W WALNUT TREE, IGE: <0.1 KU/L

## 2025-08-28 ENCOUNTER — TELEPHONE (OUTPATIENT)
Dept: DIABETES | Facility: CLINIC | Age: 70
End: 2025-08-28
Payer: MEDICARE

## 2025-09-01 ENCOUNTER — OCHSNER VIRTUAL EMERGENCY DEPARTMENT (OUTPATIENT)
Facility: CLINIC | Age: 70
End: 2025-09-01
Payer: MEDICARE

## 2025-09-01 ENCOUNTER — NURSE TRIAGE (OUTPATIENT)
Dept: ADMINISTRATIVE | Facility: CLINIC | Age: 70
End: 2025-09-01
Payer: MEDICARE

## 2025-09-01 ENCOUNTER — PATIENT OUTREACH (OUTPATIENT)
Facility: OTHER | Age: 70
End: 2025-09-01
Payer: MEDICARE

## 2025-09-01 DIAGNOSIS — L29.9 ITCHING: Primary | ICD-10-CM

## 2025-09-02 ENCOUNTER — PATIENT OUTREACH (OUTPATIENT)
Facility: OTHER | Age: 70
End: 2025-09-02
Payer: MEDICARE

## 2025-09-04 ENCOUNTER — PATIENT OUTREACH (OUTPATIENT)
Facility: OTHER | Age: 70
End: 2025-09-04
Payer: MEDICARE

## 2025-09-04 ENCOUNTER — OFFICE VISIT (OUTPATIENT)
Dept: FAMILY MEDICINE | Facility: CLINIC | Age: 70
End: 2025-09-04
Attending: FAMILY MEDICINE
Payer: MEDICARE

## 2025-09-04 ENCOUNTER — CLINICAL SUPPORT (OUTPATIENT)
Dept: DIABETES | Facility: CLINIC | Age: 70
End: 2025-09-04
Payer: MEDICARE

## 2025-09-04 VITALS
SYSTOLIC BLOOD PRESSURE: 130 MMHG | HEIGHT: 60 IN | BODY MASS INDEX: 27.72 KG/M2 | TEMPERATURE: 98 F | HEART RATE: 99 BPM | WEIGHT: 141.19 LBS | OXYGEN SATURATION: 98 % | DIASTOLIC BLOOD PRESSURE: 80 MMHG

## 2025-09-04 DIAGNOSIS — Z79.4 TYPE 2 DIABETES MELLITUS WITHOUT COMPLICATION, WITH LONG-TERM CURRENT USE OF INSULIN: ICD-10-CM

## 2025-09-04 DIAGNOSIS — E11.9 TYPE 2 DIABETES MELLITUS WITHOUT COMPLICATION, WITH LONG-TERM CURRENT USE OF INSULIN: ICD-10-CM

## 2025-09-04 DIAGNOSIS — E11.9 TYPE 2 DIABETES MELLITUS WITHOUT COMPLICATION, WITH LONG-TERM CURRENT USE OF INSULIN: Primary | ICD-10-CM

## 2025-09-04 DIAGNOSIS — B02.9 HERPES ZOSTER WITHOUT COMPLICATION: Primary | ICD-10-CM

## 2025-09-04 DIAGNOSIS — Z79.4 TYPE 2 DIABETES MELLITUS WITHOUT COMPLICATION, WITH LONG-TERM CURRENT USE OF INSULIN: Primary | ICD-10-CM

## 2025-09-04 PROCEDURE — 99999 PR PBB SHADOW E&M-EST. PATIENT-LVL V: CPT | Mod: PBBFAC,,, | Performed by: FAMILY MEDICINE

## 2025-09-04 PROCEDURE — 99215 OFFICE O/P EST HI 40 MIN: CPT | Mod: PBBFAC,PO | Performed by: FAMILY MEDICINE

## 2025-09-04 RX ORDER — VALACYCLOVIR HYDROCHLORIDE 1 G/1
TABLET, FILM COATED ORAL
Qty: 21 TABLET | Refills: 0 | Status: SHIPPED | OUTPATIENT
Start: 2025-09-04

## 2025-09-04 RX ORDER — INSULIN GLARGINE 100 [IU]/ML
INJECTION, SOLUTION SUBCUTANEOUS
Qty: 15 ML | Refills: 5 | Status: SHIPPED | OUTPATIENT
Start: 2025-09-04